# Patient Record
Sex: FEMALE | Race: WHITE | Employment: UNEMPLOYED | URBAN - NONMETROPOLITAN AREA
[De-identification: names, ages, dates, MRNs, and addresses within clinical notes are randomized per-mention and may not be internally consistent; named-entity substitution may affect disease eponyms.]

---
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
## 2024-11-20 ENCOUNTER — HOSPITAL ENCOUNTER (INPATIENT)
Age: 59
LOS: 16 days | Discharge: NURSING, REHAB OR LONG-TERM CARE | End: 2024-12-06
Attending: STUDENT IN AN ORGANIZED HEALTH CARE EDUCATION/TRAINING PROGRAM | Admitting: INTERNAL MEDICINE
Payer: COMMERCIAL

## 2024-11-20 ENCOUNTER — APPOINTMENT (OUTPATIENT)
Dept: GENERAL RADIOLOGY | Age: 59
End: 2024-11-20
Attending: STUDENT IN AN ORGANIZED HEALTH CARE EDUCATION/TRAINING PROGRAM
Payer: COMMERCIAL

## 2024-11-20 ENCOUNTER — APPOINTMENT (OUTPATIENT)
Dept: CT IMAGING | Age: 59
End: 2024-11-20
Attending: STUDENT IN AN ORGANIZED HEALTH CARE EDUCATION/TRAINING PROGRAM
Payer: COMMERCIAL

## 2024-11-20 DIAGNOSIS — J96.22 ACUTE ON CHRONIC RESPIRATORY FAILURE WITH HYPOXIA AND HYPERCAPNIA: ICD-10-CM

## 2024-11-20 DIAGNOSIS — R79.89 ELEVATED TROPONIN LEVEL: Primary | ICD-10-CM

## 2024-11-20 DIAGNOSIS — J96.21 ACUTE ON CHRONIC RESPIRATORY FAILURE WITH HYPOXIA AND HYPERCAPNIA: ICD-10-CM

## 2024-11-20 DIAGNOSIS — I48.91 NEW ONSET ATRIAL FIBRILLATION (HCC): ICD-10-CM

## 2024-11-20 DIAGNOSIS — I50.9 CONGESTIVE HEART FAILURE, UNSPECIFIED HF CHRONICITY, UNSPECIFIED HEART FAILURE TYPE (HCC): ICD-10-CM

## 2024-11-20 PROBLEM — N12 PYELONEPHRITIS: Status: ACTIVE | Noted: 2024-11-20

## 2024-11-20 PROBLEM — A41.9 SEPSIS (HCC): Status: ACTIVE | Noted: 2024-11-20

## 2024-11-20 LAB
ALBUMIN SERPL BCG-MCNC: 2.7 G/DL (ref 3.5–5.1)
ALBUMIN SERPL BCG-MCNC: 2.8 G/DL (ref 3.5–5.1)
ALP SERPL-CCNC: 150 U/L (ref 38–126)
ALP SERPL-CCNC: 99 U/L (ref 38–126)
ALT SERPL W/O P-5'-P-CCNC: 21 U/L (ref 11–66)
ALT SERPL W/O P-5'-P-CCNC: 23 U/L (ref 11–66)
ANION GAP SERPL CALC-SCNC: 13 MEQ/L (ref 8–16)
ANION GAP SERPL CALC-SCNC: 15 MEQ/L (ref 8–16)
ARTERIAL PATENCY WRIST A: POSITIVE
AST SERPL-CCNC: 28 U/L (ref 5–40)
AST SERPL-CCNC: 29 U/L (ref 5–40)
BASE EXCESS BLDA CALC-SCNC: -8 MMOL/L (ref -2.5–2.5)
BASOPHILS ABSOLUTE: 0 THOU/MM3 (ref 0–0.1)
BASOPHILS NFR BLD AUTO: 0.2 %
BDY SITE: ABNORMAL
BILIRUB CONJ SERPL-MCNC: 0.3 MG/DL (ref 0.1–13.8)
BILIRUB SERPL-MCNC: 0.4 MG/DL (ref 0.3–1.2)
BILIRUB SERPL-MCNC: 0.5 MG/DL (ref 0.3–1.2)
BUN SERPL-MCNC: 38 MG/DL (ref 7–22)
BUN SERPL-MCNC: 38 MG/DL (ref 7–22)
CA-I BLD ISE-SCNC: 1.07 MMOL/L (ref 1.12–1.32)
CALCIUM SERPL-MCNC: 7.2 MG/DL (ref 8.5–10.5)
CALCIUM SERPL-MCNC: 7.6 MG/DL (ref 8.5–10.5)
CHLORIDE 24H UR-SRATE: 32 MEQ/L
CHLORIDE SERPL-SCNC: 102 MEQ/L (ref 98–111)
CHLORIDE SERPL-SCNC: 99 MEQ/L (ref 98–111)
CO2 SERPL-SCNC: 19 MEQ/L (ref 23–33)
CO2 SERPL-SCNC: 21 MEQ/L (ref 23–33)
COLLECTED BY:: ABNORMAL
CREAT SERPL-MCNC: 2.6 MG/DL (ref 0.4–1.2)
CREAT SERPL-MCNC: 2.9 MG/DL (ref 0.4–1.2)
CREAT UR-MCNC: 135.2 MG/DL
DEPRECATED RDW RBC AUTO: 43.7 FL (ref 35–45)
DEPRECATED RDW RBC AUTO: 44.9 FL (ref 35–45)
DEVICE: ABNORMAL
EOSINOPHIL NFR BLD AUTO: 0.1 %
EOSINOPHILS ABSOLUTE: 0 THOU/MM3 (ref 0–0.4)
ERYTHROCYTE [DISTWIDTH] IN BLOOD BY AUTOMATED COUNT: 15 % (ref 11.5–14.5)
ERYTHROCYTE [DISTWIDTH] IN BLOOD BY AUTOMATED COUNT: 15.1 % (ref 11.5–14.5)
FIO2 ON VENT O2 ANALYZER: 100 %
GFR SERPL CREATININE-BSD FRML MDRD: 18 ML/MIN/1.73M2
GFR SERPL CREATININE-BSD FRML MDRD: 20 ML/MIN/1.73M2
GLUCOSE BLD STRIP.AUTO-MCNC: 188 MG/DL (ref 70–108)
GLUCOSE BLD STRIP.AUTO-MCNC: 228 MG/DL (ref 70–108)
GLUCOSE SERPL-MCNC: 124 MG/DL (ref 70–108)
GLUCOSE SERPL-MCNC: 199 MG/DL (ref 70–108)
HCO3 BLDA-SCNC: 22 MMOL/L (ref 23–28)
HCT VFR BLD AUTO: 32.8 % (ref 37–47)
HCT VFR BLD AUTO: 32.9 % (ref 37–47)
HGB BLD-MCNC: 10 GM/DL (ref 12–16)
HGB BLD-MCNC: 10.3 GM/DL (ref 12–16)
IMM GRANULOCYTES # BLD AUTO: 1.37 THOU/MM3 (ref 0–0.07)
IMM GRANULOCYTES NFR BLD AUTO: 8.3 %
LACTIC ACID, SEPSIS: 2.3 MMOL/L (ref 0.5–1.9)
LACTIC ACID, SEPSIS: 6.1 MMOL/L (ref 0.5–1.9)
LYMPHOCYTES ABSOLUTE: 0.4 THOU/MM3 (ref 1–4.8)
LYMPHOCYTES NFR BLD AUTO: 2.2 %
MAGNESIUM SERPL-MCNC: 1.1 MG/DL (ref 1.6–2.4)
MCH RBC QN AUTO: 24.9 PG (ref 26–33)
MCH RBC QN AUTO: 25 PG (ref 26–33)
MCHC RBC AUTO-ENTMCNC: 30.4 GM/DL (ref 32.2–35.5)
MCHC RBC AUTO-ENTMCNC: 31.4 GM/DL (ref 32.2–35.5)
MCV RBC AUTO: 79.6 FL (ref 81–99)
MCV RBC AUTO: 82 FL (ref 81–99)
MONOCYTES ABSOLUTE: 0.6 THOU/MM3 (ref 0.4–1.3)
MONOCYTES NFR BLD AUTO: 3.7 %
NEUTROPHILS ABSOLUTE: 14 THOU/MM3 (ref 1.8–7.7)
NEUTROPHILS NFR BLD AUTO: 85.5 %
NRBC BLD AUTO-RTO: 0 /100 WBC
PCO2 BLDA: 67 MMHG (ref 35–45)
PH BLDA: 7.13 [PH] (ref 7.35–7.45)
PLATELET # BLD AUTO: 184 THOU/MM3 (ref 130–400)
PLATELET # BLD AUTO: 194 THOU/MM3 (ref 130–400)
PMV BLD AUTO: 10.3 FL (ref 9.4–12.4)
PMV BLD AUTO: 10.7 FL (ref 9.4–12.4)
PO2 BLDA: 164 MMHG (ref 71–104)
POTASSIUM SERPL-SCNC: 4 MEQ/L (ref 3.5–5.2)
POTASSIUM SERPL-SCNC: 4.1 MEQ/L (ref 3.5–5.2)
POTASSIUM UR-SCNC: 43.9 MEQ/L
PROCALCITONIN SERPL IA-MCNC: 15.61 NG/ML (ref 0.01–0.09)
PROT SERPL-MCNC: 5.8 G/DL (ref 6.1–8)
PROT SERPL-MCNC: 6 G/DL (ref 6.1–8)
RBC # BLD AUTO: 4.01 MILL/MM3 (ref 4.2–5.4)
RBC # BLD AUTO: 4.12 MILL/MM3 (ref 4.2–5.4)
SAO2 % BLDA: 99 %
SCAN OF BLOOD SMEAR: NORMAL
SODIUM SERPL-SCNC: 133 MEQ/L (ref 135–145)
SODIUM SERPL-SCNC: 136 MEQ/L (ref 135–145)
SODIUM UR-SCNC: 42 MEQ/L
TROPONIN, HIGH SENSITIVITY: 50 NG/L (ref 0–12)
TROPONIN, HIGH SENSITIVITY: 52 NG/L (ref 0–12)
TSH SERPL DL<=0.005 MIU/L-ACNC: 1.96 UIU/ML (ref 0.4–4.2)
VENTILATION MODE VENT: ABNORMAL
WBC # BLD AUTO: 10.2 THOU/MM3 (ref 4.8–10.8)
WBC # BLD AUTO: 16.4 THOU/MM3 (ref 4.8–10.8)

## 2024-11-20 PROCEDURE — 89190 NASAL SMEAR FOR EOSINOPHILS: CPT

## 2024-11-20 PROCEDURE — 93005 ELECTROCARDIOGRAM TRACING: CPT | Performed by: UROLOGY

## 2024-11-20 PROCEDURE — 5A09557 ASSISTANCE WITH RESPIRATORY VENTILATION, GREATER THAN 96 CONSECUTIVE HOURS, CONTINUOUS POSITIVE AIRWAY PRESSURE: ICD-10-PCS | Performed by: INTERNAL MEDICINE

## 2024-11-20 PROCEDURE — P9047 ALBUMIN (HUMAN), 25%, 50ML: HCPCS

## 2024-11-20 PROCEDURE — 87581 M.PNEUMON DNA AMP PROBE: CPT

## 2024-11-20 PROCEDURE — 87541 LEGION PNEUMO DNA AMP PROB: CPT

## 2024-11-20 PROCEDURE — 87631 RESP VIRUS 3-5 TARGETS: CPT

## 2024-11-20 PROCEDURE — 82330 ASSAY OF CALCIUM: CPT

## 2024-11-20 PROCEDURE — 6360000002 HC RX W HCPCS

## 2024-11-20 PROCEDURE — 6360000002 HC RX W HCPCS: Performed by: PHYSICIAN ASSISTANT

## 2024-11-20 PROCEDURE — 87205 SMEAR GRAM STAIN: CPT

## 2024-11-20 PROCEDURE — 84133 ASSAY OF URINE POTASSIUM: CPT

## 2024-11-20 PROCEDURE — 2580000003 HC RX 258

## 2024-11-20 PROCEDURE — 93005 ELECTROCARDIOGRAM TRACING: CPT | Performed by: PHYSICIAN ASSISTANT

## 2024-11-20 PROCEDURE — 83735 ASSAY OF MAGNESIUM: CPT

## 2024-11-20 PROCEDURE — 87486 CHLMYD PNEUM DNA AMP PROBE: CPT

## 2024-11-20 PROCEDURE — 82248 BILIRUBIN DIRECT: CPT

## 2024-11-20 PROCEDURE — 82948 REAGENT STRIP/BLOOD GLUCOSE: CPT

## 2024-11-20 PROCEDURE — 99223 1ST HOSP IP/OBS HIGH 75: CPT | Performed by: PHYSICIAN ASSISTANT

## 2024-11-20 PROCEDURE — 2000000000 HC ICU R&B

## 2024-11-20 PROCEDURE — 94002 VENT MGMT INPAT INIT DAY: CPT

## 2024-11-20 PROCEDURE — 87077 CULTURE AEROBIC IDENTIFY: CPT

## 2024-11-20 PROCEDURE — 99291 CRITICAL CARE FIRST HOUR: CPT

## 2024-11-20 PROCEDURE — 84443 ASSAY THYROID STIM HORMONE: CPT

## 2024-11-20 PROCEDURE — 81001 URINALYSIS AUTO W/SCOPE: CPT

## 2024-11-20 PROCEDURE — 82803 BLOOD GASES ANY COMBINATION: CPT

## 2024-11-20 PROCEDURE — 82570 ASSAY OF URINE CREATININE: CPT

## 2024-11-20 PROCEDURE — 87798 DETECT AGENT NOS DNA AMP: CPT

## 2024-11-20 PROCEDURE — 87040 BLOOD CULTURE FOR BACTERIA: CPT

## 2024-11-20 PROCEDURE — 85025 COMPLETE CBC W/AUTO DIFF WBC: CPT

## 2024-11-20 PROCEDURE — 87186 SC STD MICRODIL/AGAR DIL: CPT

## 2024-11-20 PROCEDURE — 71045 X-RAY EXAM CHEST 1 VIEW: CPT

## 2024-11-20 PROCEDURE — 87070 CULTURE OTHR SPECIMN AEROBIC: CPT

## 2024-11-20 PROCEDURE — 31500 INSERT EMERGENCY AIRWAY: CPT

## 2024-11-20 PROCEDURE — 5A1955Z RESPIRATORY VENTILATION, GREATER THAN 96 CONSECUTIVE HOURS: ICD-10-PCS | Performed by: INTERNAL MEDICINE

## 2024-11-20 PROCEDURE — 36415 COLL VENOUS BLD VENIPUNCTURE: CPT

## 2024-11-20 PROCEDURE — 89220 SPUTUM SPECIMEN COLLECTION: CPT

## 2024-11-20 PROCEDURE — 93005 ELECTROCARDIOGRAM TRACING: CPT

## 2024-11-20 PROCEDURE — 83605 ASSAY OF LACTIC ACID: CPT

## 2024-11-20 PROCEDURE — 85027 COMPLETE CBC AUTOMATED: CPT

## 2024-11-20 PROCEDURE — 51702 INSERT TEMP BLADDER CATH: CPT

## 2024-11-20 PROCEDURE — 84300 ASSAY OF URINE SODIUM: CPT

## 2024-11-20 PROCEDURE — 84484 ASSAY OF TROPONIN QUANT: CPT

## 2024-11-20 PROCEDURE — 84145 PROCALCITONIN (PCT): CPT

## 2024-11-20 PROCEDURE — 80053 COMPREHEN METABOLIC PANEL: CPT

## 2024-11-20 PROCEDURE — 6370000000 HC RX 637 (ALT 250 FOR IP): Performed by: PHYSICIAN ASSISTANT

## 2024-11-20 PROCEDURE — 36600 WITHDRAWAL OF ARTERIAL BLOOD: CPT

## 2024-11-20 PROCEDURE — 82436 ASSAY OF URINE CHLORIDE: CPT

## 2024-11-20 PROCEDURE — 2580000003 HC RX 258: Performed by: PHYSICIAN ASSISTANT

## 2024-11-20 PROCEDURE — 05HY33Z INSERTION OF INFUSION DEVICE INTO UPPER VEIN, PERCUTANEOUS APPROACH: ICD-10-PCS | Performed by: INTERNAL MEDICINE

## 2024-11-20 PROCEDURE — 87086 URINE CULTURE/COLONY COUNT: CPT

## 2024-11-20 RX ORDER — PROPOFOL 10 MG/ML
INJECTION, EMULSION INTRAVENOUS
Status: COMPLETED
Start: 2024-11-20 | End: 2024-11-20

## 2024-11-20 RX ORDER — SODIUM CHLORIDE 0.9 % (FLUSH) 0.9 %
5-40 SYRINGE (ML) INJECTION EVERY 12 HOURS SCHEDULED
Status: DISCONTINUED | OUTPATIENT
Start: 2024-11-20 | End: 2024-12-06 | Stop reason: HOSPADM

## 2024-11-20 RX ORDER — ONDANSETRON 2 MG/ML
4 INJECTION INTRAMUSCULAR; INTRAVENOUS EVERY 6 HOURS PRN
Status: DISCONTINUED | OUTPATIENT
Start: 2024-11-20 | End: 2024-12-06 | Stop reason: HOSPADM

## 2024-11-20 RX ORDER — CALCIUM GLUCONATE 20 MG/ML
2000 INJECTION, SOLUTION INTRAVENOUS PRN
Status: DISCONTINUED | OUTPATIENT
Start: 2024-11-20 | End: 2024-11-20

## 2024-11-20 RX ORDER — ALBUTEROL SULFATE 0.83 MG/ML
2.5 SOLUTION RESPIRATORY (INHALATION) EVERY 6 HOURS PRN
Status: DISCONTINUED | OUTPATIENT
Start: 2024-11-20 | End: 2024-11-20

## 2024-11-20 RX ORDER — IPRATROPIUM BROMIDE AND ALBUTEROL SULFATE 2.5; .5 MG/3ML; MG/3ML
1 SOLUTION RESPIRATORY (INHALATION) ONCE
Status: COMPLETED | OUTPATIENT
Start: 2024-11-20 | End: 2024-11-21

## 2024-11-20 RX ORDER — MAGNESIUM SULFATE IN WATER 40 MG/ML
4000 INJECTION, SOLUTION INTRAVENOUS ONCE
Status: COMPLETED | OUTPATIENT
Start: 2024-11-20 | End: 2024-11-21

## 2024-11-20 RX ORDER — NOREPINEPHRINE BITARTRATE 0.06 MG/ML
1-100 INJECTION, SOLUTION INTRAVENOUS CONTINUOUS
Status: DISCONTINUED | OUTPATIENT
Start: 2024-11-20 | End: 2024-11-21

## 2024-11-20 RX ORDER — ACETAMINOPHEN 650 MG/1
650 SUPPOSITORY RECTAL EVERY 6 HOURS PRN
Status: DISCONTINUED | OUTPATIENT
Start: 2024-11-20 | End: 2024-12-06 | Stop reason: SDUPTHER

## 2024-11-20 RX ORDER — CALCIUM GLUCONATE 20 MG/ML
2000 INJECTION, SOLUTION INTRAVENOUS ONCE
Status: COMPLETED | OUTPATIENT
Start: 2024-11-20 | End: 2024-11-21

## 2024-11-20 RX ORDER — POTASSIUM CHLORIDE 7.45 MG/ML
10 INJECTION INTRAVENOUS PRN
Status: DISCONTINUED | OUTPATIENT
Start: 2024-11-20 | End: 2024-11-20

## 2024-11-20 RX ORDER — ALBUMIN (HUMAN) 12.5 G/50ML
25 SOLUTION INTRAVENOUS ONCE
Status: COMPLETED | OUTPATIENT
Start: 2024-11-20 | End: 2024-11-20

## 2024-11-20 RX ORDER — ATORVASTATIN CALCIUM 20 MG/1
20 TABLET, FILM COATED ORAL NIGHTLY
Status: DISCONTINUED | OUTPATIENT
Start: 2024-11-20 | End: 2024-11-27

## 2024-11-20 RX ORDER — POLYETHYLENE GLYCOL 3350 17 G/17G
17 POWDER, FOR SOLUTION ORAL DAILY PRN
Status: DISCONTINUED | OUTPATIENT
Start: 2024-11-20 | End: 2024-12-06 | Stop reason: HOSPADM

## 2024-11-20 RX ORDER — SODIUM CHLORIDE 9 MG/ML
INJECTION, SOLUTION INTRAVENOUS PRN
Status: DISCONTINUED | OUTPATIENT
Start: 2024-11-20 | End: 2024-12-06 | Stop reason: HOSPADM

## 2024-11-20 RX ORDER — POTASSIUM CHLORIDE 1500 MG/1
40 TABLET, EXTENDED RELEASE ORAL PRN
Status: DISCONTINUED | OUTPATIENT
Start: 2024-11-20 | End: 2024-11-20

## 2024-11-20 RX ORDER — ONDANSETRON 4 MG/1
4 TABLET, ORALLY DISINTEGRATING ORAL EVERY 8 HOURS PRN
Status: DISCONTINUED | OUTPATIENT
Start: 2024-11-20 | End: 2024-12-06 | Stop reason: HOSPADM

## 2024-11-20 RX ORDER — PROPOFOL 10 MG/ML
5-50 INJECTION, EMULSION INTRAVENOUS CONTINUOUS
Status: DISCONTINUED | OUTPATIENT
Start: 2024-11-20 | End: 2024-11-21

## 2024-11-20 RX ORDER — GLUCAGON 1 MG/ML
1 KIT INJECTION PRN
Status: DISCONTINUED | OUTPATIENT
Start: 2024-11-20 | End: 2024-12-06 | Stop reason: HOSPADM

## 2024-11-20 RX ORDER — FENTANYL CITRATE 50 UG/ML
50 INJECTION, SOLUTION INTRAMUSCULAR; INTRAVENOUS
Status: DISCONTINUED | OUTPATIENT
Start: 2024-11-20 | End: 2024-11-21

## 2024-11-20 RX ORDER — SODIUM CHLORIDE 9 MG/ML
INJECTION, SOLUTION INTRAVENOUS CONTINUOUS
Status: DISCONTINUED | OUTPATIENT
Start: 2024-11-20 | End: 2024-11-22

## 2024-11-20 RX ORDER — METOPROLOL SUCCINATE 50 MG/1
50 TABLET, EXTENDED RELEASE ORAL DAILY
Status: DISCONTINUED | OUTPATIENT
Start: 2024-11-20 | End: 2024-11-24

## 2024-11-20 RX ORDER — INSULIN LISPRO 100 [IU]/ML
0-4 INJECTION, SOLUTION INTRAVENOUS; SUBCUTANEOUS
Status: DISCONTINUED | OUTPATIENT
Start: 2024-11-20 | End: 2024-11-23

## 2024-11-20 RX ORDER — SODIUM CHLORIDE 0.9 % (FLUSH) 0.9 %
5-40 SYRINGE (ML) INJECTION PRN
Status: DISCONTINUED | OUTPATIENT
Start: 2024-11-20 | End: 2024-12-06 | Stop reason: HOSPADM

## 2024-11-20 RX ORDER — LISINOPRIL 40 MG/1
40 TABLET ORAL DAILY
Status: DISCONTINUED | OUTPATIENT
Start: 2024-11-20 | End: 2024-12-06 | Stop reason: HOSPADM

## 2024-11-20 RX ORDER — MAGNESIUM SULFATE IN WATER 40 MG/ML
2000 INJECTION, SOLUTION INTRAVENOUS PRN
Status: DISCONTINUED | OUTPATIENT
Start: 2024-11-20 | End: 2024-11-20

## 2024-11-20 RX ORDER — ALBUTEROL SULFATE 0.83 MG/ML
2.5 SOLUTION RESPIRATORY (INHALATION) EVERY 6 HOURS PRN
Status: DISCONTINUED | OUTPATIENT
Start: 2024-11-20 | End: 2024-11-28

## 2024-11-20 RX ORDER — DEXTROSE MONOHYDRATE 100 MG/ML
INJECTION, SOLUTION INTRAVENOUS CONTINUOUS PRN
Status: DISCONTINUED | OUTPATIENT
Start: 2024-11-20 | End: 2024-12-06 | Stop reason: HOSPADM

## 2024-11-20 RX ORDER — ACETAMINOPHEN 325 MG/1
650 TABLET ORAL EVERY 6 HOURS PRN
Status: DISCONTINUED | OUTPATIENT
Start: 2024-11-20 | End: 2024-12-06 | Stop reason: SDUPTHER

## 2024-11-20 RX ORDER — POTASSIUM CHLORIDE 29.8 MG/ML
20 INJECTION INTRAVENOUS PRN
Status: DISCONTINUED | OUTPATIENT
Start: 2024-11-20 | End: 2024-11-20

## 2024-11-20 RX ADMIN — SODIUM CHLORIDE, PRESERVATIVE FREE 10 ML: 5 INJECTION INTRAVENOUS at 22:50

## 2024-11-20 RX ADMIN — ATORVASTATIN CALCIUM 20 MG: 20 TABLET, FILM COATED ORAL at 23:11

## 2024-11-20 RX ADMIN — SODIUM CHLORIDE: 9 INJECTION, SOLUTION INTRAVENOUS at 22:17

## 2024-11-20 RX ADMIN — WATER 2000 MG: 1 INJECTION INTRAMUSCULAR; INTRAVENOUS; SUBCUTANEOUS at 23:06

## 2024-11-20 RX ADMIN — INSULIN LISPRO 1 UNITS: 100 INJECTION, SOLUTION INTRAVENOUS; SUBCUTANEOUS at 22:46

## 2024-11-20 RX ADMIN — FENTANYL CITRATE 50 MCG: 50 INJECTION, SOLUTION INTRAMUSCULAR; INTRAVENOUS at 22:31

## 2024-11-20 RX ADMIN — PROPOFOL 20 MCG/KG/MIN: 10 INJECTION, EMULSION INTRAVENOUS at 21:55

## 2024-11-20 RX ADMIN — ALBUMIN (HUMAN) 25 G: 0.25 INJECTION, SOLUTION INTRAVENOUS at 22:27

## 2024-11-20 RX ADMIN — CALCIUM GLUCONATE 2000 MG: 20 INJECTION, SOLUTION INTRAVENOUS at 22:36

## 2024-11-20 RX ADMIN — MAGNESIUM SULFATE HEPTAHYDRATE 4000 MG: 40 INJECTION, SOLUTION INTRAVENOUS at 22:38

## 2024-11-20 ASSESSMENT — PAIN SCALES - GENERAL: PAINLEVEL_OUTOF10: 0

## 2024-11-20 ASSESSMENT — PULMONARY FUNCTION TESTS: PIF_VALUE: 26

## 2024-11-20 NOTE — H&P
Hospitalist History & Physical    Patient:  Priya Trejo    Unit/Bed:4K-24/024-A  YOB: 1965  MRN: [id number]   PCP: Parmjit Tesfaye MD  Code Status: Full Code    Date of Service: The patient was seen and examined on 11/20/24 and admitted to Inpatient with an expected length of stay of greater than two midnights due to medical therapy.     Chief Complaint: Sepsis secondary to pyelonephritis    Assessment/Plan:    Severe sepsis secondary to pyelonephritis, present on arrival  Identified at outside facility  SIRS Criteria Heart Rate >90 and WBC >12k or <4k or >10% Bands  Organ Dysfunction: Creatinine >2.0 and Lactate >2 mmol/L  Cultures Blood x2 and Urine  Antibiotics Ceftriaxone  If patient remains hemodynamically stable postprocedure, plan to remove central line at that time.  Left nephrolithiasis, left pyelonephritis  Noted on CT scan at the outside facility.  Urology consulted.  Planning for surgical intervention tomorrow.  Will maintain Salazar catheter at this time per urology recommendation.  Acute kidney injury  Suspect likely secondary to hypotension.  May be component of ATN.  Creatinine 2.6, was previously 0.74 on 11/5/2023.  IV fluid resuscitation.  Check UA with microscopy, urine electrolytes.  Daily BMP.  Consider nephrology consultation if creatinine fails to improve.  Hold nephrotoxic agents.  Elevated troponin  Initial troponin 50, repeat pending.  No chest pain or anginal equivalent.  EKG shows sinus tachycardia with ST depressions in lead I.  Suspect demand ischemia in the setting of sepsis.  Troponin also likely elevated secondary to acute kidney injury.  Check TTE  Hypertension  Hold lisinopril due to RAINE.  Continue metoprolol.  Microcytic anemia  Hemoglobin 10.3.  Consider iron studies pending clinical course.  Daily CBC.  Lactic acidosis  Continue IV fluid resuscitation.  Trend lactate.  Hyponatremia  Sodium 133.  On IV fluids.  Repeat BMP

## 2024-11-20 NOTE — PROGRESS NOTES
Report from Facility: [facility] ER  Referring Physician: ADITYA Villarreal  Accepting Physician: Dr. Behl  Patient Condition:  60yo ( [birth year]) at [facility] INP unit. Admitted yesterday with c/o Left lower back pain, shortness of breath. WBC initially 30. down to 20.1. GFR 19. CT abd showed Left pyelonephritis and left renal pelvis stone. Dr. rolon notified and would like to take to surgery sooner than later. PMH. COPD on home O2. Last vitals: Afebrile 114-20-96%3L-1110/75

## 2024-11-20 NOTE — PROCEDURES
PROCEDURE NOTE  Date: 11/20/2024   Name: Priya Lema  YOB: 1965    Procedures  EKG completed

## 2024-11-20 NOTE — PLAN OF CARE
to ensure adequate hydration   Administer IV fluids as ordered to ensure adequate hydration   Administer ordered medications as needed   Encourage mobilization and activity   Nutrition consult to assist patient with appropriate food choices  Goal: Maintains adequate nutritional intake  Outcome: Progressing  Flowsheets (Taken 11/20/2024 1700)  Maintains adequate nutritional intake:   Monitor percentage of each meal consumed   Identify factors contributing to decreased intake, treat as appropriate   Assist with meals as needed   Monitor intake and output, weight and lab values   Obtain nutritional consult as needed     Problem: Genitourinary - Adult  Goal: Absence of urinary retention  Outcome: Progressing  Flowsheets (Taken 11/20/2024 1700)  Absence of urinary retention:   Assess patient’s ability to void and empty bladder   Monitor intake/output and perform bladder scan as needed  Goal: Urinary catheter remains patent  Outcome: Progressing  Flowsheets (Taken 11/20/2024 1700)  Urinary catheter remains patent: Assess patency of urinary catheter     Problem: Infection - Adult  Goal: Absence of infection at discharge  Outcome: Progressing  Flowsheets (Taken 11/20/2024 1700)  Absence of infection at discharge:   Assess and monitor for signs and symptoms of infection   Monitor lab/diagnostic results   Monitor all insertion sites i.e., indwelling lines, tubes and drains   [place] appropriate cooling/warming therapies per order   Administer medications as ordered   Instruct and encourage patient and family to use good hand hygiene technique  Goal: Absence of infection during hospitalization  Outcome: Progressing  Flowsheets (Taken 11/20/2024 1700)  Absence of infection during hospitalization:   Assess and monitor for signs and symptoms of infection   Monitor lab/diagnostic results   Monitor all insertion sites i.e., indwelling lines, tubes and drains   [place] appropriate cooling/warming therapies per order   Administer  Assess for signs and symptoms of bleeding or hemorrhage   Monitor labs for bleeding or clotting disorders     Care plan reviewed with patient and family.  Patient and family verbalize understanding of the plan of care and contribute to goal setting.

## 2024-11-20 NOTE — PROGRESS NOTES
Pt admitted to  4K24 from [facility] ER via EMS.   Complaints: Pyelonephritis/Kidney Stone.    IV normal saline infusing into the internal jugular right, condition patent and no redness at a rate of 75 mls/ hour with about 500 mls in the bag still. IV site free of s/s of infection or infiltration. Vital signs obtained. Assessment and data collection initiated. Two nurse skin assessment performed by Otilia Pereyra RN and Kenisha Manning RN. Oriented to room. Policies and procedures for 4K explained. All questions answered with no further questions at this time. Fall prevention and safety brochure discussed with patient.  Bed alarm on. Call light in reach.

## 2024-11-21 ENCOUNTER — APPOINTMENT (OUTPATIENT)
Dept: ULTRASOUND IMAGING | Age: 59
End: 2024-11-21
Attending: STUDENT IN AN ORGANIZED HEALTH CARE EDUCATION/TRAINING PROGRAM
Payer: COMMERCIAL

## 2024-11-21 ENCOUNTER — APPOINTMENT (OUTPATIENT)
Age: 59
End: 2024-11-21
Attending: PHYSICIAN ASSISTANT
Payer: COMMERCIAL

## 2024-11-21 ENCOUNTER — ANESTHESIA (OUTPATIENT)
Dept: OPERATING ROOM | Age: 59
End: 2024-11-21
Payer: MEDICAID

## 2024-11-21 ENCOUNTER — ANESTHESIA EVENT (OUTPATIENT)
Dept: OPERATING ROOM | Age: 59
End: 2024-11-21
Payer: MEDICAID

## 2024-11-21 PROBLEM — N17.9 AKI (ACUTE KIDNEY INJURY) (HCC): Status: ACTIVE | Noted: 2024-11-21

## 2024-11-21 PROBLEM — E87.20 METABOLIC ACIDOSIS: Status: ACTIVE | Noted: 2024-11-21

## 2024-11-21 PROBLEM — J96.21 ACUTE ON CHRONIC RESPIRATORY FAILURE WITH HYPOXIA AND HYPERCAPNIA: Status: ACTIVE | Noted: 2024-11-21

## 2024-11-21 PROBLEM — E11.65 TYPE 2 DIABETES MELLITUS WITH HYPERGLYCEMIA (HCC): Status: ACTIVE | Noted: 2024-11-21

## 2024-11-21 PROBLEM — J96.22 ACUTE ON CHRONIC RESPIRATORY FAILURE WITH HYPOXIA AND HYPERCAPNIA: Status: ACTIVE | Noted: 2024-11-21

## 2024-11-21 PROBLEM — R79.89 ELEVATED TROPONIN LEVEL: Status: ACTIVE | Noted: 2024-11-21

## 2024-11-21 PROBLEM — J44.1 COPD EXACERBATION (HCC): Status: ACTIVE | Noted: 2024-11-21

## 2024-11-21 PROBLEM — A41.9 SEPTIC SHOCK (HCC): Status: ACTIVE | Noted: 2024-11-21

## 2024-11-21 PROBLEM — R65.21 SEPTIC SHOCK (HCC): Status: ACTIVE | Noted: 2024-11-21

## 2024-11-21 LAB
ACINETOBACTER CALCOACETICUS-BAUMANNII BY PCR: NOT DETECTED
ALBUMIN SERPL BCG-MCNC: 2.8 G/DL (ref 3.5–5.1)
ALP SERPL-CCNC: 84 U/L (ref 38–126)
ALT SERPL W/O P-5'-P-CCNC: 21 U/L (ref 11–66)
ANION GAP SERPL CALC-SCNC: 12 MEQ/L (ref 8–16)
AST SERPL-CCNC: 28 U/L (ref 5–40)
BACTERIA: ABNORMAL
BASE EXCESS BLDA CALC-SCNC: -2.5 MMOL/L (ref -2.5–2.5)
BASOPHILS ABSOLUTE: 0 THOU/MM3 (ref 0–0.1)
BASOPHILS NFR BLD AUTO: 0.1 %
BDY SITE: ABNORMAL
BILIRUB SERPL-MCNC: 0.3 MG/DL (ref 0.3–1.2)
BILIRUB UR QL STRIP: NEGATIVE
BLACTX-M ISLT/SPM QL: NORMAL
BLAIMP ISLT/SPM QL: NORMAL
BLAKPC ISLT/SPM QL: NORMAL
BLAOXA-48-LIKE ISLT/SPM QL: NORMAL
BLAVIM ISLT/SPM QL: NORMAL
BUN SERPL-MCNC: 38 MG/DL (ref 7–22)
C PNEUM DNA LOWER RESP QL NAA+NON-PROBE: NOT DETECTED
CA-I BLD ISE-SCNC: 1.12 MMOL/L (ref 1.12–1.32)
CALCIUM SERPL-MCNC: 7.9 MG/DL (ref 8.5–10.5)
CASTS #/AREA URNS LPF: ABNORMAL /LPF
CASTS #/AREA URNS LPF: ABNORMAL /LPF
CHARACTER UR: ABNORMAL
CHARCOAL URNS QL MICRO: ABNORMAL
CHLORIDE SERPL-SCNC: 103 MEQ/L (ref 98–111)
CO2 SERPL-SCNC: 22 MEQ/L (ref 23–33)
COLLECTED BY:: ABNORMAL
COLOR UR: YELLOW
CREAT SERPL-MCNC: 2.6 MG/DL (ref 0.4–1.2)
CRYSTALS URNS QL MICRO: ABNORMAL
DEPRECATED RDW RBC AUTO: 45 FL (ref 35–45)
DEVICE: ABNORMAL
DOHLE BOD BLD QL SMEAR: PRESENT
ECHO AO ASC DIAM: 3 CM
ECHO AO ASCENDING AORTA INDEX: 1.6 CM/M2
ECHO AO SINUS VALSALVA DIAM: 2.4 CM
ECHO AO SINUS VALSALVA INDEX: 1.28 CM/M2
ECHO AV CUSP MM: 1.7 CM
ECHO AV MEAN GRADIENT: 4 MMHG
ECHO AV MEAN VELOCITY: 0.9 M/S
ECHO AV PEAK GRADIENT: 7 MMHG
ECHO AV PEAK VELOCITY: 1.4 M/S
ECHO AV VELOCITY RATIO: 0.64
ECHO AV VTI: 32.4 CM
ECHO BSA: 1.99 M2
ECHO EST RA PRESSURE: 8 MMHG
ECHO IVC PROX: 2 CM
ECHO LA AREA 2C: 11.2 CM2
ECHO LA AREA 4C: 12.3 CM2
ECHO LA DIAMETER INDEX: 1.49 CM/M2
ECHO LA DIAMETER: 2.8 CM
ECHO LA MAJOR AXIS: 5.2 CM
ECHO LA MINOR AXIS: 4.4 CM
ECHO LA VOL BP: 26 ML (ref 22–52)
ECHO LA VOL MOD A2C: 24 ML (ref 22–52)
ECHO LA VOL MOD A4C: 24 ML (ref 22–52)
ECHO LA VOL/BSA BIPLANE: 14 ML/M2 (ref 16–34)
ECHO LA VOLUME INDEX MOD A2C: 13 ML/M2 (ref 16–34)
ECHO LA VOLUME INDEX MOD A4C: 13 ML/M2 (ref 16–34)
ECHO LV EDV A2C: 125 ML
ECHO LV EDV A4C: 92 ML
ECHO LV EDV INDEX A4C: 49 ML/M2
ECHO LV EDV NDEX A2C: 66 ML/M2
ECHO LV EJECTION FRACTION A2C: 34 %
ECHO LV EJECTION FRACTION A4C: 25 %
ECHO LV EJECTION FRACTION BIPLANE: 29 % (ref 55–100)
ECHO LV ESV A2C: 82 ML
ECHO LV ESV A4C: 69 ML
ECHO LV ESV INDEX A2C: 44 ML/M2
ECHO LV ESV INDEX A4C: 37 ML/M2
ECHO LV FRACTIONAL SHORTENING: 17 % (ref 28–44)
ECHO LV INTERNAL DIMENSION DIASTOLE INDEX: 2.55 CM/M2
ECHO LV INTERNAL DIMENSION DIASTOLIC: 4.8 CM (ref 3.9–5.3)
ECHO LV INTERNAL DIMENSION SYSTOLIC INDEX: 2.13 CM/M2
ECHO LV INTERNAL DIMENSION SYSTOLIC: 4 CM
ECHO LV ISOVOLUMETRIC RELAXATION TIME (IVRT): 81 MS
ECHO LV IVSD: 1.2 CM (ref 0.6–0.9)
ECHO LV MASS 2D: 219.1 G (ref 67–162)
ECHO LV MASS INDEX 2D: 116.6 G/M2 (ref 43–95)
ECHO LV POSTERIOR WALL DIASTOLIC: 1.2 CM (ref 0.6–0.9)
ECHO LV RELATIVE WALL THICKNESS RATIO: 0.5
ECHO LVOT AV VTI INDEX: 0.56
ECHO LVOT MEAN GRADIENT: 1 MMHG
ECHO LVOT PEAK GRADIENT: 3 MMHG
ECHO LVOT PEAK VELOCITY: 0.9 M/S
ECHO LVOT VTI: 18.2 CM
ECHO MV A VELOCITY: 0.68 M/S
ECHO MV E DECELERATION TIME (DT): 192 MS
ECHO MV E VELOCITY: 1 M/S
ECHO MV E/A RATIO: 1.47
ECHO MV REGURGITANT PEAK GRADIENT: 52 MMHG
ECHO MV REGURGITANT PEAK VELOCITY: 3.6 M/S
ECHO PV MAX VELOCITY: 0.7 M/S
ECHO PV PEAK GRADIENT: 2 MMHG
ECHO RIGHT VENTRICULAR SYSTOLIC PRESSURE (RVSP): 35 MMHG
ECHO RV INTERNAL DIMENSION: 2.7 CM
ECHO RV TAPSE: 1.6 CM (ref 1.7–?)
ECHO TV E WAVE: 0.5 M/S
ECHO TV REGURGITANT MAX VELOCITY: 2.61 M/S
ECHO TV REGURGITANT PEAK GRADIENT: 27 MMHG
EKG ATRIAL RATE: 116 BPM
EKG ATRIAL RATE: 132 BPM
EKG ATRIAL RATE: 167 BPM
EKG P AXIS: 58 DEGREES
EKG P AXIS: 64 DEGREES
EKG P AXIS: 65 DEGREES
EKG P-R INTERVAL: 122 MS
EKG P-R INTERVAL: 124 MS
EKG P-R INTERVAL: 126 MS
EKG Q-T INTERVAL: 280 MS
EKG Q-T INTERVAL: 294 MS
EKG Q-T INTERVAL: 304 MS
EKG QRS DURATION: 70 MS
EKG QRS DURATION: 84 MS
EKG QRS DURATION: 84 MS
EKG QTC CALCULATION (BAZETT): 414 MS
EKG QTC CALCULATION (BAZETT): 422 MS
EKG QTC CALCULATION (BAZETT): 490 MS
EKG R AXIS: 12 DEGREES
EKG R AXIS: 30 DEGREES
EKG R AXIS: 30 DEGREES
EKG T AXIS: 114 DEGREES
EKG T AXIS: 144 DEGREES
EKG T AXIS: 57 DEGREES
EKG VENTRICULAR RATE: 116 BPM
EKG VENTRICULAR RATE: 132 BPM
EKG VENTRICULAR RATE: 167 BPM
ENTEROBACTER CLOACAE COMPLEX BY PCR: NOT DETECTED
EOSINOPHIL NFR BLD AUTO: 0.1 %
EOSINOPHIL SMEAR, URINE: NORMAL
EOSINOPHILS ABSOLUTE: 0 THOU/MM3 (ref 0–0.4)
EPITHELIAL CELLS, UA: ABNORMAL /HPF
ERYTHROCYTE [DISTWIDTH] IN BLOOD BY AUTOMATED COUNT: 15.2 % (ref 11.5–14.5)
ESCHERICHIA COLI BY PCR: NOT DETECTED
FIO2 ON VENT O2 ANALYZER: 70 %
FLUAV RNA LOWER RESP QL NAA+NON-PROBE: NOT DETECTED
FLUBV RNA LOWER RESP QL NAA+NON-PROBE: NOT DETECTED
GFR SERPL CREATININE-BSD FRML MDRD: 20 ML/MIN/1.73M2
GLUCOSE BLD STRIP.AUTO-MCNC: 162 MG/DL (ref 70–108)
GLUCOSE BLD STRIP.AUTO-MCNC: 183 MG/DL (ref 70–108)
GLUCOSE BLD STRIP.AUTO-MCNC: 196 MG/DL (ref 70–108)
GLUCOSE BLD STRIP.AUTO-MCNC: 222 MG/DL (ref 70–108)
GLUCOSE SERPL-MCNC: 160 MG/DL (ref 70–108)
GLUCOSE UR QL STRIP.AUTO: 100 MG/DL
HADV DNA LOWER RESP QL NAA+NON-PROBE: NOT DETECTED
HAEMOPHILUS INFLUENZAE BY PCR: NOT DETECTED
HCO3 BLDA-SCNC: 24 MMOL/L (ref 23–28)
HCOV RNA LOWER RESP QL NAA+NON-PROBE: NOT DETECTED
HCT VFR BLD AUTO: 27.4 % (ref 37–47)
HCT VFR BLD AUTO: 28.1 % (ref 37–47)
HGB BLD-MCNC: 8.4 GM/DL (ref 12–16)
HGB BLD-MCNC: 8.8 GM/DL (ref 12–16)
HGB UR QL STRIP.AUTO: ABNORMAL
HMPV RNA LOWER RESP QL NAA+NON-PROBE: NOT DETECTED
HPIV RNA LOWER RESP QL NAA+NON-PROBE: NOT DETECTED
IMM GRANULOCYTES # BLD AUTO: 1.07 THOU/MM3 (ref 0–0.07)
IMM GRANULOCYTES NFR BLD AUTO: 8.9 %
KETONES UR QL STRIP.AUTO: NEGATIVE
KLEBSIELLA AEROGENES BY PCR: NOT DETECTED
KLEBSIELLA OXYTOCA BY PCR: NOT DETECTED
KLEBSIELLA PNEUMONIAE GROUP BY PCR: NOT DETECTED
L PNEUMO DNA LOWER RESP QL NAA+NON-PROBE: NOT DETECTED
LACTATE SERPL-SCNC: 1.3 MMOL/L (ref 0.5–2)
LEUKOCYTE ESTERASE UR QL STRIP.AUTO: ABNORMAL
LYMPHOCYTES ABSOLUTE: 0.6 THOU/MM3 (ref 1–4.8)
LYMPHOCYTES NFR BLD AUTO: 4.8 %
M PNEUMO DNA LOWER RESP QL NAA+NON-PROBE: NOT DETECTED
MAGNESIUM SERPL-MCNC: 2.9 MG/DL (ref 1.6–2.4)
MCH RBC QN AUTO: 25.4 PG (ref 26–33)
MCHC RBC AUTO-ENTMCNC: 31.3 GM/DL (ref 32.2–35.5)
MCV RBC AUTO: 81.2 FL (ref 81–99)
MONOCYTES ABSOLUTE: 0.4 THOU/MM3 (ref 0.4–1.3)
MONOCYTES NFR BLD AUTO: 3.3 %
MORAXELLA CATARRHALIS BY PCR: NOT DETECTED
NEUTROPHILS ABSOLUTE: 9.9 THOU/MM3 (ref 1.8–7.7)
NEUTROPHILS NFR BLD AUTO: 82.8 %
NITRITE UR QL STRIP.AUTO: NEGATIVE
NRBC BLD AUTO-RTO: 0 /100 WBC
PCO2 BLDA: 49 MMHG (ref 35–45)
PEEP SETTING VENT: 6 MMHG
PH BLDA: 7.3 [PH] (ref 7.35–7.45)
PH UR STRIP.AUTO: 5.5 [PH] (ref 5–9)
PHOSPHATE SERPL-MCNC: 4.2 MG/DL (ref 2.4–4.7)
PIP: 26 CMH2O
PLATELET # BLD AUTO: 150 THOU/MM3 (ref 130–400)
PLATELET BLD QL SMEAR: ADEQUATE
PMV BLD AUTO: 11.2 FL (ref 9.4–12.4)
PO2 BLDA: 120 MMHG (ref 71–104)
POIKILOCYTES: ABNORMAL
POTASSIUM SERPL-SCNC: 4 MEQ/L (ref 3.5–5.2)
PROT SERPL-MCNC: 5.4 G/DL (ref 6.1–8)
PROT UR STRIP.AUTO-MCNC: 300 MG/DL
PROTEUS SPECIES BY PCR: NOT DETECTED
PSEUDOMONAS AERUGINOSA BY PCR: NOT DETECTED
RBC # BLD AUTO: 3.46 MILL/MM3 (ref 4.2–5.4)
RBC #/AREA URNS HPF: ABNORMAL /HPF
RENAL EPI CELLS #/AREA URNS HPF: ABNORMAL /[HPF]
RESISTANT GENE MECA/C & MREJ BY PCR: NORMAL
RESISTANT GENE NDM BY PCR: NORMAL
RSV RNA LOWER RESP QL NAA+NON-PROBE: NOT DETECTED
RV+EV RNA LOWER RESP QL NAA+NON-PROBE: NOT DETECTED
SAO2 % BLDA: 98 %
SCAN OF BLOOD SMEAR: NORMAL
SERRATIA MARCESCENS BY PCR: NOT DETECTED
SODIUM SERPL-SCNC: 137 MEQ/L (ref 135–145)
SOURCE: NORMAL
SPECIFIC GRAVITY UA: 1.02 (ref 1–1.03)
SPECIMEN ACCEPTABILITY: NORMAL
STAPH AUREUS BY PCR: NOT DETECTED
STREP AGALACTIAE BY PCR: NOT DETECTED
STREP PNEUMONIAE BY PCR: NOT DETECTED
STREP PYOGENES BY PCR: NOT DETECTED
TOXIC GRANULES BLD QL SMEAR: PRESENT
UROBILINOGEN, URINE: 0.2 EU/DL (ref 0–1)
VENTILATION MODE VENT: ABNORMAL
WBC # BLD AUTO: 12 THOU/MM3 (ref 4.8–10.8)
WBC #/AREA URNS HPF: ABNORMAL /HPF
YEAST LIKE FUNGI URNS QL MICRO: ABNORMAL

## 2024-11-21 PROCEDURE — 6360000002 HC RX W HCPCS

## 2024-11-21 PROCEDURE — 99291 CRITICAL CARE FIRST HOUR: CPT | Performed by: INTERNAL MEDICINE

## 2024-11-21 PROCEDURE — 6370000000 HC RX 637 (ALT 250 FOR IP)

## 2024-11-21 PROCEDURE — 2500000003 HC RX 250 WO HCPCS

## 2024-11-21 PROCEDURE — 6370000000 HC RX 637 (ALT 250 FOR IP): Performed by: PHYSICIAN ASSISTANT

## 2024-11-21 PROCEDURE — 2580000003 HC RX 258: Performed by: PHYSICIAN ASSISTANT

## 2024-11-21 PROCEDURE — 36415 COLL VENOUS BLD VENIPUNCTURE: CPT

## 2024-11-21 PROCEDURE — 93010 ELECTROCARDIOGRAM REPORT: CPT | Performed by: INTERNAL MEDICINE

## 2024-11-21 PROCEDURE — 83735 ASSAY OF MAGNESIUM: CPT

## 2024-11-21 PROCEDURE — 6360000002 HC RX W HCPCS: Performed by: NURSE ANESTHETIST, CERTIFIED REGISTERED

## 2024-11-21 PROCEDURE — 36600 WITHDRAWAL OF ARTERIAL BLOOD: CPT

## 2024-11-21 PROCEDURE — 51798 US URINE CAPACITY MEASURE: CPT

## 2024-11-21 PROCEDURE — 0T778DZ DILATION OF LEFT URETER WITH INTRALUMINAL DEVICE, VIA NATURAL OR ARTIFICIAL OPENING ENDOSCOPIC: ICD-10-PCS | Performed by: UROLOGY

## 2024-11-21 PROCEDURE — 83605 ASSAY OF LACTIC ACID: CPT

## 2024-11-21 PROCEDURE — 3600000012 HC SURGERY LEVEL 2 ADDTL 15MIN: Performed by: UROLOGY

## 2024-11-21 PROCEDURE — 3700000001 HC ADD 15 MINUTES (ANESTHESIA): Performed by: UROLOGY

## 2024-11-21 PROCEDURE — 82948 REAGENT STRIP/BLOOD GLUCOSE: CPT

## 2024-11-21 PROCEDURE — 84100 ASSAY OF PHOSPHORUS: CPT

## 2024-11-21 PROCEDURE — 2709999900 HC NON-CHARGEABLE SUPPLY: Performed by: UROLOGY

## 2024-11-21 PROCEDURE — 82803 BLOOD GASES ANY COMBINATION: CPT

## 2024-11-21 PROCEDURE — 2580000003 HC RX 258

## 2024-11-21 PROCEDURE — 94003 VENT MGMT INPAT SUBQ DAY: CPT

## 2024-11-21 PROCEDURE — 2000000000 HC ICU R&B

## 2024-11-21 PROCEDURE — 82330 ASSAY OF CALCIUM: CPT

## 2024-11-21 PROCEDURE — 2700000000 HC OXYGEN THERAPY PER DAY

## 2024-11-21 PROCEDURE — 92523 SPEECH SOUND LANG COMPREHEN: CPT

## 2024-11-21 PROCEDURE — 76770 US EXAM ABDO BACK WALL COMP: CPT

## 2024-11-21 PROCEDURE — C1769 GUIDE WIRE: HCPCS | Performed by: UROLOGY

## 2024-11-21 PROCEDURE — 80053 COMPREHEN METABOLIC PANEL: CPT

## 2024-11-21 PROCEDURE — 85018 HEMOGLOBIN: CPT

## 2024-11-21 PROCEDURE — 2580000003 HC RX 258: Performed by: NURSE ANESTHETIST, CERTIFIED REGISTERED

## 2024-11-21 PROCEDURE — 85014 HEMATOCRIT: CPT

## 2024-11-21 PROCEDURE — C8929 TTE W OR WO FOL WCON,DOPPLER: HCPCS

## 2024-11-21 PROCEDURE — 3600000002 HC SURGERY LEVEL 2 BASE: Performed by: UROLOGY

## 2024-11-21 PROCEDURE — 3700000000 HC ANESTHESIA ATTENDED CARE: Performed by: UROLOGY

## 2024-11-21 PROCEDURE — 94761 N-INVAS EAR/PLS OXIMETRY MLT: CPT

## 2024-11-21 PROCEDURE — 85025 COMPLETE CBC W/AUTO DIFF WBC: CPT

## 2024-11-21 PROCEDURE — 6360000004 HC RX CONTRAST MEDICATION: Performed by: PHYSICIAN ASSISTANT

## 2024-11-21 PROCEDURE — 0BH17EZ INSERTION OF ENDOTRACHEAL AIRWAY INTO TRACHEA, VIA NATURAL OR ARTIFICIAL OPENING: ICD-10-PCS

## 2024-11-21 PROCEDURE — 94640 AIRWAY INHALATION TREATMENT: CPT

## 2024-11-21 PROCEDURE — C2617 STENT, NON-COR, TEM W/O DEL: HCPCS | Performed by: UROLOGY

## 2024-11-21 PROCEDURE — 93306 TTE W/DOPPLER COMPLETE: CPT | Performed by: NUCLEAR MEDICINE

## 2024-11-21 DEVICE — URETERAL STENT WITH SIDE HOLES 6FX24CM
Type: IMPLANTABLE DEVICE | Status: FUNCTIONAL
Brand: TRIA™ SOFT

## 2024-11-21 RX ORDER — PROPOFOL 10 MG/ML
INJECTION, EMULSION INTRAVENOUS
Status: DISCONTINUED | OUTPATIENT
Start: 2024-11-21 | End: 2024-11-21 | Stop reason: SDUPTHER

## 2024-11-21 RX ORDER — INSULIN GLARGINE 100 [IU]/ML
10 INJECTION, SOLUTION SUBCUTANEOUS DAILY
Status: DISCONTINUED | OUTPATIENT
Start: 2024-11-21 | End: 2024-11-23

## 2024-11-21 RX ORDER — PREDNISONE 20 MG/1
40 TABLET ORAL DAILY
Status: DISCONTINUED | OUTPATIENT
Start: 2024-11-22 | End: 2024-11-21

## 2024-11-21 RX ORDER — SODIUM CHLORIDE 9 MG/ML
INJECTION, SOLUTION INTRAVENOUS
Status: DISCONTINUED | OUTPATIENT
Start: 2024-11-21 | End: 2024-11-21 | Stop reason: SDUPTHER

## 2024-11-21 RX ORDER — FENTANYL CITRATE 50 UG/ML
50 INJECTION, SOLUTION INTRAMUSCULAR; INTRAVENOUS
Status: DISCONTINUED | OUTPATIENT
Start: 2024-11-21 | End: 2024-11-23

## 2024-11-21 RX ORDER — ATROPINE SULFATE 0.1 MG/ML
INJECTION INTRAVENOUS
Status: DISPENSED
Start: 2024-11-21 | End: 2024-11-22

## 2024-11-21 RX ORDER — FENTANYL CITRATE 50 UG/ML
25 INJECTION, SOLUTION INTRAMUSCULAR; INTRAVENOUS
Status: DISCONTINUED | OUTPATIENT
Start: 2024-11-21 | End: 2024-11-21

## 2024-11-21 RX ORDER — FENTANYL CITRATE 50 UG/ML
25 INJECTION, SOLUTION INTRAMUSCULAR; INTRAVENOUS
Status: DISCONTINUED | OUTPATIENT
Start: 2024-11-21 | End: 2024-11-23

## 2024-11-21 RX ORDER — ALBUTEROL SULFATE 90 UG/1
2 INHALANT RESPIRATORY (INHALATION) PRN
Status: DISCONTINUED | OUTPATIENT
Start: 2024-11-21 | End: 2024-12-06 | Stop reason: HOSPADM

## 2024-11-21 RX ORDER — NOREPINEPHRINE BITARTRATE 0.06 MG/ML
INJECTION, SOLUTION INTRAVENOUS
Status: COMPLETED
Start: 2024-11-21 | End: 2024-11-21

## 2024-11-21 RX ORDER — PROPOFOL 10 MG/ML
INJECTION, EMULSION INTRAVENOUS
Status: DISCONTINUED
Start: 2024-11-21 | End: 2024-11-21

## 2024-11-21 RX ORDER — CEFAZOLIN SODIUM 1 G/3ML
INJECTION, POWDER, FOR SOLUTION INTRAMUSCULAR; INTRAVENOUS
Status: DISCONTINUED | OUTPATIENT
Start: 2024-11-21 | End: 2024-11-21 | Stop reason: SDUPTHER

## 2024-11-21 RX ORDER — DEXMEDETOMIDINE HYDROCHLORIDE 4 UG/ML
.1-1.5 INJECTION, SOLUTION INTRAVENOUS CONTINUOUS
Status: DISCONTINUED | OUTPATIENT
Start: 2024-11-21 | End: 2024-11-23

## 2024-11-21 RX ORDER — FENTANYL CITRATE-0.9 % NACL/PF 10 MCG/ML
25-200 PLASTIC BAG, INJECTION (ML) INTRAVENOUS CONTINUOUS
Status: DISCONTINUED | OUTPATIENT
Start: 2024-11-21 | End: 2024-11-21

## 2024-11-21 RX ORDER — HYDROCORTISONE SODIUM SUCCINATE 100 MG/2ML
100 INJECTION INTRAMUSCULAR; INTRAVENOUS EVERY 8 HOURS
Status: DISCONTINUED | OUTPATIENT
Start: 2024-11-21 | End: 2024-11-26

## 2024-11-21 RX ORDER — BUDESONIDE 0.25 MG/2ML
250 INHALANT ORAL
Status: DISCONTINUED | OUTPATIENT
Start: 2024-11-21 | End: 2024-12-06 | Stop reason: HOSPADM

## 2024-11-21 RX ADMIN — SODIUM CHLORIDE: 9 INJECTION, SOLUTION INTRAVENOUS at 08:17

## 2024-11-21 RX ADMIN — PREDNISONE 60 MG: 10 TABLET ORAL at 02:08

## 2024-11-21 RX ADMIN — ATORVASTATIN CALCIUM 20 MG: 20 TABLET, FILM COATED ORAL at 20:28

## 2024-11-21 RX ADMIN — INSULIN LISPRO 1 UNITS: 100 INJECTION, SOLUTION INTRAVENOUS; SUBCUTANEOUS at 16:11

## 2024-11-21 RX ADMIN — BUDESONIDE 250 MCG: 0.25 INHALANT RESPIRATORY (INHALATION) at 20:46

## 2024-11-21 RX ADMIN — DOXYCYCLINE 100 MG: 100 INJECTION, POWDER, LYOPHILIZED, FOR SOLUTION INTRAVENOUS at 15:55

## 2024-11-21 RX ADMIN — SODIUM CHLORIDE: 9 INJECTION, SOLUTION INTRAVENOUS at 05:00

## 2024-11-21 RX ADMIN — Medication 0.4 MCG/KG/HR: at 20:27

## 2024-11-21 RX ADMIN — Medication 0.2 MCG/KG/HR: at 09:47

## 2024-11-21 RX ADMIN — IPRATROPIUM BROMIDE AND ALBUTEROL SULFATE 1 DOSE: .5; 3 SOLUTION RESPIRATORY (INHALATION) at 01:07

## 2024-11-21 RX ADMIN — SODIUM CHLORIDE, PRESERVATIVE FREE 10 ML: 5 INJECTION INTRAVENOUS at 20:20

## 2024-11-21 RX ADMIN — INSULIN GLARGINE 10 UNITS: 100 INJECTION, SOLUTION SUBCUTANEOUS at 07:54

## 2024-11-21 RX ADMIN — INSULIN LISPRO 1 UNITS: 100 INJECTION, SOLUTION INTRAVENOUS; SUBCUTANEOUS at 12:21

## 2024-11-21 RX ADMIN — NOREPINEPHRINE BITARTRATE 5 MCG/MIN: 0.06 INJECTION, SOLUTION INTRAVENOUS at 12:14

## 2024-11-21 RX ADMIN — DOXYCYCLINE 100 MG: 100 INJECTION, POWDER, LYOPHILIZED, FOR SOLUTION INTRAVENOUS at 02:06

## 2024-11-21 RX ADMIN — BUDESONIDE 250 MCG: 0.25 INHALANT RESPIRATORY (INHALATION) at 08:57

## 2024-11-21 RX ADMIN — SODIUM CHLORIDE, PRESERVATIVE FREE 10 ML: 5 INJECTION INTRAVENOUS at 08:30

## 2024-11-21 RX ADMIN — PERFLUTREN 10 ML: 6.52 INJECTION, SUSPENSION INTRAVENOUS at 15:46

## 2024-11-21 RX ADMIN — CEFAZOLIN 2 G: 1 INJECTION, POWDER, FOR SOLUTION INTRAMUSCULAR; INTRAVENOUS at 08:26

## 2024-11-21 RX ADMIN — WATER 2000 MG: 1 INJECTION INTRAMUSCULAR; INTRAVENOUS; SUBCUTANEOUS at 20:20

## 2024-11-21 RX ADMIN — PROPOFOL 40 MCG/KG/MIN: 10 INJECTION, EMULSION INTRAVENOUS at 06:38

## 2024-11-21 RX ADMIN — SODIUM CHLORIDE: 9 INJECTION, SOLUTION INTRAVENOUS at 18:20

## 2024-11-21 RX ADMIN — PROPOFOL 30 MCG/KG/MIN: 10 INJECTION, EMULSION INTRAVENOUS at 02:17

## 2024-11-21 RX ADMIN — TIOTROPIUM BROMIDE AND OLODATEROL 2 PUFF: 3.124; 2.736 SPRAY, METERED RESPIRATORY (INHALATION) at 09:02

## 2024-11-21 RX ADMIN — PROPOFOL 40 MG: 10 INJECTION, EMULSION INTRAVENOUS at 08:23

## 2024-11-21 RX ADMIN — Medication 5 MCG/MIN: at 12:14

## 2024-11-21 ASSESSMENT — PULMONARY FUNCTION TESTS
PIF_VALUE: 18
PIF_VALUE: 26
PIF_VALUE: 24
PIF_VALUE: 20
PIF_VALUE: 25
PIF_VALUE: 21
PIF_VALUE: 16

## 2024-11-21 ASSESSMENT — COPD QUESTIONNAIRES: CAT_SEVERITY: SEVERE

## 2024-11-21 NOTE — CARE COORDINATION
Case Management Assessment Initial Evaluation    Date/Time of Evaluation: 2024 12:36 PM  Assessment Completed by: Amber Culp RN    If patient is discharged prior to next notation, then this note serves as note for discharge by case management.    Patient Name: Priya Huynh                   YOB: 1965  Diagnosis: Pyelonephritis [N12]  Sepsis (HCC) [A41.9]                   Date / Time: 2024  4:47 PM  Location: [address] [place] [address]     Patient Admission Status: Inpatient   Readmission Risk Low 0-14, Mod 15-19), High > 20: Readmission Risk Score: 14.9    Current PCP: Parmjit Tesfaye MD  Health Care Decision Makers:   Primary Decision Maker: viky huynh - Child - [phone]    Primary Decision Maker: kristopherevelynn - Child - [phone]    Additional Case Management Notes: Presented to [facility] ED with c/o left lower back pain and SOB and was admitted on 24. Found to have left pyelonephritis and left renal pelvis stone. RAINE. Transferred to [facility] 4K stepdown unit. Urology consulted. Rapid response last evening for decreased mentation, afib 's. Given 1L fld bolus. Transferred to ICU and intubated for airway protection. Taken to surgery today for cystoscopy with left ureteral stent. Unable to extubate post-op d/t tongue swelling and no cuff leak. Plan to change diprivan to precedex.     Remains on vent w/ETT on AC/PC, peep 8, FIO2 40%, sats 100%.Tmax 104.5. NSR. Unable to follow commands; VU x4 to painful stim. Intensivist and Urology following. Telemetry, CVC, montague, SCDs. Precedex @ 0.4 mcg/kg/hr, nebs, IV rocephin daily, IV doxycycline Q12H, prn IV fentanyl, lantus, SSI, inhaler, prednisone. Received IV albumin x1 yesterday. Received 2g Ca+ gluconate and 4g Mag sulfate x1 today. Blood, respiratory, and urine cultures sent.     Procedures:    CVC RIJ - placed at [facility]   Intubated   Cystoscopy with left ureteral stent insertion    Imagin/21 Renal  \"Get off the vent, infection under control, and back home.\"

## 2024-11-21 NOTE — CONSULTS
22, BUN 38, creatinine 2.6, anion gap 13, calcium ionized 1.07, magnesium 1.1, lactate 6.1  WBC 16.4, hemoglobin 10.3, platelets 194  Albumin 2.7, alk phos 99, ALT 21, AST 28, total bilirubin 0.5  TSH 1.96  Chest x-ray pending  Telemetry shows sinus tachycardia      Case and plan discussed with JANET Pan         Electronically signed by Payam Salazar MD  CRITICAL CARE SPECIALIST

## 2024-11-21 NOTE — CONSULTS
[facility]  STRZ ICU 4D  [address] [address]  Dept: [phone]  Loc: [phone]  Visit Date: 11/20/2024    Urology Consult Note    Reason for Consult:  pyelonephritis with left sided stone   Requesting Physician:  medicine    History Obtained From:  patient, electronic medical record    Chief Complaint: Sepsis secondary to pyelonephritis     HISTORY OF PRESENT ILLNESS:                The patient is a 59 y.o. female with significant past medical history of hypertension, hyperlipidemia, COPD, and chronic respiratory failure with hypoxia who was transferred from an outside facility for sepsis secondary to left-sided pyelonephritis with an associated left-sided nephrolithiasis.  who presents with to OSH with left side flank/back pain  Found to have stone  Condition progressively worsened overnight and was eventually transferred to ICU for intubation  Ct at OSH showed 1.1cmm size stone in L renal pelvis with associated caliectasis of the L kidney  L fat stranding and inflammation is also present    Past Medical History:    No past medical history on file.  Past Surgical History:    No past surgical history on file.  Allergies:  Patient has no allergy information on record.  Social History:  Social History     Socioeconomic History    Marital status:      Spouse name: Not on file    Number of children: Not on file    Years of education: Not on file    Highest education level: Not on file   Occupational History    Not on file   Tobacco Use    Smoking status: Not on file    Smokeless tobacco: Not on file   Substance and Sexual Activity    Alcohol use: Not on file    Drug use: Not on file    Sexual activity: Not on file   Other Topics Concern    Not on file   Social History Narrative    Not on file     Social Determinants of Health     Financial Resource Strain: Not on file   Food Insecurity: Not on file   Transportation Needs: Not on file   Physical Activity:

## 2024-11-21 NOTE — OP NOTE
Operative Note      Patient: Priya Lema  YOB: 1965  MRN: [id number]    Date of Procedure: 11/21/2024    Pre-Op Diagnosis Codes:      * Kidney stone [N20.0]    Post-Op Diagnosis: Same       Procedure(s):  CYSTOSCOPY LEFT URETERAL STENT INSERTION    Surgeon(s):  Zurdo Richardson MD    Assistant:   * No surgical staff found *    Anesthesia: Monitor Anesthesia Care    Estimated Blood Loss (mL): Minimal    Complications: None    Specimens:   * No specimens in log *    Implants:  Implant Name Type Inv. Item Serial No.  Lot No. LRB No. Used Action   STENT URETERAL 6 FRX24 CM SOFT MONOFILAMENT TRIA - [id number]  STENT URETERAL 6 FRX24 CM SOFT MONOFILAMENT TRIA  [employer]- [id number] Left 1 Implanted         Drains:   NG/OG/NJ/NE Tube Orogastric Center mouth (Active)   Surrounding Skin Clean, dry & intact 11/21/24 1000   Securement device Tape 11/21/24 1000   Status Clamped 11/21/24 1000   Placement Verified External Catheter Length 11/21/24 1000   NG/OG/NJ/NE External Measurement (cm) 56 cm 11/21/24 1000       Urinary Catheter 11/21/24 Salazar (Active)       [REMOVED] Urinary Catheter 11/20/24 Salazar (Removed)   $ Urethral catheter insertion $ Not inserted for procedure 11/20/24 1700   Catheter Indications Need for fluid volume management of the critically ill patient in a critical care setting 11/21/24 1100   Site Assessment [place] 11/21/24 1100   Urine Color Yellow 11/21/24 1100   Urine Appearance Clear 11/21/24 1100   Collection Container Standard 11/21/24 1100   Securement Method Securing device (Describe) 11/21/24 1100   Catheter Care  Soap and water 11/20/24 2145   Catheter Best Practices  Drainage tube clipped to bed;Catheter secured to thigh;Bag below bladder;Bag not on floor;Drainage bag less than half full 11/21/24 1100   Status Draining 11/21/24 1100   Output (mL) 125 mL 11/21/24 1200       Findings:  Infection Present At Time Of Surgery (PATOS) (choose all levels that have

## 2024-11-21 NOTE — PLAN OF CARE
Problem: Discharge Planning  Goal: Discharge to home or other facility with appropriate resources  11/21/2024 1408 by Olimpia Enriquez LSW  Outcome: Progressing  Flowsheets (Taken 11/21/2024 0800 by Kayce Flores RN)  Discharge to home or other facility with appropriate resources: Identify barriers to discharge with patient and caregiver       Consult received. Please see SW note dated 11/21.

## 2024-11-21 NOTE — PROCEDURES
PROCEDURE NOTE  Date: 11/21/2024   Name: Priya Lema  YOB: 1965    Intubation    Date/Time: 11/21/2024 5:04 AM    Performed by: Harley Barbosa DO  Authorized by: Harley Barbosa DO  Consent: The procedure was performed in an emergent situation.  Patient identity confirmed: arm band and hospital-assigned identification number  Time out: Immediately prior to procedure a \"time out\" was called to verify the correct patient, procedure, equipment, support staff and site/side marked as required.  Indications: respiratory distress, respiratory failure, hypoxemia and hypercapnia  Intubation method: video-assisted  Patient status: sedated  Preoxygenation: nonrebreather mask  Pretreatment medications: midazolam  Sedatives: etomidate  Laryngoscope size: Mac 4  Tube size: 7.5 mm  Tube type: cuffed  Number of attempts: 1  Ventilation between attempts: BVM  Cords visualized: yes  Post-procedure assessment: chest rise and CO2 detector  Breath sounds: equal  Cuff inflated: yes  ETT to lip: 23 cm  Tube secured with: ETT rosales  Chest x-ray interpreted by me.  Chest x-ray findings: endotracheal tube in appropriate position  Patient tolerance: patient tolerated the procedure well with no immediate complications

## 2024-11-21 NOTE — PROGRESS NOTES
[facility]  SPEECH THERAPY  STRZ ICU 4D  Speech Evaluation - Early Mobility    Discharge Recommendations: Continue to Assess Pending Progress    SLP Individual Minutes  Time In: 1458  Time Out: 1507  Minutes: 9  Timed Code Treatment Minutes: 0 Minutes       Date: 2024  Patient Name: Priya Lema      CSN: [id number]   : 1965  (59 y.o.)  Gender: female   Referring Physician:  Shireen Murillo MD   Diagnosis: Pyelonephritis  Restrictions: Bilateral wrist restraints, oral intubation     History of Present Illness/Injury: Patient admitted to [facility] for above dx. Per chart review, 59-year-old female with past medical history of hypertension, hyperlipidemia, COPD (on 2 L O2 at baseline) presented to [facility] from outside facility secondary to sepsis with left-sided pyelonephritis due to left-sided nephrolithiasis.  Patient presented to outside facility with left back pain/flank pain.  Denied any urinary symptoms.  CT A/P reported nephrolithiasis.  Patient was transferred to [facility] for urological evaluation.  Patient was admitted to the floor.  Overnight rapid response was called on 2024 secondary to decreased mentation, sinus tachycardia in the 160s, and concerns for airway protection.  At bedside patient given 1 L fluid boluses.  Patient was started on nasal cannula and switched to nonrebreather mask, saturation did not improve and patient was intubated and brought to ICU.     : Patient at bedside, patient just had the cystoscopy and left ureteral stent placement. On .  Patient is on minimal vent setting.  SBT failed due to increased secretion and abdominal swelling, sedation switched from propofol to Precedex.  Patient already gotten 60 mL of Decadron this morning.\"      No past medical history on file.    SUBJECTIVE:  Spoke with YONNY Devries for approval of interventions. Upon arrival, patient laying in bed. Alert with call of name and sternal rub. Lethargy persistent

## 2024-11-21 NOTE — PROGRESS NOTES
Spiritual Health History and Assessment/Progress Note  [facility]    (P) Initial Encounter, Spiritual/Emotional Needs,  ,  ,      Name: Priya Lema MRN: [id number]    Age: 59 y.o.     Sex: female   Language: English   Uatsdin: None   Pyelonephritis     Date: 11/21/2024            Total Time Calculated: (P) 9 min              Spiritual Assessment began in STRZ ICU 4D        Referral/Consult From: (P) Rounding   Encounter Overview/Reason: (P) Initial Encounter, Spiritual/Emotional Needs  Service Provided For: (P) Patient and family together    [place], Belief, Meaning:   Patient unable to assess at this time  Family/Friends Other: Unable to assess meaning of family present      Importance and Influence:  Patient unable to assess at this time  Family/Friends have spiritual/personal beliefs that influence decisions regarding the patient's health    Community:  Patient Other: Unable to assess at this time, pt intubated and sedated  Family/Friends feel well-supported. Support system includes: Parent/s and Extended family    Assessment and Plan of Care:     Patient Interventions include: Other:  prayed for patient bedside, with family  Family/Friends Interventions include: Facilitated expression of thoughts and feelings, Explored spiritual coping/struggle/distress, Affirmed coping skills/support systems, and Other:  prayed for patient bedside, with family    Patient Plan of Care: Spiritual Care available upon further referral  Family/Friends Plan of Care: Spiritual Care available upon further referral    Electronically signed by Chaplain Trevon on 11/21/2024 at 1:37 PM

## 2024-11-21 NOTE — PROCEDURES
PROCEDURE NOTE  Date: 11/20/2024   Name: Priya Lema  YOB: 1965    Procedures        EKG was completed and handed to RN

## 2024-11-21 NOTE — PROGRESS NOTES
CRITICAL CARE PROGRESS NOTE      [facility]    Unit[facility]4D-09/009-A  YOB: 1965  MRN: [id number]   PCP: Parmjit Tesfaye MD  Date of Admission: 11/20/2024  Chief Complaint:-Pyelonephritis    Assessment and Plan:    Sepsis secondary to left nephrolithiasis complicated with left pyelonephritis: On admission sofa score 8, lactic acid 6.1.  CT scan at outside facility showed nephrolithiasis on the left side, and cystic characteristic on the right kidney.  US renal 11/21: Left nephrolithiasis with mild hydronephrosis.  UA 11/20: WBC > 100, no casts, leukocyte Estrace moderate. worsening leukocytosis, lactic acid resolved..   Continue Rocephin 2 g.  Urology following, recommendation appreciated.   Left ureteral stent placed on 11/21.   Urine culture ordered, Bclx ordered.  Acute on chronic hypoxic/hypercapnic respiratory failure secondary to COPD exacerbation: On 2 L oxygen at home.  Intubated on 11/20 for airway protection.  Rapid response was called on 11/20, as patient was being hypoxic not improvement with supplemental oxygen and on nonrebreather mask.  Patient was intubated on the floors and transferred to ICU.  ABG 7.30/49/120/24.   Probable switch to Precedex due to increased tongue swelling.  Plan for surgery today will continue with ventilation, wean off ventilator support as needed postsurgery.   Continue on Stiolto 2puffs daily. Albuterol ordered PRN.   RAINE, pre-renal, improving: FeNa <0.6%. US reported (11/21): Left renal stone, mild left hydronephrosis, right kidney to cyst.  No eosinophils on urine smear.    Continue NS 0.9% mL/h  Monitor Creatinine and BUN with daily labs.  Acute normal anemia secondary to dilution: Hemoglobin 8.8 from 10.0 (baseline unknown), patient received about 1 L bolus of fluid during rapid response.   Continue monitoring H&H with daily CBC.   Repeat H&H ordered for today.   Monitor for any acute signs of bleed, or patient being hypotensive,  tachycardic.   HTN: Holding lisinopril in setting of RAINE.  Continuing with Toprol-XL 50mh/daily.   NIDDM 2: Unsure of home medications.  No medications in EMR to review.   Started on low-dose sliding scale insulin for  HLD: Continue atorvastatin.   CARROLL: At home on CPAP; setting of  12/5.   COPD/restrictive lung disease: At home albuterol and Trelegy Ellipta.  PFT 11/21/2023: FEV1/FVC 83%, FEV1 45%, FVC 43%, TLC 64% DLCO 52%, DLCO/VA 72%.   Chronic tobacco abuse: 0.5 packs/day for the last 40 years.     INITIAL H AND P AND ICU COURSE:  59-year-old female with past medical history of hypertension, hyperlipidemia, COPD (on 2 L O2 at baseline) presented to [facility] from outside facility secondary to sepsis with left-sided pyelonephritis due to left-sided nephrolithiasis.  Patient presented to outside facility with left back pain/flank pain.  Denied any urinary symptoms.  CT A/P reported nephrolithiasis.  Patient was transferred to [facility] for urological evaluation.  Patient was admitted to the floor.  Overnight rapid response was called on 11/20/2024 secondary to decreased mentation, sinus tachycardia in the 160s, and concerns for airway protection.  At bedside patient given 1 L fluid boluses.  Patient was started on nasal cannula and switched to nonrebreather mask, saturation did not improve and patient was intubated and brought to ICU.    11/21: Patient at bedside, patient just had the cystoscopy and left ureteral stent placement. On 11/21.  Patient is on minimal vent setting.  SBT failed due to increased secretion and abdominal swelling, sedation switched from propofol to Precedex.  Patient already gotten 60 mL of Decadron this morning.       Past Medical History:  hypertension, hyperlipidemia, COPD (on 2 L O2 at baseline), NIDDM 2, CARROLL.  Family History: No family history on chart review..  Social History: Smokes about 0.5 packs/day for the past 40 years.  No reported alcohol/drug usage.    ROS   Unable to obtain patient is

## 2024-11-21 NOTE — ANESTHESIA PRE PROCEDURE
Department of Anesthesiology  Preprocedure Note       Name:  Priya Lema   Age:  59 y.o.  :  1965                                          MRN:  [id number]         Date:  2024      Surgeon: Surgeon(s):  Zurdo Richardson MD    Procedure: Procedure(s):  CYSTOSCOPY LEFT URETERAL STENT INSERTION    Medications prior to admission:   Prior to Admission medications    Not on File       Current medications:    Current Facility-Administered Medications   Medication Dose Route Frequency Provider Last Rate Last Admin   • doxycycline (VIBRAMYCIN) 100 mg in sodium chloride 0.9 % 100 mL IVPB  100 mg IntraVENous Q12H Harley Barbosa DO   Stopped at 24 0305   • budesonide (PULMICORT) nebulizer suspension 250 mcg  250 mcg Nebulization BID RT Harley Barbosa DO       • insulin glargine (LANTUS) injection vial 10 Units  10 Units SubCUTAneous Daily Harley Barbosa DO   10 Units at 24 0754   • [START ON 2024] predniSONE (DELTASONE) tablet 40 mg  40 mg Oral Daily Harley Barbosa DO       • albuterol sulfate HFA (PROVENTIL;VENTOLIN;PROAIR) 108 (90 Base) MCG/ACT inhaler 2 puff  2 puff Inhalation PRN Cory Carver, DO       • sodium chloride flush 0.9 % injection 5-40 mL  5-40 mL IntraVENous 2 times per day Kristian Lambert PA-C   10 mL at 24 0830   • sodium chloride flush 0.9 % injection 5-40 mL  5-40 mL IntraVENous PRN Kristian Lambert PA-C       • 0.9 % sodium chloride infusion   IntraVENous PRN Kristian Lambert PA-C       • ondansetron (ZOFRAN-ODT) disintegrating tablet 4 mg  4 mg Oral Q8H PRN Kristian Lambert PA-C        Or   • ondansetron (ZOFRAN) injection 4 mg  4 mg IntraVENous Q6H PRN Kristian Lambert PA-C       • polyethylene glycol (GLYCOLAX) packet 17 g  17 g Oral Daily PRN Kristian Lambert PA-C       • acetaminophen (TYLENOL) tablet 650 mg  650 mg Oral Q6H PRN Kristian Lambert PA-C        Or   • acetaminophen (TYLENOL) 
Labs/EKG/Imaging Studies

## 2024-11-21 NOTE — PLAN OF CARE
Problem: Discharge Planning  Goal: Discharge to home or other facility with appropriate resources  11/21/2024 0246 by Tuyet Marin RN  Outcome: Progressing  Flowsheets (Taken 11/20/2024 2300)  Discharge to home or other facility with appropriate resources: Identify barriers to discharge with patient and caregiver  11/20/2024 1811 by Otilia Pereyra RN  Outcome: Progressing  Flowsheets (Taken 11/20/2024 1700)  Discharge to home or other facility with appropriate resources:   Identify barriers to discharge with patient and caregiver   Arrange for needed discharge resources and transportation as appropriate   Identify discharge learning needs (meds, wound care, etc)     Problem: Pain  Goal: Verbalizes/displays adequate comfort level or baseline comfort level  11/21/2024 0246 by Tuyet Marin RN  Outcome: Progressing  11/20/2024 1811 by Otilia Pereyra RN  Outcome: Progressing  Flowsheets (Taken 11/20/2024 1645)  Verbalizes/displays adequate comfort level or baseline comfort level:   Encourage patient to monitor pain and request assistance   Assess pain using appropriate pain scale   Administer analgesics based on type and severity of pain and evaluate response   Implement non-pharmacological measures as appropriate and evaluate response     Problem: Neurosensory - Adult  Goal: Achieves stable or improved neurological status  11/21/2024 0246 by Tuyet Marin RN  Outcome: Progressing  Flowsheets (Taken 11/20/2024 2300)  Achieves stable or improved neurological status:   Assess for and report changes in neurological status   Initiate measures to prevent increased intracranial pressure   Maintain blood pressure and fluid volume within ordered parameters to optimize cerebral perfusion and minimize risk of hemorrhage   Monitor temperature, glucose, and sodium. Initiate appropriate interventions as ordered  11/20/2024 1811 by Otilia Pereyra, RN  Outcome: Progressing  Flowsheets (Taken 11/20/2024  patient for signs and symptoms of electrolyte imbalances  11/20/2024 1811 by Otilia Pereyra RN  Outcome: Progressing  Flowsheets (Taken 11/20/2024 1700)  Electrolytes maintained within normal limits:   Monitor labs and assess patient for signs and symptoms of electrolyte imbalances   Administer electrolyte replacement as ordered   Monitor response to electrolyte replacements, including repeat lab results as appropriate   Fluid restriction as ordered   Instruct patient on fluid and nutrition restrictions as appropriate  Goal: Hemodynamic stability and optimal renal function maintained  11/21/2024 0246 by Tuyet Marin RN  Outcome: Progressing  Flowsheets (Taken 11/20/2024 2300)  Hemodynamic stability and optimal renal function maintained: Monitor labs and assess for signs and symptoms of volume excess or deficit  11/20/2024 1811 by Otilia Pereyra RN  Outcome: Progressing  Flowsheets (Taken 11/20/2024 1700)  Hemodynamic stability and optimal renal function maintained:   Monitor labs and assess for signs and symptoms of volume excess or deficit   Monitor intake, output and patient weight   Monitor urine specific gravity, serum osmolarity and serum sodium as indicated or ordered   Monitor response to interventions for patient's volume status, including labs, urine output, blood pressure (other measures as available)   Encourage oral intake as appropriate   Instruct patient on fluid and nutrition restrictions as appropriate  Goal: Glucose maintained within prescribed range  11/21/2024 0246 by Tuyet Marin RN  Outcome: Progressing  Flowsheets (Taken 11/20/2024 2300)  Glucose maintained within prescribed range: Monitor blood glucose as ordered  11/20/2024 1811 by Otilia Pereyra, RN  Outcome: Progressing  Flowsheets (Taken 11/20/2024 1700)  Glucose maintained within prescribed range:   Monitor blood glucose as ordered   Assess for signs and symptoms of hyperglycemia and hypoglycemia   Administer ordered

## 2024-11-21 NOTE — PROGRESS NOTES
A size 7.5 endotracheal tube was successfully placed using a size 3 blade. The Lot number for he endotracheal tube was [id number]. Asssited Dr. Barbosa with intubation.

## 2024-11-21 NOTE — PROGRESS NOTES
Patient Weaning Progress    The patient's vent settings was able to be weaned this shift.      Ventilator settings that were weaned              [] Mode   [x] Pressure support weaned   [x] Fio2 weaned   [] Peep weaned      Spontaneous weaning trial  was not attempted.     due to defined parameters for SBT (spontaneous breathing trial) not being met.     *Results of SBT documented under SBTOUTCOME note.    Reason that defined ventilator parameters for SBT was not met              [] Patient condition requires increased ventilator settings  [x] Requires increased sedation   [] Settings not within weaning range   [] SAT not completed   [] Physician orders      Unable to get agreement for goals because no family is present and patient cannot respond.

## 2024-11-21 NOTE — CARE COORDINATION
DISCHARGE PLANNING EVALUATION  11/21/24, 2:18 PM EST    Reason for Referral: Family wants HH  Decision Maker: Normally independent, currently intubated. Sons making decisions  Current Services:  None  New Services Requested: Potential HH v. SNF  Family/ Social/ Home environment: Lives at home alone, has two sons and DILs that live nearby.  Payment Source: Medicaid  Transportation at Discharge: Undecided  Post-acute (PAC) provider list was provided to patient. Patient was informed of their freedom to choose PAC provider. Discussed and offered to show the patient the relevant PAC Providers quality and resource use measures on Medicare Compare web site via computer based on patient's goals of care and treatment preferences. Questions regarding selection process were answered.      Teach Back Method used with Priya's children regarding care plan and discharge planning.   Patient's children verbalized understanding of the plan of care and contribute to goal setting.       Patient preferences and discharge plan: Discharge plan is pending progress at this time as patient is currently intubated. SW did meet with children and introduced self and provided information on discharge plans. Will continue to follow.     Electronically signed by LYNDSYE Marino on 11/21/2024 at 2:18 PM

## 2024-11-21 NOTE — PROGRESS NOTES
Echo completed bedside, 2ml definity used, ROW Dr Werner to read verbally informed of preliminary findings [id number].

## 2024-11-21 NOTE — SIGNIFICANT EVENT
2050- YONNY Sales informed this RN of the patient shaking and breathing changes, this RN went into the room to evaluate patient, pulse ox unable to get a good pleth, BP unable to be obtained due to shaking, Heart rate sustaining in the 150s to 160s, this RN called the charge nurse YONNY Bingham to bedside to evaluate patient   2053- Rapid Response called   2054- Crash cart to room  2055- Resource, house supervisor and respiratory arrived   2056- Accu Check 228, Dr. Thompson arrived  2057- EKG arrived, /130 O2 96% on nonrebreather   2058- Hospiatlist Dmitri Guthrie NP and Tawanna York NP arrived, Chest x ray ordered per Dmitri Guthrie NP  2059- 0.9% Sodium Chloride started @ 100 mL/hour  2100- /100 O2 97% NRB    2101- 0.9 NaCl set to 999mL/hr, labs obtained via IJ catheter  2102- ABG obtained  2104- ICU resident order to go to ICU, decision to intubate bedside   2106- X ray arrived   2109-  O2 98% NRB  2110- Glydoscope arrived   2112-  O2 100%  2114- ET tube attempt by Tawanna York NP  2115- O2 95%, Hr 156  2118- B/P 199/80  O2 96%  2119- O2 90%  2118- 2nd attempt for ET tube by Tawanna York NP  2118- O2 80%  2120- B/P 158/73  O2 100%  2121- Dr. Barbosa placed ET tube 7.5 , 23 at lip, O2 100%  2122- , B/P 171/63, O2 100%, House supervisor calling family, patient son updated on move to ICU  2123- YONNY Van placed 16 Fr OG tube @ 60cm,  B/P 158/82 O2 100%  2125- Chest X ray obtained   2126- ET and OG tube placements verified   2127- Donnie RN, Tessy RN and this RN transporting patient to ICU room 9 2135- Bedside report given by this RN to ICU RN Zhanebeatriz Banuelos Given:  Atamadate- 10mL @ 2113, 10mL @ 2115 and 20 mL at 2117  Versed- 4 mL @ 2111  Propofol- 10mL @ 2118, 10mL @ 2121 and 10mL @ 2123

## 2024-11-22 ENCOUNTER — APPOINTMENT (OUTPATIENT)
Dept: ULTRASOUND IMAGING | Age: 59
End: 2024-11-22
Attending: STUDENT IN AN ORGANIZED HEALTH CARE EDUCATION/TRAINING PROGRAM
Payer: COMMERCIAL

## 2024-11-22 ENCOUNTER — APPOINTMENT (OUTPATIENT)
Dept: GENERAL RADIOLOGY | Age: 59
End: 2024-11-22
Attending: STUDENT IN AN ORGANIZED HEALTH CARE EDUCATION/TRAINING PROGRAM
Payer: COMMERCIAL

## 2024-11-22 LAB
ALBUMIN SERPL BCG-MCNC: 2.8 G/DL (ref 3.5–5.1)
ALP SERPL-CCNC: 96 U/L (ref 38–126)
ALT SERPL W/O P-5'-P-CCNC: 312 U/L (ref 11–66)
ANION GAP SERPL CALC-SCNC: 13 MEQ/L (ref 8–16)
APTT PPP: 22.5 SECONDS (ref 22–38)
AST SERPL-CCNC: 486 U/L (ref 5–40)
BACTERIA SPEC RESP CULT: NORMAL
BACTERIA UR CULT: ABNORMAL
BACTERIA UR CULT: ABNORMAL
BASOPHILS ABSOLUTE: 0 THOU/MM3 (ref 0–0.1)
BASOPHILS NFR BLD AUTO: 0.1 %
BILIRUB SERPL-MCNC: 0.4 MG/DL (ref 0.3–1.2)
BUN SERPL-MCNC: 51 MG/DL (ref 7–22)
CA-I BLD ISE-SCNC: 1.12 MMOL/L (ref 1.12–1.32)
CALCIUM SERPL-MCNC: 8.2 MG/DL (ref 8.5–10.5)
CHLORIDE SERPL-SCNC: 107 MEQ/L (ref 98–111)
CO2 SERPL-SCNC: 20 MEQ/L (ref 23–33)
CREAT SERPL-MCNC: 2.8 MG/DL (ref 0.4–1.2)
DEPRECATED RDW RBC AUTO: 45.1 FL (ref 35–45)
EKG ATRIAL RATE: 62 BPM
EKG P AXIS: 69 DEGREES
EKG P-R INTERVAL: 108 MS
EKG Q-T INTERVAL: 312 MS
EKG Q-T INTERVAL: 440 MS
EKG QRS DURATION: 86 MS
EKG QRS DURATION: 96 MS
EKG QTC CALCULATION (BAZETT): 418 MS
EKG QTC CALCULATION (BAZETT): 446 MS
EKG R AXIS: 43 DEGREES
EKG R AXIS: 46 DEGREES
EKG T AXIS: 48 DEGREES
EKG T AXIS: 86 DEGREES
EKG VENTRICULAR RATE: 108 BPM
EKG VENTRICULAR RATE: 62 BPM
EOSINOPHIL NFR BLD AUTO: 0.1 %
EOSINOPHILS ABSOLUTE: 0 THOU/MM3 (ref 0–0.4)
ERYTHROCYTE [DISTWIDTH] IN BLOOD BY AUTOMATED COUNT: 15.8 % (ref 11.5–14.5)
GFR SERPL CREATININE-BSD FRML MDRD: 19 ML/MIN/1.73M2
GLUCOSE BLD STRIP.AUTO-MCNC: 170 MG/DL (ref 70–108)
GLUCOSE BLD STRIP.AUTO-MCNC: 172 MG/DL (ref 70–108)
GLUCOSE BLD STRIP.AUTO-MCNC: 189 MG/DL (ref 70–108)
GLUCOSE BLD STRIP.AUTO-MCNC: 206 MG/DL (ref 70–108)
GLUCOSE SERPL-MCNC: 155 MG/DL (ref 70–108)
GRAM STN SPEC: NORMAL
HCT VFR BLD AUTO: 30.9 % (ref 37–47)
HEPARIN UNFRACTIONATED: 0.19 U/ML (ref 0.3–0.7)
HEPARIN UNFRACTIONATED: 0.21 U/ML (ref 0.3–0.7)
HEPARIN UNFRACTIONATED: < 0.04 U/ML (ref 0.3–0.7)
HGB BLD-MCNC: 9.7 GM/DL (ref 12–16)
IMM GRANULOCYTES # BLD AUTO: 0.11 THOU/MM3 (ref 0–0.07)
IMM GRANULOCYTES NFR BLD AUTO: 0.6 %
INR PPP: 1.22 (ref 0.85–1.13)
LIPASE SERPL-CCNC: 146.4 U/L (ref 5.6–51.3)
LYMPHOCYTES ABSOLUTE: 1.6 THOU/MM3 (ref 1–4.8)
LYMPHOCYTES NFR BLD AUTO: 8.7 %
MAGNESIUM SERPL-MCNC: 3.1 MG/DL (ref 1.6–2.4)
MCH RBC QN AUTO: 25 PG (ref 26–33)
MCHC RBC AUTO-ENTMCNC: 31.4 GM/DL (ref 32.2–35.5)
MCV RBC AUTO: 79.6 FL (ref 81–99)
MONOCYTES ABSOLUTE: 0.9 THOU/MM3 (ref 0.4–1.3)
MONOCYTES NFR BLD AUTO: 4.8 %
NEUTROPHILS ABSOLUTE: 15.3 THOU/MM3 (ref 1.8–7.7)
NEUTROPHILS NFR BLD AUTO: 85.7 %
NRBC BLD AUTO-RTO: 0 /100 WBC
ORGANISM: ABNORMAL
PLATELET # BLD AUTO: 202 THOU/MM3 (ref 130–400)
PMV BLD AUTO: 10.9 FL (ref 9.4–12.4)
POTASSIUM SERPL-SCNC: 4.2 MEQ/L (ref 3.5–5.2)
PROT SERPL-MCNC: 6.1 G/DL (ref 6.1–8)
RBC # BLD AUTO: 3.88 MILL/MM3 (ref 4.2–5.4)
SODIUM SERPL-SCNC: 140 MEQ/L (ref 135–145)
TROPONIN, HIGH SENSITIVITY: 121 NG/L (ref 0–12)
TROPONIN, HIGH SENSITIVITY: 125 NG/L (ref 0–12)
WBC # BLD AUTO: 17.9 THOU/MM3 (ref 4.8–10.8)

## 2024-11-22 PROCEDURE — 83735 ASSAY OF MAGNESIUM: CPT

## 2024-11-22 PROCEDURE — 6360000002 HC RX W HCPCS

## 2024-11-22 PROCEDURE — 2500000003 HC RX 250 WO HCPCS

## 2024-11-22 PROCEDURE — 74018 RADEX ABDOMEN 1 VIEW: CPT

## 2024-11-22 PROCEDURE — 82948 REAGENT STRIP/BLOOD GLUCOSE: CPT

## 2024-11-22 PROCEDURE — 2580000003 HC RX 258

## 2024-11-22 PROCEDURE — 2700000000 HC OXYGEN THERAPY PER DAY

## 2024-11-22 PROCEDURE — 6370000000 HC RX 637 (ALT 250 FOR IP)

## 2024-11-22 PROCEDURE — 85610 PROTHROMBIN TIME: CPT

## 2024-11-22 PROCEDURE — 99231 SBSQ HOSP IP/OBS SF/LOW 25: CPT | Performed by: UROLOGY

## 2024-11-22 PROCEDURE — 84484 ASSAY OF TROPONIN QUANT: CPT

## 2024-11-22 PROCEDURE — 85025 COMPLETE CBC W/AUTO DIFF WBC: CPT

## 2024-11-22 PROCEDURE — 80053 COMPREHEN METABOLIC PANEL: CPT

## 2024-11-22 PROCEDURE — 2000000000 HC ICU R&B

## 2024-11-22 PROCEDURE — 2580000003 HC RX 258: Performed by: PHYSICIAN ASSISTANT

## 2024-11-22 PROCEDURE — 36415 COLL VENOUS BLD VENIPUNCTURE: CPT

## 2024-11-22 PROCEDURE — 93010 ELECTROCARDIOGRAM REPORT: CPT | Performed by: INTERNAL MEDICINE

## 2024-11-22 PROCEDURE — 94640 AIRWAY INHALATION TREATMENT: CPT

## 2024-11-22 PROCEDURE — 6370000000 HC RX 637 (ALT 250 FOR IP): Performed by: PHYSICIAN ASSISTANT

## 2024-11-22 PROCEDURE — 71045 X-RAY EXAM CHEST 1 VIEW: CPT

## 2024-11-22 PROCEDURE — 76705 ECHO EXAM OF ABDOMEN: CPT

## 2024-11-22 PROCEDURE — 93005 ELECTROCARDIOGRAM TRACING: CPT

## 2024-11-22 PROCEDURE — 97166 OT EVAL MOD COMPLEX 45 MIN: CPT

## 2024-11-22 PROCEDURE — 82330 ASSAY OF CALCIUM: CPT

## 2024-11-22 PROCEDURE — 85730 THROMBOPLASTIN TIME PARTIAL: CPT

## 2024-11-22 PROCEDURE — 94761 N-INVAS EAR/PLS OXIMETRY MLT: CPT

## 2024-11-22 PROCEDURE — 83690 ASSAY OF LIPASE: CPT

## 2024-11-22 PROCEDURE — P9047 ALBUMIN (HUMAN), 25%, 50ML: HCPCS

## 2024-11-22 PROCEDURE — 85520 HEPARIN ASSAY: CPT

## 2024-11-22 RX ORDER — HEPARIN SODIUM 10000 [USP'U]/100ML
5-30 INJECTION, SOLUTION INTRAVENOUS CONTINUOUS
Status: DISCONTINUED | OUTPATIENT
Start: 2024-11-22 | End: 2024-12-05

## 2024-11-22 RX ORDER — BUMETANIDE 0.25 MG/ML
0.5 INJECTION, SOLUTION INTRAMUSCULAR; INTRAVENOUS ONCE
Status: COMPLETED | OUTPATIENT
Start: 2024-11-22 | End: 2024-11-22

## 2024-11-22 RX ORDER — MIDAZOLAM HYDROCHLORIDE 1 MG/ML
INJECTION, SOLUTION INTRAMUSCULAR; INTRAVENOUS
Status: COMPLETED
Start: 2024-11-22 | End: 2024-11-22

## 2024-11-22 RX ORDER — DEXAMETHASONE SODIUM PHOSPHATE 4 MG/ML
4 INJECTION, SOLUTION INTRA-ARTICULAR; INTRALESIONAL; INTRAMUSCULAR; INTRAVENOUS; SOFT TISSUE EVERY 6 HOURS
Status: COMPLETED | OUTPATIENT
Start: 2024-11-22 | End: 2024-11-23

## 2024-11-22 RX ORDER — ALBUMIN (HUMAN) 12.5 G/50ML
25 SOLUTION INTRAVENOUS ONCE
Status: COMPLETED | OUTPATIENT
Start: 2024-11-22 | End: 2024-11-22

## 2024-11-22 RX ORDER — HEPARIN SODIUM 1000 [USP'U]/ML
4000 INJECTION, SOLUTION INTRAVENOUS; SUBCUTANEOUS ONCE
Status: COMPLETED | OUTPATIENT
Start: 2024-11-22 | End: 2024-11-22

## 2024-11-22 RX ORDER — HEPARIN SODIUM 1000 [USP'U]/ML
4000 INJECTION, SOLUTION INTRAVENOUS; SUBCUTANEOUS PRN
Status: DISCONTINUED | OUTPATIENT
Start: 2024-11-22 | End: 2024-12-05

## 2024-11-22 RX ORDER — METOPROLOL TARTRATE 1 MG/ML
5 INJECTION, SOLUTION INTRAVENOUS ONCE
Status: COMPLETED | OUTPATIENT
Start: 2024-11-22 | End: 2024-11-22

## 2024-11-22 RX ORDER — MIDAZOLAM HYDROCHLORIDE 1 MG/ML
4 INJECTION, SOLUTION INTRAMUSCULAR; INTRAVENOUS ONCE
Status: COMPLETED | OUTPATIENT
Start: 2024-11-22 | End: 2024-11-22

## 2024-11-22 RX ORDER — HEPARIN SODIUM 1000 [USP'U]/ML
2000 INJECTION, SOLUTION INTRAVENOUS; SUBCUTANEOUS PRN
Status: DISCONTINUED | OUTPATIENT
Start: 2024-11-22 | End: 2024-12-05

## 2024-11-22 RX ADMIN — ALBUMIN (HUMAN) 25 G: 0.25 INJECTION, SOLUTION INTRAVENOUS at 11:33

## 2024-11-22 RX ADMIN — INSULIN LISPRO 1 UNITS: 100 INJECTION, SOLUTION INTRAVENOUS; SUBCUTANEOUS at 21:51

## 2024-11-22 RX ADMIN — FENTANYL CITRATE 50 MCG: 50 INJECTION, SOLUTION INTRAMUSCULAR; INTRAVENOUS at 20:18

## 2024-11-22 RX ADMIN — DEXAMETHASONE SODIUM PHOSPHATE 4 MG: 4 INJECTION, SOLUTION INTRA-ARTICULAR; INTRALESIONAL; INTRAMUSCULAR; INTRAVENOUS; SOFT TISSUE at 16:30

## 2024-11-22 RX ADMIN — FENTANYL CITRATE 25 MCG: 50 INJECTION, SOLUTION INTRAMUSCULAR; INTRAVENOUS at 02:53

## 2024-11-22 RX ADMIN — CEFEPIME 2000 MG: 2 INJECTION, POWDER, FOR SOLUTION INTRAVENOUS at 12:57

## 2024-11-22 RX ADMIN — DOXYCYCLINE 100 MG: 100 INJECTION, POWDER, LYOPHILIZED, FOR SOLUTION INTRAVENOUS at 14:23

## 2024-11-22 RX ADMIN — MIDAZOLAM HYDROCHLORIDE 4 MG: 1 INJECTION, SOLUTION INTRAMUSCULAR; INTRAVENOUS at 21:54

## 2024-11-22 RX ADMIN — INSULIN GLARGINE 10 UNITS: 100 INJECTION, SOLUTION SUBCUTANEOUS at 08:58

## 2024-11-22 RX ADMIN — DOXYCYCLINE 100 MG: 100 INJECTION, POWDER, LYOPHILIZED, FOR SOLUTION INTRAVENOUS at 01:38

## 2024-11-22 RX ADMIN — HEPARIN SODIUM 2000 UNITS: 1000 INJECTION INTRAVENOUS; SUBCUTANEOUS at 22:03

## 2024-11-22 RX ADMIN — ATORVASTATIN CALCIUM 20 MG: 20 TABLET, FILM COATED ORAL at 22:33

## 2024-11-22 RX ADMIN — AMIODARONE HYDROCHLORIDE 0.5 MG/MIN: 1.8 INJECTION, SOLUTION INTRAVENOUS at 22:30

## 2024-11-22 RX ADMIN — FENTANYL CITRATE 50 MCG: 50 INJECTION, SOLUTION INTRAMUSCULAR; INTRAVENOUS at 12:00

## 2024-11-22 RX ADMIN — HEPARIN SODIUM 2000 UNITS: 1000 INJECTION INTRAVENOUS; SUBCUTANEOUS at 15:17

## 2024-11-22 RX ADMIN — BUMETANIDE 0.5 MG: 0.25 INJECTION INTRAMUSCULAR; INTRAVENOUS at 11:53

## 2024-11-22 RX ADMIN — AMIODARONE HYDROCHLORIDE 150 MG: 1.5 INJECTION, SOLUTION INTRAVENOUS at 16:28

## 2024-11-22 RX ADMIN — AMIODARONE HYDROCHLORIDE 1 MG/MIN: 1.8 INJECTION, SOLUTION INTRAVENOUS at 16:36

## 2024-11-22 RX ADMIN — Medication 1.4 MCG/KG/HR: at 23:09

## 2024-11-22 RX ADMIN — CEFEPIME 1000 MG: 1 INJECTION, POWDER, FOR SOLUTION INTRAMUSCULAR; INTRAVENOUS at 23:11

## 2024-11-22 RX ADMIN — SODIUM CHLORIDE, PRESERVATIVE FREE 10 ML: 5 INJECTION INTRAVENOUS at 08:32

## 2024-11-22 RX ADMIN — FENTANYL CITRATE 25 MCG: 50 INJECTION, SOLUTION INTRAMUSCULAR; INTRAVENOUS at 07:32

## 2024-11-22 RX ADMIN — MIDAZOLAM 4 MG: 1 INJECTION INTRAMUSCULAR; INTRAVENOUS at 21:54

## 2024-11-22 RX ADMIN — Medication 0.6 MCG/KG/HR: at 10:40

## 2024-11-22 RX ADMIN — METOPROLOL TARTRATE 5 MG: 5 INJECTION INTRAVENOUS at 08:26

## 2024-11-22 RX ADMIN — INSULIN LISPRO 1 UNITS: 100 INJECTION, SOLUTION INTRAVENOUS; SUBCUTANEOUS at 17:58

## 2024-11-22 RX ADMIN — HYDROCORTISONE SODIUM SUCCINATE 100 MG: 100 INJECTION, POWDER, FOR SOLUTION INTRAMUSCULAR; INTRAVENOUS at 00:40

## 2024-11-22 RX ADMIN — Medication 0.6 MCG/KG/HR: at 19:21

## 2024-11-22 RX ADMIN — BUDESONIDE 250 MCG: 0.25 INHALANT RESPIRATORY (INHALATION) at 20:33

## 2024-11-22 RX ADMIN — HYDROCORTISONE SODIUM SUCCINATE 100 MG: 100 INJECTION, POWDER, FOR SOLUTION INTRAMUSCULAR; INTRAVENOUS at 08:26

## 2024-11-22 RX ADMIN — HEPARIN SODIUM 10 UNITS/KG/HR: 10000 INJECTION, SOLUTION INTRAVENOUS at 10:31

## 2024-11-22 RX ADMIN — HEPARIN SODIUM 4000 UNITS: 1000 INJECTION INTRAVENOUS; SUBCUTANEOUS at 10:23

## 2024-11-22 RX ADMIN — BUDESONIDE 250 MCG: 0.25 INHALANT RESPIRATORY (INHALATION) at 07:48

## 2024-11-22 RX ADMIN — TIOTROPIUM BROMIDE AND OLODATEROL 2 PUFF: 3.124; 2.736 SPRAY, METERED RESPIRATORY (INHALATION) at 07:48

## 2024-11-22 RX ADMIN — DEXAMETHASONE SODIUM PHOSPHATE 4 MG: 4 INJECTION, SOLUTION INTRA-ARTICULAR; INTRALESIONAL; INTRAMUSCULAR; INTRAVENOUS; SOFT TISSUE at 12:00

## 2024-11-22 ASSESSMENT — PAIN SCALES - GENERAL
PAINLEVEL_OUTOF10: 5
PAINLEVEL_OUTOF10: 3
PAINLEVEL_OUTOF10: 0
PAINLEVEL_OUTOF10: 3
PAINLEVEL_OUTOF10: 0

## 2024-11-22 ASSESSMENT — PULMONARY FUNCTION TESTS
PIF_VALUE: 18
PIF_VALUE: 16
PIF_VALUE: 18

## 2024-11-22 ASSESSMENT — PAIN DESCRIPTION - LOCATION: LOCATION: BACK

## 2024-11-22 ASSESSMENT — PAIN DESCRIPTION - DESCRIPTORS: DESCRIPTORS: ACHING

## 2024-11-22 NOTE — PROGRESS NOTES
Jw Leonard, YOHANNES - CNP  Urology Progress Note    Subjective: Priya Lema is a 59 y.o. female.    s/p CYSTOSCOPY LEFT URETERAL STENT INSERTION on 11/20/2024 - 11/21/2024    His/Her current Diet is: Diet NPO Exceptions are: Sips of Water with Meds.    Since the previous note, the patient reports the following:  Intubated, sedated in ICU  No fevers or chills.  No nausea or vomiting.  No chest pain or shortness of breath.  No calf pain.  Pain Controlled.    Concerns for low UOP  PHYLLIS ordered - no hydro, no stent noted  KUB pending  No visualization of stent per nurse, family  Salazar in place + tea colored urine  Salazar flushes easily per nurse      1045 - KUB shows stent in good position      Vitals and Labs:  Patient Vitals for the past 24 hrs:   BP Temp Temp src Pulse Resp SpO2 Weight   11/22/24 0900 (!) 162/127 -- -- (!) 111 24 -- --   11/22/24 0857 (!) 162/127 -- -- 99 19 -- --   11/22/24 0800 (!) 153/124 98.9 °F (37.2 °C) Oral (!) 115 21 -- --   11/22/24 0700 (!) 161/90 -- -- 54 20 94 % --   11/22/24 0600 (!) 147/82 -- -- (!) 49 15 99 % 97.8 kg (215 lb 9.6 oz)   11/22/24 0500 (!) 152/91 -- -- 52 17 98 % --   11/22/24 0400 132/78 -- -- (!) 49 18 96 % --   11/22/24 0300 (!) 153/85 99.1 °F (37.3 °C) Oral 71 20 (!) 89 % --   11/22/24 0200 129/70 -- -- 55 20 98 % --   11/22/24 0100 112/72 -- -- 55 18 99 % --   11/22/24 0000 109/70 -- -- (!) 49 18 100 % --   11/21/24 2300 122/76 97.8 °F (36.6 °C) Oral 52 17 99 % --   11/21/24 2245 127/74 -- -- 56 18 100 % --   11/21/24 2230 119/75 -- -- 54 18 100 % --   11/21/24 2215 117/69 -- -- 53 19 100 % --   11/21/24 2200 116/74 -- -- 54 18 100 % --   11/21/24 2145 117/73 -- -- 53 18 100 % --   11/21/24 2130 119/69 -- -- 58 20 100 % --   11/21/24 2115 117/66 -- -- 56 18 100 % --   11/21/24 2100 133/76 -- -- 55 19 100 % --   11/21/24 2046 -- -- -- 56 18 100 % --   11/21/24 2045 128/79 -- -- 50 19 100 % --   11/21/24 2030 126/74 -- -- 58 16 98 % --   11/21/24 2015 126/75 --  28.1* 27.4* 30.9*   MCV 82.0 81.2  --  79.6*    150  --  202     Recent Labs     11/20/24  2155 11/21/24  0400 11/22/24  0300    137 140   K 4.1 4.0 4.2    103 107   CO2 19* 22* 20*   PHOS  --  4.2  --    BUN 38* 38* 51*   CREATININE 2.9* 2.6* 2.8*       Recent Labs     11/20/24  2208   COLORU YELLOW   PHUR 5.5   LABCAST 4-8 C.GRAN  NONE SEEN   WBCUA >100W/CLUMPS   RBCUA 15-20   YEAST FEW BUDDING   BACTERIA FEW   LEUKOCYTESUR MODERATE*   UROBILINOGEN 0.2   BILIRUBINUR NEGATIVE   BLOODU LARGE*       Physical Exam:  No acute distress.   Neck is supple  Regular rate and rhythm. Normal peripheral pulses  No accessory muscles of inspiration. Symmetric chest rise  Abdomen soft, non-tender, non-distended. No CVA tenderness. Salazar + tea colored urine  No calf pain. Minimal/no edema in bilateral lower extremities.  Skin is warm, dry  Psych: mood, affect normal    Additional Lab/Culture results:     Imaging Reviewed:     YOHANNES Olivarez CNP independently reviewed the images and verified the radiology reports from:    US RENAL COMPLETE    Result Date: 11/22/2024  PROCEDURE: US RENAL COMPLETE CLINICAL INFORMATION: Indication provided by the ordering physician as \"s/p stent, drop in UOP.\" TECHNIQUE: Ultrasound of the kidneys and urinary bladder was performed. Grayscale and color images were obtained. COMPARISON: Renal ultrasound 11/21/2024 FINDINGS: The right kidney measures 12.5 x 8.0 x 5.0 cm and the left kidney measures 11.6 x 6.2 x 6.2 cm. Renal cortical thickness is normal. An anechoic structure at the right mid kidney measures 5.2 cm. An anechoic structure at the right lower kidney measures 3.2 cm. There is no hydronephrosis or renal calculi. The urinary bladder is not visualized by the sonographer.     Probable right renal cysts. **This report has been created using voice recognition software. It may contain minor errors which are inherent in voice recognition technology.** Electronically signed

## 2024-11-22 NOTE — PLAN OF CARE
Problem: Discharge Planning  Goal: Discharge to home or other facility with appropriate resources  11/21/2024 2049 by Bhavana Mendez RN  Outcome: Progressing  Flowsheets (Taken 11/21/2024 2010)  Discharge to home or other facility with appropriate resources: Identify barriers to discharge with patient and caregiver     Problem: Pain  Goal: Verbalizes/displays adequate comfort level or baseline comfort level  Outcome: Progressing  Flowsheets    Problem: Neurosensory - Adult  Goal: Achieves stable or improved neurological status  Outcome: Progressing  Flowsheets  Taken 11/21/2024 2010 by Bhavana Mendez RN  Achieves stable or improved neurological status:   Assess for and report changes in neurological status   Initiate measures to prevent increased intracranial pressure    Problem: Neurosensory - Adult  Goal: Achieves maximal functionality and self care  Outcome: Progressing  Flowsheets  Taken 11/21/2024 2010 by Bhavana Mendez RN  Achieves maximal functionality and self care:   Monitor swallowing and airway patency with patient fatigue and changes in neurological status   Encourage and assist patient to increase activity and self care with guidance from physical therapy/occupational therapy    Problem: Respiratory - Adult  Goal: Achieves optimal ventilation and oxygenation  Outcome: Progressing  Flowsheets  Taken 11/21/2024 2010 by Bhavana Mendez RN  Achieves optimal ventilation and oxygenation:   Assess for changes in respiratory status   Assess for changes in mentation and behavior    Problem: Cardiovascular - Adult  Goal: Maintains optimal cardiac output and hemodynamic stability  Outcome: Progressing  Flowsheets  Taken 11/21/2024 2010 by Bhavana Mendez RN  Maintains optimal cardiac output and hemodynamic stability:   Monitor blood pressure and heart rate   Assess for signs of decreased cardiac output   Monitor urine output and notify Licensed Independent Practitioner for values outside of normal

## 2024-11-22 NOTE — PROGRESS NOTES
Patient Weaning Progress    The patient's vent settings was able to be weaned this shift.      Ventilator settings that were weaned              [x] Mode   [x] Pressure support weaned   [] Fio2 weaned   [] Peep weaned      Spontaneous weaning trial  was attempted.     due to defined parameters for SBT (spontaneous breathing trial) not being met.     *Results of SBT documented under SBTOUTCOME note.    Reason that defined ventilator parameters for SBT was not met              [] Patient condition requires increased ventilator settings  [] Requires increased sedation   [] Settings not within weaning range   [] SAT not completed   [] Physician orders    The patient was able to tolerate SBT.  SpO2 of 97 on 40% FiO2.  Spontanteous VT was 562 and RR 16 breaths/min.  The patient is currently on SBT    Evac tube was not  hooked up with continuous low suction(20-30mmHg)      Cuff was  deflated to determine cuff leak. A leak  was detected.   Unable to get agreement for goals because no family is present and patient cannot respond.

## 2024-11-22 NOTE — PLAN OF CARE
Problem: Respiratory - Adult  Goal: Achieves optimal ventilation and oxygenation  11/22/2024 0758 by Elfego Colmenares, [facility]  Outcome: Progressing                                                  Patient Weaning Progress    The patient's vent settings was able to be weaned this shift.      Ventilator settings that were weaned              [] Mode   [] Pressure support weaned   [] Fio2 weaned   [] Peep weaned      Spontaneous weaning trial  was attempted.     due to defined parameters for SBT (spontaneous breathing trial) not being met.     *Results of SBT documented under SBTOUTCOME note.    Reason that defined ventilator parameters for SBT was not met              [] Patient condition requires increased ventilator settings  [] Requires increased sedation   [] Settings not within weaning range   [] SAT not completed   [x] Physician orders No cuff leak    The patient was able to tolerate SBT.   RSBI  was 53  and SpO2 of 91 on 40% FiO2.  Spontanteous VT was 517 and RR 22 breaths/min.      Evac tube was  hooked up with continuous low suction(20-30mmHg)      Cuff was  deflated to determine cuff leak. A leak  was not detected.     Patient mutually agreed on goals.

## 2024-11-22 NOTE — CARE COORDINATION
11/22/24, 3:45 PM EST    DISCHARGE ON GOING EVALUATION    Priya MILIAN [facility] day: 2  Location: -09/009-A Reason for admit: Pyelonephritis [N12]  Sepsis (HCC) [A41.9]     Procedures:   11/20 CVC RIJ - placed at [facility]  11/20 Intubated  11/21 Cystoscopy with left ureteral stent insertion  11/21 ECHO: EF 30-35%; mod global hypokinesis    Imaging since last note:   11/22 Renal US: Probable right renal cysts.   11/22 KUB: Kidney stone left side. Double-J stent in good position.   11/22 CXR: 1. Mild cardiomegaly. ET tube and NG tube in good position. Right jugular line,  catheter tip at cavoatrial junction.  2. Moderate pneumonia/pulmonary edema left lung base and involving right lung  diffusely. Small effusion left side. Tiny effusion right side.  3. Normal appearance of chest has worsened since prior.  11/22 US Liver: 1. Normal liver ultrasound.  2. Probable right renal cyst.    Barriers to Discharge: Still no cuff leak noted this morning. Started on scheduled IV decadron. In afib and hypertensive this afternoon. Started on heparin drip.     Remains on vent w/ETT on AC/PC, peep 8, FIO2 40%, sats 100%.Tmax 99.1. Afib 110-120's. Follows commands x4. Intensivist and Urology following. Urine +gram neg bacilli - low colony count. Telemetry, CVC, OG to edid THOMPSON, Caroline. Precedex @ 0.6 mcg/kg/hr, heparin drip, nebs, IV cefepime Q12H, IV decadron 4 mg Q6H, IV doxycycline Q12H, prn IV fentanyl, lantus, SSI, inhaler, prednisone. Received IV albumin x1 and 0.5 mg IV bumex x1 today. BUN 51, creat 2.8, Mg 3.1, trop 125 - then 121, alb 2.8, alt 312, ast 486, lipase 146.4, wbc 17.9, hgb 9.7.     PCP: Parmjit Tesfaye MD  Readmission Risk Score: 16.1    Patient Goals/Plan/Treatment Preferences: Home alone with new HH. Wears 2L O2 ATC and 3 or 4L O2 at  thru [facility]. Has nebs. SW consulted. Monitor for possible LifeVest.

## 2024-11-22 NOTE — PROCEDURES
PROCEDURE NOTE  Date: 11/22/2024   Name: Priya Lema  YOB: 1965    Procedures    12 lead EKG completed. Results handed to RN

## 2024-11-22 NOTE — PROGRESS NOTES
Comprehensive Nutrition Assessment    Type and Reason for Visit:  Initial (New Vent)    Nutrition Recommendations/Plan:   If unable to extubate today; OK to start TF per Dr. Garvey.  Nepro at 10 mL/hr, advancing 10 mL/hr every 8 hours as tolerated to reach goal rate of 30 mL/hr for 24 hour infusion  Additional free water flushes per provider  Will monitor need for additional nutrition interventions as appropriate and able.      Malnutrition Assessment:  Malnutrition Status:  At risk for malnutrition (11/22/24 1519)    Context:  Acute Illness     Findings of the 6 clinical characteristics of malnutrition:  Energy Intake:  Mild decrease in energy intake (NPO status d/t intubation 11/20)  Weight Loss:   (weight appears stable per office visit weights in EMR)     Body Fat Loss:  No body fat loss     Muscle Mass Loss:  No muscle mass loss    Fluid Accumulation:  Mild Generalized   Strength:  Not Performed    Nutrition Assessment:     Pt. nutritionally compromised AEB intubated 11/20.  At risk for further nutrition compromise r/t severe sepsis 2/2 pyelonephritis, RAINE - ?no CKD hx, RR 11/20 d/t decreased mentation and transferred to ICU - intubated 11/20, s/p cystoscopy - L ureteral stent insertion (11/21),  and underlying medical condition (PMHx: HTN, COPD< T2DM, CARROLL, COPD, chronic tobacco abuse).       Nutrition Related Findings:   Pt. Report/Treatments/Miscellaneous: Intubated 11/20 - unable to extubate d/t tongue swelling. OK to start TF per Dr. Garvey if unable to extubate. UO: 375 mL over last 24 hours.   GI Status: BM - 11/20; hypoactive BS  Pertinent Labs (11/22): BUN 51, Creatinine 2.8, K 4.2, Mg 3.1, Phos 4.2 (11/21), glucose 155, POC glucose 162-196  Pertinent Meds: lipitor, bumex, cefepime, dexamethasone, IV doxycycline, heparin, solu-cortef, lantus, humalog, precedex, IVF, fentanyl      Wound Type: None       Current Nutrition Intake & Therapies:    Average Meal Intake: NPO     Diet NPO Exceptions are:      Goals:  Goals: Initiate nutrition support, by next RD assessment          Nutrition Monitoring and Evaluation:      Food/Nutrient Intake Outcomes: Progression of Nutrition, IVF Intake (Enteral nutrition vs extubation and PO diet initiation)  Physical Signs/Symptoms Outcomes: Biochemical Data, GI Status, Fluid Status or Edema, Hemodynamic Status, Nutrition Focused Physical Findings, Skin, Weight    Discharge Planning:    Too soon to determine     Alejandro Norton RD, LD  Contact: [phone]

## 2024-11-22 NOTE — PROGRESS NOTES
CRITICAL CARE PROGRESS NOTE      [facility]    Unit/Bed:4D-09/009-A  YOB: 1965  MRN: [id number]   PCP: Parmjit Tesfaye MD  Date of Admission: 11/20/2024  Chief Complaint:-Pyelonephritis    Assessment and Plan:    Sepsis secondary to left nephrolithiasis complicated with left pyelonephritis: CT scan at outside facility showed nephrolithiasis on the left side, and cystic characteristic on the right kidney.  US renal 11/21: Left nephrolithiasis with mild hydronephrosis.  Worsening leukocytosis, 17.9 from 12.0. BCLX no growth for 24hr. Reparatory panel negative. CXR 11/22 worsening pulmonary edema bilaterally, left-sided pleural effusion.  Blood gas pH 7.38, pCO2 49, pO2 120, bicarb 24.   Urine culture positive for gram-negative bacilli. (11/20)  Discontinued Rocephin and switched to cefepime and doxycycline, for additional coverage due to history of COPD.   Urology following, recommendation appreciated.   Left ureteral stent placed on 11/21.   Acute on chronic hypoxic/hypercapnic respiratory failure secondary to COPD exacerbation: On 2 L oxygen at home.  Intubated on 11/20 for airway protection.  Rapid response was called on 11/20, as patient was being hypoxic not improvement with supplemental oxygen and on nonrebreather mask.  Patient was intubated on the floors and transferred to ICU.    Continue on Precedex.   Daily CXR.   Unable to wean off ventilator (11/22), failed SBT due to no cuff leak.  Will try SBT tomorrow.   Started on Decadron 4 mg/q6hr for 2 days.   Continue on Stiolto 2puffs daily. Albuterol ordered PRN.   New onset A-fib with RVR: Telemonitoring noted to be at 130s. (No known previous history) EKG A-fib with RVR at 108.  Given 1 dose of 5 Mg Lopressor, with mild improvement.  Started on Amio and heparin GGT. resumed home Toprol with hold parameters.   Systolic heart failure complicated with A-fib with RVR: Echo 11/21/2024 reported EF 30 to 35%, reduced from EF 50 to  Referral was faxed at 12:37 p.m. twice-both confirmations received.  I called spec office/cassy and they stated they did not have the fax.  I re-faxed the referral twice.  Cassy/spec office confirmed receipt of fax.  Pt's  informed-lb    REF#[id number]. GALASSO-3 VISITS, EXPIRES 4/13/2021     [facility] referral issued, faxed to spec office, pt's  informed-lb   COURSE:  59-year-old female with past medical history of hypertension, hyperlipidemia, COPD (on 2 L O2 at baseline) presented to [facility] from outside facility secondary to sepsis with left-sided pyelonephritis due to left-sided nephrolithiasis.  Patient presented to outside facility with left back pain/flank pain.  Denied any urinary symptoms.  CT A/P reported nephrolithiasis.  Patient was transferred to [facility] for urological evaluation.  Patient was admitted to the floor.  Overnight rapid response was called on 11/20/2024 secondary to decreased mentation, sinus tachycardia in the 160s, and concerns for airway protection.  At bedside patient given 1 L fluid boluses.  Patient was started on nasal cannula and switched to nonrebreather mask, saturation did not improve and patient was intubated and brought to ICU.    11/21: Patient at bedside, patient just had the cystoscopy and left ureteral stent placement. On 11/21.  Patient is on minimal vent setting.  SBT failed due to increased secretion and abdominal swelling, sedation switched from propofol to Precedex.  Patient already gotten 60 mL of Decadron this morning.     11/22: Patient seen at bedside.  Patient intubated, SBT failed due to no cuff leak.  Started on Decadron 4 Mg/q6hr, for 2 days.  Will reassess SBT tomorrow.  Leukocytosis worsening, urine culture reported gram-negative bacteria, antibiotic switched from Rocephin to cefepime and doxycycline, due to history of COPD.  Patient's stent is patent, noted to have minimal urine production; overnight only produced 50 mL on the bladder scan and repeat bladder scan in a.m. showed 0 mL.  Was given 1 dose of albumin 25g followed with dose of Bumex, patient responded with 200 mL of urine output.  Continue with bladder scan.       Past Medical History:  hypertension, hyperlipidemia, COPD (on 2 L O2 at baseline), NIDDM 2, CARROLL.  Family History: No family history on chart review..  Social History: Smokes about 0.5 packs/day for

## 2024-11-22 NOTE — PROGRESS NOTES
Pharmacy Note - Extended Infusion Beta-Lactam Dose Adjustment    Cefepime 2000 mg q8h intermittent infusion for treatment of Community acquired pneumonia. Per [facility] Extended Infusion Beta-Lactam Policy, cefepime will be changed to 2000 mg loading dose followed by 1000 mg q12h extended infusion    Estimated Creatinine Clearance: Estimated Creatinine Clearance: 23 mL/min (A) (based on SCr of 2.8 mg/dL (H)).    Dialysis Status, RAINE, CKD: RAINE    BMI: Body mass index is 43.55 kg/m².    Rationale for Adjustment: Dose adjusted per [facility] Extended Infusion Policy based on renal function and indication. The above medication is renally eliminated and demonstrates time-dependent effects on bacterial eradication. Extended-infusion dosing strategy aims to enhance microbiologic and clinical efficacy.     Pharmacy will monitor renal function daily and adjust dose as necessary.      Please call with any questions.    Thank you,  Juanita Bustillo, PharmD, BCPS, BCCCP  11/22/2024 10:44 AM

## 2024-11-22 NOTE — PROGRESS NOTES
[facility]  INPATIENT OCCUPATIONAL THERAPY  [facility] ICU 4D  EVALUATION      Discharge Recommendations: Continue to assess pending progress  Equipment Recommendations:   monitor pending progress       Time In: 1416  Time Out: 1426  Timed Code Treatment Minutes: 0 Minutes  Minutes: 10          Date: 2024  Patient Name: Priya Lema,   Gender: female      MRN: [id number]  : 1965  (59 y.o.)  Referring Practitioner: Shireen Murillo MD  Diagnosis: Pyelonephritis  Additional Pertinent Hx: Per H & P:59 y.o. female with a history of hypertension, hyperlipidemia, COPD, and chronic respiratory failure with hypoxia who was transferred from an outside facility for sepsis secondary to left-sided pyelonephritis with an associated left-sided nephrolithiasis.  The patient reports she presented to the outside facility initially for some left-sided back/flank pain.  She was not having any associated urinary symptoms at that time.  She does report some subjective fevers and chills.  She was found to have a left-sided nephrolithiasis and was transferred to [facility] for urology evaluation.  At this time, she does report some mild pain.  She is on oxygen at baseline.Rapid response  for decreased mentation, afib 's. Given 1L fld bolus. Transferred to ICU and intubated for airway protection. Taken to surgery today for cystoscopy with left ureteral stent. s/p CYSTOSCOPY LEFT URETERAL STENT INSERTION on . Unable to extubate post-op d/t tongue swelling and no cuff leak. Pt still intubated on eval     Restrictions/Precautions:  Position Activity Restriction  Other position/activity restrictions: monitor BP & HR    Subjective  Chart Reviewed: Yes, Orders, Progress Notes, History and Physical  Patient assessed for rehabilitation services?: Yes  Family / Caregiver Present: No    Subjective: Pt laying in bed with eyes closed upon arrival of therapist. Pt awoken to name & able to give  therapist thumbs up with cueing    Pain: no grimacing noted during session/10:      Vitals: Blood Pressure: prior to session 172/109, after therapist started repositioning Pt 170/113  Oxygen: 94% PEEP 6; Fi O2 40%  Heart Rate: 111-123-therapist asked RN Pt in A-fib    Social/Functional History:                   Active : Yes     Additional Comments: Per CM note, Pt lives alone & was on 2-4L O2.    VISION: appears intact    HEARING:  WFL    COGNITION: Decreased Insight, Decreased Problem Solving, and Decreased Safety Awareness    RANGE OF MOTION:  Observed B shoulder flexion to 45 degrees, distal WFL    STRENGTH:  B  WFL    Hand Dominance: Right    SENSATION:   Appears intact    ADL:   Grooming: Dependent.  For replacing wet wash cloth on Pt's forehead-per request .    Exercise:  B shoulder flexion AAROM x 2 reps. Noted Pt BP elevated & HR sustained elevated-RN in & therapist discussed with RN-decided to discontinue session at this time.     AM-[facility] Inpatient Daily Activity Raw Score: 7  AM-PAC Inpatient ADL T-Scale Score : 20.13  ADL Inpatient CMS 0-100% Score: 92.44    Activity Tolerance:  Patient tolerance of  treatment: Fair treatment tolerance       Modified Maries:  Premorbid Functional Status: Not Applicable  Current Functional Status:  Not Applicable    Assessment:  Assessment: Pt demo decreased ADL & functional mobility indep over PLOF of living alone. Continued OT recommended to faciliate improvements in the above to increase indep & safety within discharge envrironment.  Performance deficits / Impairments: Decreased functional mobility , Decreased ADL status, Decreased cognition, Decreased strength, Decreased safe awareness, Decreased ROM  Prognosis: Fair  REQUIRES OT FOLLOW-UP: Yes  Decision Making: Medium Complexity    Treatment Initiated: No treatment initiated     Patient Education:          Patient Education  Education Given To: Patient  Education Provided: Role of Therapy  Education Method:

## 2024-11-23 ENCOUNTER — APPOINTMENT (OUTPATIENT)
Dept: GENERAL RADIOLOGY | Age: 59
End: 2024-11-23
Attending: STUDENT IN AN ORGANIZED HEALTH CARE EDUCATION/TRAINING PROGRAM
Payer: COMMERCIAL

## 2024-11-23 LAB
ALBUMIN SERPL BCG-MCNC: 3 G/DL (ref 3.5–5.1)
ALP SERPL-CCNC: 83 U/L (ref 38–126)
ALT SERPL W/O P-5'-P-CCNC: 148 U/L (ref 11–66)
ANION GAP SERPL CALC-SCNC: 15 MEQ/L (ref 8–16)
AST SERPL-CCNC: 79 U/L (ref 5–40)
BASOPHILS ABSOLUTE: 0 THOU/MM3 (ref 0–0.1)
BASOPHILS NFR BLD AUTO: 0.2 %
BILIRUB SERPL-MCNC: 0.4 MG/DL (ref 0.3–1.2)
BUN SERPL-MCNC: 68 MG/DL (ref 7–22)
CA-I BLD ISE-SCNC: 1.08 MMOL/L (ref 1.12–1.32)
CALCIUM SERPL-MCNC: 8 MG/DL (ref 8.5–10.5)
CHLORIDE SERPL-SCNC: 106 MEQ/L (ref 98–111)
CO2 SERPL-SCNC: 20 MEQ/L (ref 23–33)
CREAT SERPL-MCNC: 2.4 MG/DL (ref 0.4–1.2)
DEPRECATED MEAN GLUCOSE BLD GHB EST-ACNC: 177 MG/DL (ref 70–126)
DEPRECATED RDW RBC AUTO: 43.9 FL (ref 35–45)
EOSINOPHIL NFR BLD AUTO: 0 %
EOSINOPHILS ABSOLUTE: 0 THOU/MM3 (ref 0–0.4)
ERYTHROCYTE [DISTWIDTH] IN BLOOD BY AUTOMATED COUNT: 15.8 % (ref 11.5–14.5)
FERRITIN SERPL IA-MCNC: 2449 NG/ML (ref 10–291)
FOLATE SERPL-MCNC: 8.6 NG/ML (ref 4.8–24.2)
GFR SERPL CREATININE-BSD FRML MDRD: 22 ML/MIN/1.73M2
GLUCOSE BLD STRIP.AUTO-MCNC: 241 MG/DL (ref 70–108)
GLUCOSE BLD STRIP.AUTO-MCNC: 255 MG/DL (ref 70–108)
GLUCOSE BLD STRIP.AUTO-MCNC: 263 MG/DL (ref 70–108)
GLUCOSE SERPL-MCNC: 236 MG/DL (ref 70–108)
HBA1C MFR BLD HPLC: 7.9 % (ref 4.4–6.4)
HCT VFR BLD AUTO: 32.8 % (ref 37–47)
HEPARIN UNFRACTIONATED: 0.28 U/ML (ref 0.3–0.7)
HEPARIN UNFRACTIONATED: 0.45 U/ML (ref 0.3–0.7)
HEPARIN UNFRACTIONATED: 0.46 U/ML (ref 0.3–0.7)
HGB BLD-MCNC: 10.4 GM/DL (ref 12–16)
HGB RETIC QN AUTO: 23.2 PG (ref 28.2–35.7)
IMM GRANULOCYTES # BLD AUTO: 0.19 THOU/MM3 (ref 0–0.07)
IMM GRANULOCYTES NFR BLD AUTO: 1.6 %
IMM RETICS NFR: 12.6 % (ref 3–15.9)
IRON SATN MFR SERPL: 40 % (ref 20–50)
IRON SERPL-MCNC: 58 UG/DL (ref 50–170)
IRON SERPL-MCNC: 60 UG/DL (ref 50–170)
LYMPHOCYTES ABSOLUTE: 1.1 THOU/MM3 (ref 1–4.8)
LYMPHOCYTES NFR BLD AUTO: 9.8 %
MAGNESIUM SERPL-MCNC: 2.5 MG/DL (ref 1.6–2.4)
MCH RBC QN AUTO: 24.6 PG (ref 26–33)
MCHC RBC AUTO-ENTMCNC: 31.7 GM/DL (ref 32.2–35.5)
MCV RBC AUTO: 77.7 FL (ref 81–99)
MONOCYTES ABSOLUTE: 0.3 THOU/MM3 (ref 0.4–1.3)
MONOCYTES NFR BLD AUTO: 2.9 %
NEUTROPHILS ABSOLUTE: 10 THOU/MM3 (ref 1.8–7.7)
NEUTROPHILS NFR BLD AUTO: 85.5 %
NRBC BLD AUTO-RTO: 0 /100 WBC
PHOSPHATE SERPL-MCNC: 3.7 MG/DL (ref 2.4–4.7)
PLATELET # BLD AUTO: 181 THOU/MM3 (ref 130–400)
PMV BLD AUTO: 11.1 FL (ref 9.4–12.4)
POTASSIUM SERPL-SCNC: 3.9 MEQ/L (ref 3.5–5.2)
PROT SERPL-MCNC: 6 G/DL (ref 6.1–8)
RBC # BLD AUTO: 4.22 MILL/MM3 (ref 4.2–5.4)
RETICS # AUTO: 43 THOU/MM3 (ref 20–115)
RETICS/RBC NFR AUTO: 1 % (ref 0.5–2)
SODIUM SERPL-SCNC: 141 MEQ/L (ref 135–145)
TIBC SERPL-MCNC: 150 UG/DL (ref 171–450)
VIT B12 SERPL-MCNC: 1125 PG/ML (ref 211–911)
WBC # BLD AUTO: 11.7 THOU/MM3 (ref 4.8–10.8)

## 2024-11-23 PROCEDURE — 6360000002 HC RX W HCPCS

## 2024-11-23 PROCEDURE — 82728 ASSAY OF FERRITIN: CPT

## 2024-11-23 PROCEDURE — 2580000003 HC RX 258: Performed by: PHYSICIAN ASSISTANT

## 2024-11-23 PROCEDURE — 2500000003 HC RX 250 WO HCPCS

## 2024-11-23 PROCEDURE — 82330 ASSAY OF CALCIUM: CPT

## 2024-11-23 PROCEDURE — 6370000000 HC RX 637 (ALT 250 FOR IP): Performed by: PHYSICIAN ASSISTANT

## 2024-11-23 PROCEDURE — 6360000002 HC RX W HCPCS: Performed by: INTERNAL MEDICINE

## 2024-11-23 PROCEDURE — 2000000000 HC ICU R&B

## 2024-11-23 PROCEDURE — 85046 RETICYTE/HGB CONCENTRATE: CPT

## 2024-11-23 PROCEDURE — 85025 COMPLETE CBC W/AUTO DIFF WBC: CPT

## 2024-11-23 PROCEDURE — 36415 COLL VENOUS BLD VENIPUNCTURE: CPT

## 2024-11-23 PROCEDURE — 83540 ASSAY OF IRON: CPT

## 2024-11-23 PROCEDURE — 99255 IP/OBS CONSLTJ NEW/EST HI 80: CPT | Performed by: INTERNAL MEDICINE

## 2024-11-23 PROCEDURE — 2580000003 HC RX 258

## 2024-11-23 PROCEDURE — 83550 IRON BINDING TEST: CPT

## 2024-11-23 PROCEDURE — 83735 ASSAY OF MAGNESIUM: CPT

## 2024-11-23 PROCEDURE — 71045 X-RAY EXAM CHEST 1 VIEW: CPT

## 2024-11-23 PROCEDURE — 94003 VENT MGMT INPAT SUBQ DAY: CPT

## 2024-11-23 PROCEDURE — 84100 ASSAY OF PHOSPHORUS: CPT

## 2024-11-23 PROCEDURE — 85520 HEPARIN ASSAY: CPT

## 2024-11-23 PROCEDURE — 6370000000 HC RX 637 (ALT 250 FOR IP)

## 2024-11-23 PROCEDURE — 82746 ASSAY OF FOLIC ACID SERUM: CPT

## 2024-11-23 PROCEDURE — 94640 AIRWAY INHALATION TREATMENT: CPT

## 2024-11-23 PROCEDURE — 2700000000 HC OXYGEN THERAPY PER DAY

## 2024-11-23 PROCEDURE — 94761 N-INVAS EAR/PLS OXIMETRY MLT: CPT

## 2024-11-23 PROCEDURE — 82948 REAGENT STRIP/BLOOD GLUCOSE: CPT

## 2024-11-23 PROCEDURE — 82607 VITAMIN B-12: CPT

## 2024-11-23 PROCEDURE — 36592 COLLECT BLOOD FROM PICC: CPT

## 2024-11-23 PROCEDURE — 80053 COMPREHEN METABOLIC PANEL: CPT

## 2024-11-23 PROCEDURE — 99291 CRITICAL CARE FIRST HOUR: CPT | Performed by: INTERNAL MEDICINE

## 2024-11-23 PROCEDURE — 83036 HEMOGLOBIN GLYCOSYLATED A1C: CPT

## 2024-11-23 RX ORDER — FENTANYL CITRATE 50 UG/ML
25 INJECTION, SOLUTION INTRAMUSCULAR; INTRAVENOUS
Status: DISCONTINUED | OUTPATIENT
Start: 2024-11-23 | End: 2024-11-23

## 2024-11-23 RX ORDER — PANTOPRAZOLE SODIUM 40 MG/10ML
40 INJECTION, POWDER, LYOPHILIZED, FOR SOLUTION INTRAVENOUS DAILY
Status: DISCONTINUED | OUTPATIENT
Start: 2024-11-23 | End: 2024-11-27

## 2024-11-23 RX ORDER — MIDAZOLAM HYDROCHLORIDE 1 MG/ML
1-10 INJECTION, SOLUTION INTRAVENOUS CONTINUOUS
Status: DISCONTINUED | OUTPATIENT
Start: 2024-11-23 | End: 2024-11-26

## 2024-11-23 RX ORDER — HYDRALAZINE HYDROCHLORIDE 20 MG/ML
10 INJECTION INTRAMUSCULAR; INTRAVENOUS ONCE
Status: COMPLETED | OUTPATIENT
Start: 2024-11-23 | End: 2024-11-23

## 2024-11-23 RX ORDER — INSULIN LISPRO 100 [IU]/ML
0-8 INJECTION, SOLUTION INTRAVENOUS; SUBCUTANEOUS
Status: DISCONTINUED | OUTPATIENT
Start: 2024-11-23 | End: 2024-11-23

## 2024-11-23 RX ORDER — INSULIN LISPRO 100 [IU]/ML
0-16 INJECTION, SOLUTION INTRAVENOUS; SUBCUTANEOUS
Status: DISCONTINUED | OUTPATIENT
Start: 2024-11-23 | End: 2024-11-30

## 2024-11-23 RX ORDER — ALBUTEROL SULFATE 0.83 MG/ML
2.5 SOLUTION RESPIRATORY (INHALATION) EVERY 6 HOURS PRN
Status: DISCONTINUED | OUTPATIENT
Start: 2024-11-23 | End: 2024-11-23 | Stop reason: SDUPTHER

## 2024-11-23 RX ORDER — HYDROXYZINE HYDROCHLORIDE 10 MG/1
10 TABLET, FILM COATED ORAL 3 TIMES DAILY
Status: DISCONTINUED | OUTPATIENT
Start: 2024-11-23 | End: 2024-11-24

## 2024-11-23 RX ORDER — MIDAZOLAM HYDROCHLORIDE 1 MG/ML
2 INJECTION, SOLUTION INTRAMUSCULAR; INTRAVENOUS ONCE
Status: COMPLETED | OUTPATIENT
Start: 2024-11-23 | End: 2024-11-23

## 2024-11-23 RX ORDER — INSULIN GLARGINE 100 [IU]/ML
12 INJECTION, SOLUTION SUBCUTANEOUS DAILY
Status: DISCONTINUED | OUTPATIENT
Start: 2024-11-23 | End: 2024-11-24

## 2024-11-23 RX ORDER — FENTANYL CITRATE-0.9 % NACL/PF 10 MCG/ML
25-200 PLASTIC BAG, INJECTION (ML) INTRAVENOUS CONTINUOUS
Status: DISCONTINUED | OUTPATIENT
Start: 2024-11-23 | End: 2024-11-26

## 2024-11-23 RX ORDER — MIDAZOLAM HYDROCHLORIDE 1 MG/ML
INJECTION, SOLUTION INTRAMUSCULAR; INTRAVENOUS
Status: COMPLETED
Start: 2024-11-23 | End: 2024-11-23

## 2024-11-23 RX ADMIN — HYDRALAZINE HYDROCHLORIDE 10 MG: 20 INJECTION INTRAMUSCULAR; INTRAVENOUS at 02:17

## 2024-11-23 RX ADMIN — Medication 2 MG/HR: at 09:57

## 2024-11-23 RX ADMIN — MIDAZOLAM HYDROCHLORIDE 2 MG: 1 INJECTION, SOLUTION INTRAMUSCULAR; INTRAVENOUS at 02:54

## 2024-11-23 RX ADMIN — HEPARIN SODIUM 16 UNITS/KG/HR: 10000 INJECTION, SOLUTION INTRAVENOUS at 07:34

## 2024-11-23 RX ADMIN — DEXAMETHASONE SODIUM PHOSPHATE 4 MG: 4 INJECTION, SOLUTION INTRA-ARTICULAR; INTRALESIONAL; INTRAMUSCULAR; INTRAVENOUS; SOFT TISSUE at 00:20

## 2024-11-23 RX ADMIN — CEFEPIME 1000 MG: 1 INJECTION, POWDER, FOR SOLUTION INTRAMUSCULAR; INTRAVENOUS at 23:32

## 2024-11-23 RX ADMIN — SODIUM CHLORIDE, PRESERVATIVE FREE 10 ML: 5 INJECTION INTRAVENOUS at 10:24

## 2024-11-23 RX ADMIN — PANTOPRAZOLE SODIUM 40 MG: 40 INJECTION, POWDER, FOR SOLUTION INTRAVENOUS at 18:04

## 2024-11-23 RX ADMIN — INSULIN LISPRO 8 UNITS: 100 INJECTION, SOLUTION INTRAVENOUS; SUBCUTANEOUS at 14:27

## 2024-11-23 RX ADMIN — CEFEPIME 1000 MG: 1 INJECTION, POWDER, FOR SOLUTION INTRAMUSCULAR; INTRAVENOUS at 12:02

## 2024-11-23 RX ADMIN — INSULIN LISPRO 8 UNITS: 100 INJECTION, SOLUTION INTRAVENOUS; SUBCUTANEOUS at 19:59

## 2024-11-23 RX ADMIN — Medication 75 MCG/HR: at 22:18

## 2024-11-23 RX ADMIN — FENTANYL CITRATE 50 MCG: 50 INJECTION, SOLUTION INTRAMUSCULAR; INTRAVENOUS at 04:40

## 2024-11-23 RX ADMIN — HEPARIN SODIUM 2000 UNITS: 1000 INJECTION INTRAVENOUS; SUBCUTANEOUS at 06:27

## 2024-11-23 RX ADMIN — DEXAMETHASONE SODIUM PHOSPHATE 4 MG: 4 INJECTION, SOLUTION INTRA-ARTICULAR; INTRALESIONAL; INTRAMUSCULAR; INTRAVENOUS; SOFT TISSUE at 23:32

## 2024-11-23 RX ADMIN — DEXAMETHASONE SODIUM PHOSPHATE 4 MG: 4 INJECTION, SOLUTION INTRA-ARTICULAR; INTRALESIONAL; INTRAMUSCULAR; INTRAVENOUS; SOFT TISSUE at 16:03

## 2024-11-23 RX ADMIN — BUDESONIDE 250 MCG: 0.25 INHALANT RESPIRATORY (INHALATION) at 20:33

## 2024-11-23 RX ADMIN — HYDROXYZINE HYDROCHLORIDE 10 MG: 10 TABLET ORAL at 17:00

## 2024-11-23 RX ADMIN — INSULIN LISPRO 2 UNITS: 100 INJECTION, SOLUTION INTRAVENOUS; SUBCUTANEOUS at 10:23

## 2024-11-23 RX ADMIN — BUDESONIDE 250 MCG: 0.25 INHALANT RESPIRATORY (INHALATION) at 10:14

## 2024-11-23 RX ADMIN — INSULIN GLARGINE 12 UNITS: 100 INJECTION, SOLUTION SUBCUTANEOUS at 10:23

## 2024-11-23 RX ADMIN — DEXAMETHASONE SODIUM PHOSPHATE 4 MG: 4 INJECTION, SOLUTION INTRA-ARTICULAR; INTRALESIONAL; INTRAMUSCULAR; INTRAVENOUS; SOFT TISSUE at 06:29

## 2024-11-23 RX ADMIN — POLYETHYLENE GLYCOL 3350 17 G: 17 POWDER, FOR SOLUTION ORAL at 16:03

## 2024-11-23 RX ADMIN — FENTANYL CITRATE 50 MCG: 50 INJECTION, SOLUTION INTRAMUSCULAR; INTRAVENOUS at 06:44

## 2024-11-23 RX ADMIN — TIOTROPIUM BROMIDE AND OLODATEROL 2 PUFF: 3.124; 2.736 SPRAY, METERED RESPIRATORY (INHALATION) at 10:14

## 2024-11-23 RX ADMIN — AMIODARONE HYDROCHLORIDE 0.5 MG/MIN: 1.8 INJECTION, SOLUTION INTRAVENOUS at 11:23

## 2024-11-23 RX ADMIN — DOXYCYCLINE 100 MG: 100 INJECTION, POWDER, LYOPHILIZED, FOR SOLUTION INTRAVENOUS at 03:25

## 2024-11-23 RX ADMIN — AMIODARONE HYDROCHLORIDE 0.5 MG/MIN: 1.8 INJECTION, SOLUTION INTRAVENOUS at 23:29

## 2024-11-23 RX ADMIN — FENTANYL CITRATE 50 MCG: 50 INJECTION, SOLUTION INTRAMUSCULAR; INTRAVENOUS at 01:35

## 2024-11-23 RX ADMIN — Medication 50 MCG/HR: at 09:54

## 2024-11-23 RX ADMIN — SODIUM CHLORIDE, PRESERVATIVE FREE 10 ML: 5 INJECTION INTRAVENOUS at 19:52

## 2024-11-23 RX ADMIN — DEXAMETHASONE SODIUM PHOSPHATE 4 MG: 4 INJECTION, SOLUTION INTRA-ARTICULAR; INTRALESIONAL; INTRAMUSCULAR; INTRAVENOUS; SOFT TISSUE at 11:58

## 2024-11-23 RX ADMIN — ALBUTEROL SULFATE 2.5 MG: 2.5 SOLUTION RESPIRATORY (INHALATION) at 10:15

## 2024-11-23 RX ADMIN — DOXYCYCLINE 100 MG: 100 INJECTION, POWDER, LYOPHILIZED, FOR SOLUTION INTRAVENOUS at 14:18

## 2024-11-23 RX ADMIN — HYDROXYZINE HYDROCHLORIDE 10 MG: 10 TABLET ORAL at 19:52

## 2024-11-23 RX ADMIN — ALBUTEROL SULFATE 2.5 MG: 2.5 SOLUTION RESPIRATORY (INHALATION) at 00:28

## 2024-11-23 RX ADMIN — SODIUM CHLORIDE: 9 INJECTION, SOLUTION INTRAVENOUS at 23:31

## 2024-11-23 RX ADMIN — ATORVASTATIN CALCIUM 20 MG: 20 TABLET, FILM COATED ORAL at 19:52

## 2024-11-23 RX ADMIN — MIDAZOLAM 2 MG: 1 INJECTION INTRAMUSCULAR; INTRAVENOUS at 02:54

## 2024-11-23 ASSESSMENT — PAIN SCALES - GENERAL: PAINLEVEL_OUTOF10: 0

## 2024-11-23 ASSESSMENT — PULMONARY FUNCTION TESTS
PIF_VALUE: 27
PIF_VALUE: 20
PIF_VALUE: 28
PIF_VALUE: 19
PIF_VALUE: 20
PIF_VALUE: 23
PIF_VALUE: 28

## 2024-11-23 NOTE — PROGRESS NOTES
--  148*   BILITOT 0.3 0.4  --  0.4   ALKPHOS 84 96  --  83   LIPASE  --   --  146.4*  --      Cardiac Enzymes: No results for input(s): \"CKTOTAL\", \"CKMB\", \"TROPONINI\" in the last 72 hours.  BNP: No results for input(s): \"BNP\" in the last 72 hours.  INR:   Recent Labs     11/22/24  0815   INR 1.22*     POC   Recent Labs     11/22/24  2133 11/23/24  1019 11/23/24  1411   POCGLU 189* 241* 255*     Recent Labs     11/21/24  0009   LACTA 1.3        midazolam 3 mg/hr (11/23/24 1300)    fentaNYL 75 mcg/hr (11/23/24 1300)    heparin (PORCINE) Infusion 16 Units/kg/hr (11/23/24 1300)    amiodarone 0.5 mg/min (11/23/24 1300)    sodium chloride 20 mL/hr at 11/22/24 0621    dextrose          insulin glargine  12 Units SubCUTAneous Daily    insulin lispro  0-16 Units SubCUTAneous 4x Daily AC & HS    hydrOXYzine HCl  10 mg Oral TID    dexAMETHasone  4 mg IntraVENous Q6H    cefepime  1,000 mg IntraVENous Q12H    doxycycline (VIBRAMYCIN) IV  100 mg IntraVENous Q12H    budesonide  250 mcg Nebulization BID RT    [Held by provider] hydrocortisone sodium succinate PF  100 mg IntraVENous Q8H    sodium chloride flush  5-40 mL IntraVENous 2 times per day    atorvastatin  20 mg Oral Nightly    [Held by provider] lisinopril  40 mg Oral Daily    metoprolol succinate  50 mg Oral Daily    tiotropium-olodaterol  2 puff Inhalation Daily       24HR INTAKE/OUTPUT:    Intake/Output Summary (Last 24 hours) at 11/23/2024 1648  Last data filed at 11/23/2024 1400  Gross per 24 hour   Intake 1877.06 ml   Output 1675 ml   Net 202.06 ml       Chest x-ray performed on: 11/23/24  IMPRESSION:  1. Improved aeration throughout the bilateral lungs. Mild residual   interstitial thickening and interstitial opacities greatest within the   retrocardiac region and within the right lung base.      PUD and DVT prophylaxis reviewed.  Heparin drip  Will start patient on Protonix 40 mg IV daily for PUD prophylaxis    Meets Continued ICU Level Care Criteria:    [x] Yes      Patient seen by me including key components of medical care.  Case discussed with resident physician.  Patient persistently critically ill.    Italicized font, if present, represents changes to the note made by me.    Critical Care time: 37 minutes.  Time was discontiguous. Time does not include procedure. Time does include my direct assessment of the patient and coordination of care.  Time represents more than 50% of the time involved with patient care by the CC team.      Electronically signed by   Maggie Carlos MD on 11/23/2024 at 4:48 PM

## 2024-11-23 NOTE — PLAN OF CARE
Problem: Respiratory - Adult  Goal: Achieves optimal ventilation and oxygenation  Outcome: Progressing                                                Patient Weaning Progress    The patient's vent settings was able to be weaned this shift.      Ventilator settings that were weaned              [x] Mode   [x] Pressure support weaned   [] Fio2 weaned   [] Peep weaned      Spontaneous weaning trial  was attempted.     due to defined parameters for SBT (spontaneous breathing trial) not being met.     *Results of SBT documented under SBTOUTCOME note.    Reason that defined ventilator parameters for SBT was not met              [] Patient condition requires increased ventilator settings  [] Requires increased sedation   [] Settings not within weaning range   [] SAT not completed   [] Physician orders    The patient was able to tolerate SBT. Spontanteous VT was 352 and RR 17 breaths/min.  The patient was on SBT for 20 minutes         Cuff was  deflated to determine cuff leak. A leak  was not detected.     Unable to get agreement for goals because no family is present and patient cannot respond.

## 2024-11-23 NOTE — PLAN OF CARE
Problem: Discharge Planning  Goal: Discharge to home or other facility with appropriate resources  Outcome: Progressing  Flowsheets (Taken 11/23/2024 0800)  Discharge to home or other facility with appropriate resources:   Identify barriers to discharge with patient and caregiver   Identify discharge learning needs (meds, wound care, etc)   Arrange for needed discharge resources and transportation as appropriate     Problem: Pain  Goal: Verbalizes/displays adequate comfort level or baseline comfort level  Outcome: Progressing  Flowsheets (Taken 11/23/2024 0800)  Verbalizes/displays adequate comfort level or baseline comfort level:   Encourage patient to monitor pain and request assistance   Assess pain using appropriate pain scale   Administer analgesics based on type and severity of pain and evaluate response   Implement non-pharmacological measures as appropriate and evaluate response   Consider cultural and social influences on pain and pain management   Notify Licensed Independent Practitioner if interventions unsuccessful or patient reports new pain     Problem: Neurosensory - Adult  Goal: Achieves stable or improved neurological status  Outcome: Progressing  Flowsheets (Taken 11/23/2024 0800)  Achieves stable or improved neurological status:   Assess for and report changes in neurological status   Initiate measures to prevent increased intracranial pressure   Maintain blood pressure and fluid volume within ordered parameters to optimize cerebral perfusion and minimize risk of hemorrhage   Monitor temperature, glucose, and sodium. Initiate appropriate interventions as ordered  Goal: Achieves maximal functionality and self care  Outcome: Progressing  Flowsheets (Taken 11/23/2024 0800)  Achieves maximal functionality and self care: Encourage and assist patient to increase activity and self care with guidance from physical therapy/occupational therapy     Problem: Respiratory - Adult  Goal: Achieves optimal

## 2024-11-23 NOTE — CONSULTS
[facility]  Cardiology Consult      Patient:  Priya Lema  YOB: 1965    MRN: [id number]   Acct: [id number]     Primary Care Physician: Parmjit Tesfaye MD    REASON FOR CONSULT:   New drop in EF, ? Ischemic work up, elevated troponin, new atrial fibrillation      CHIEF COMPLAINT:    Flank pain    HISTORY OF PRESENT ILLNESS:    Priya Lema is a 59 year old female patient with past medical history that includes hypertension, COPD, tobacco abuse, obesity. She has h/o chronic respiratory failure and I s on home oxygen. Her family history is positive for CHF in her mother. Patient is intubated and is unable to provide history. Her family at bedside states that she was referred to a cardiologist last year for swelling in her legs. Echocardiogram on 12/2023 revealed an EF of 50-55%.The patient was admitted to the hospital on 11/20/2024. She had ;eft flank pain and was found to have RAINE. Patient developed worsening hypoxia and is now s/p intubation, being treated for COPD exacerbation. The patient is also being treated for UTI, Pyelonephritis, kidney stones and sepsis. Gram negative bacillin were noted on urine culture. She is followed by Urology and is now s/p left ureteral stent on 11/21/2024. She was noted to have new drop in EF on Echocardiogram with new onset atrial fibrillation and elevated troponin, cardiology consult was requested for further management and possible ischemic evaluation. Echocardiogram on 11/21/2024 revealed an EF of 30-35%.      All labs, EKG's, diagnostic testing and images as well as cardiac cath, stress testing   were reviewed during this encounter    CARDIAC TESTING    ECHO 2023  SUMMARY:   1. Left ventricle: The cavity size was normal. There was moderate      asymmetric hypertrophy of the septum. Systolic function was at      the lower limits of normal. The estimated ejection fraction was      in the range of 50% to 55%. Wall motion was

## 2024-11-24 LAB
ALBUMIN SERPL BCG-MCNC: 3.1 G/DL (ref 3.5–5.1)
ALP SERPL-CCNC: 79 U/L (ref 38–126)
ALT SERPL W/O P-5'-P-CCNC: 107 U/L (ref 11–66)
ANION GAP SERPL CALC-SCNC: 14 MEQ/L (ref 8–16)
AST SERPL-CCNC: 33 U/L (ref 5–40)
BASOPHILS ABSOLUTE: 0 THOU/MM3 (ref 0–0.1)
BASOPHILS NFR BLD AUTO: 0.2 %
BILIRUB SERPL-MCNC: 0.4 MG/DL (ref 0.3–1.2)
BUN SERPL-MCNC: 69 MG/DL (ref 7–22)
CA-I BLD ISE-SCNC: 1.13 MMOL/L (ref 1.12–1.32)
CALCIUM SERPL-MCNC: 8 MG/DL (ref 8.5–10.5)
CHLORIDE SERPL-SCNC: 109 MEQ/L (ref 98–111)
CO2 SERPL-SCNC: 21 MEQ/L (ref 23–33)
CREAT SERPL-MCNC: 2 MG/DL (ref 0.4–1.2)
DEPRECATED RDW RBC AUTO: 44.4 FL (ref 35–45)
EOSINOPHIL NFR BLD AUTO: 0.2 %
EOSINOPHILS ABSOLUTE: 0 THOU/MM3 (ref 0–0.4)
ERYTHROCYTE [DISTWIDTH] IN BLOOD BY AUTOMATED COUNT: 15.9 % (ref 11.5–14.5)
GFR SERPL CREATININE-BSD FRML MDRD: 28 ML/MIN/1.73M2
GLUCOSE BLD STRIP.AUTO-MCNC: 225 MG/DL (ref 70–108)
GLUCOSE BLD STRIP.AUTO-MCNC: 265 MG/DL (ref 70–108)
GLUCOSE BLD STRIP.AUTO-MCNC: 290 MG/DL (ref 70–108)
GLUCOSE BLD STRIP.AUTO-MCNC: 320 MG/DL (ref 70–108)
GLUCOSE SERPL-MCNC: 268 MG/DL (ref 70–108)
HCT VFR BLD AUTO: 31.1 % (ref 37–47)
HEPARIN UNFRACTIONATED: 0.6 U/ML (ref 0.3–0.7)
HGB BLD-MCNC: 10 GM/DL (ref 12–16)
IMM GRANULOCYTES # BLD AUTO: 0.75 THOU/MM3 (ref 0–0.07)
IMM GRANULOCYTES NFR BLD AUTO: 5.7 %
LYMPHOCYTES ABSOLUTE: 0.9 THOU/MM3 (ref 1–4.8)
LYMPHOCYTES NFR BLD AUTO: 6.7 %
MAGNESIUM SERPL-MCNC: 2.5 MG/DL (ref 1.6–2.4)
MCH RBC QN AUTO: 24.8 PG (ref 26–33)
MCHC RBC AUTO-ENTMCNC: 32.2 GM/DL (ref 32.2–35.5)
MCV RBC AUTO: 77 FL (ref 81–99)
MONOCYTES ABSOLUTE: 0.6 THOU/MM3 (ref 0.4–1.3)
MONOCYTES NFR BLD AUTO: 4.9 %
NEUTROPHILS ABSOLUTE: 10.8 THOU/MM3 (ref 1.8–7.7)
NEUTROPHILS NFR BLD AUTO: 82.3 %
NRBC BLD AUTO-RTO: 0 /100 WBC
PHOSPHATE SERPL-MCNC: 2.8 MG/DL (ref 2.4–4.7)
PLATELET # BLD AUTO: 202 THOU/MM3 (ref 130–400)
PMV BLD AUTO: 11.5 FL (ref 9.4–12.4)
POTASSIUM SERPL-SCNC: 3.8 MEQ/L (ref 3.5–5.2)
PROT SERPL-MCNC: 5.7 G/DL (ref 6.1–8)
RBC # BLD AUTO: 4.04 MILL/MM3 (ref 4.2–5.4)
SCAN OF BLOOD SMEAR: NORMAL
SODIUM SERPL-SCNC: 144 MEQ/L (ref 135–145)
WBC # BLD AUTO: 13.1 THOU/MM3 (ref 4.8–10.8)

## 2024-11-24 PROCEDURE — 6360000002 HC RX W HCPCS

## 2024-11-24 PROCEDURE — 2500000003 HC RX 250 WO HCPCS

## 2024-11-24 PROCEDURE — 94640 AIRWAY INHALATION TREATMENT: CPT

## 2024-11-24 PROCEDURE — 6370000000 HC RX 637 (ALT 250 FOR IP): Performed by: PHYSICIAN ASSISTANT

## 2024-11-24 PROCEDURE — 2700000000 HC OXYGEN THERAPY PER DAY

## 2024-11-24 PROCEDURE — 84100 ASSAY OF PHOSPHORUS: CPT

## 2024-11-24 PROCEDURE — 2580000003 HC RX 258

## 2024-11-24 PROCEDURE — 82948 REAGENT STRIP/BLOOD GLUCOSE: CPT

## 2024-11-24 PROCEDURE — 80053 COMPREHEN METABOLIC PANEL: CPT

## 2024-11-24 PROCEDURE — 6370000000 HC RX 637 (ALT 250 FOR IP)

## 2024-11-24 PROCEDURE — 82330 ASSAY OF CALCIUM: CPT

## 2024-11-24 PROCEDURE — 36592 COLLECT BLOOD FROM PICC: CPT

## 2024-11-24 PROCEDURE — 6370000000 HC RX 637 (ALT 250 FOR IP): Performed by: INTERNAL MEDICINE

## 2024-11-24 PROCEDURE — 85520 HEPARIN ASSAY: CPT

## 2024-11-24 PROCEDURE — 94761 N-INVAS EAR/PLS OXIMETRY MLT: CPT

## 2024-11-24 PROCEDURE — 36415 COLL VENOUS BLD VENIPUNCTURE: CPT

## 2024-11-24 PROCEDURE — 6360000002 HC RX W HCPCS: Performed by: INTERNAL MEDICINE

## 2024-11-24 PROCEDURE — 85025 COMPLETE CBC W/AUTO DIFF WBC: CPT

## 2024-11-24 PROCEDURE — 83735 ASSAY OF MAGNESIUM: CPT

## 2024-11-24 PROCEDURE — 2580000003 HC RX 258: Performed by: PHYSICIAN ASSISTANT

## 2024-11-24 PROCEDURE — 94003 VENT MGMT INPAT SUBQ DAY: CPT

## 2024-11-24 PROCEDURE — 99291 CRITICAL CARE FIRST HOUR: CPT | Performed by: INTERNAL MEDICINE

## 2024-11-24 PROCEDURE — 2000000000 HC ICU R&B

## 2024-11-24 RX ORDER — HYDRALAZINE HYDROCHLORIDE 20 MG/ML
10 INJECTION INTRAMUSCULAR; INTRAVENOUS EVERY 6 HOURS PRN
Status: DISCONTINUED | OUTPATIENT
Start: 2024-11-24 | End: 2024-11-26

## 2024-11-24 RX ORDER — DILTIAZEM HYDROCHLORIDE 5 MG/ML
10 INJECTION INTRAVENOUS ONCE
Status: DISCONTINUED | OUTPATIENT
Start: 2024-11-24 | End: 2024-11-24

## 2024-11-24 RX ORDER — METOPROLOL SUCCINATE 100 MG/1
100 TABLET, EXTENDED RELEASE ORAL DAILY
Status: DISCONTINUED | OUTPATIENT
Start: 2024-11-24 | End: 2024-11-24

## 2024-11-24 RX ORDER — HYDROXYZINE HYDROCHLORIDE 10 MG/1
10 TABLET, FILM COATED ORAL 3 TIMES DAILY PRN
Status: DISCONTINUED | OUTPATIENT
Start: 2024-11-24 | End: 2024-12-06 | Stop reason: HOSPADM

## 2024-11-24 RX ORDER — INSULIN GLARGINE 100 [IU]/ML
15 INJECTION, SOLUTION SUBCUTANEOUS DAILY
Status: DISCONTINUED | OUTPATIENT
Start: 2024-11-25 | End: 2024-11-25

## 2024-11-24 RX ORDER — DEXAMETHASONE SODIUM PHOSPHATE 4 MG/ML
6 INJECTION, SOLUTION INTRA-ARTICULAR; INTRALESIONAL; INTRAMUSCULAR; INTRAVENOUS; SOFT TISSUE ONCE
Status: COMPLETED | OUTPATIENT
Start: 2024-11-24 | End: 2024-11-24

## 2024-11-24 RX ORDER — METOPROLOL SUCCINATE 50 MG/1
50 TABLET, EXTENDED RELEASE ORAL 2 TIMES DAILY
Status: DISCONTINUED | OUTPATIENT
Start: 2024-11-24 | End: 2024-11-24

## 2024-11-24 RX ORDER — METOPROLOL TARTRATE 50 MG
50 TABLET ORAL 2 TIMES DAILY
Status: DISCONTINUED | OUTPATIENT
Start: 2024-11-24 | End: 2024-11-27

## 2024-11-24 RX ADMIN — HYDROXYZINE HYDROCHLORIDE 10 MG: 10 TABLET ORAL at 23:04

## 2024-11-24 RX ADMIN — CEFEPIME 2000 MG: 2 INJECTION, POWDER, FOR SOLUTION INTRAVENOUS at 20:01

## 2024-11-24 RX ADMIN — INSULIN LISPRO 4 UNITS: 100 INJECTION, SOLUTION INTRAVENOUS; SUBCUTANEOUS at 17:02

## 2024-11-24 RX ADMIN — DOXYCYCLINE 100 MG: 100 INJECTION, POWDER, LYOPHILIZED, FOR SOLUTION INTRAVENOUS at 13:53

## 2024-11-24 RX ADMIN — BUDESONIDE 250 MCG: 0.25 INHALANT RESPIRATORY (INHALATION) at 07:50

## 2024-11-24 RX ADMIN — POLYETHYLENE GLYCOL 3350 17 G: 17 POWDER, FOR SOLUTION ORAL at 16:58

## 2024-11-24 RX ADMIN — METOPROLOL TARTRATE 50 MG: 50 TABLET, FILM COATED ORAL at 10:03

## 2024-11-24 RX ADMIN — CEFEPIME 1000 MG: 1 INJECTION, POWDER, FOR SOLUTION INTRAMUSCULAR; INTRAVENOUS at 10:05

## 2024-11-24 RX ADMIN — DEXTROSE MONOHYDRATE 2.5 MG/HR: 50 INJECTION, SOLUTION INTRAVENOUS at 12:13

## 2024-11-24 RX ADMIN — ATORVASTATIN CALCIUM 20 MG: 20 TABLET, FILM COATED ORAL at 20:02

## 2024-11-24 RX ADMIN — Medication 75 MCG/HR: at 13:59

## 2024-11-24 RX ADMIN — INSULIN LISPRO 8 UNITS: 100 INJECTION, SOLUTION INTRAVENOUS; SUBCUTANEOUS at 12:14

## 2024-11-24 RX ADMIN — SODIUM CHLORIDE, PRESERVATIVE FREE 10 ML: 5 INJECTION INTRAVENOUS at 20:02

## 2024-11-24 RX ADMIN — HYDROXYZINE HYDROCHLORIDE 10 MG: 10 TABLET ORAL at 08:46

## 2024-11-24 RX ADMIN — HEPARIN SODIUM 16 UNITS/KG/HR: 10000 INJECTION, SOLUTION INTRAVENOUS at 00:34

## 2024-11-24 RX ADMIN — BUDESONIDE 250 MCG: 0.25 INHALANT RESPIRATORY (INHALATION) at 20:45

## 2024-11-24 RX ADMIN — PANTOPRAZOLE SODIUM 40 MG: 40 INJECTION, POWDER, FOR SOLUTION INTRAVENOUS at 08:46

## 2024-11-24 RX ADMIN — INSULIN LISPRO 12 UNITS: 100 INJECTION, SOLUTION INTRAVENOUS; SUBCUTANEOUS at 20:06

## 2024-11-24 RX ADMIN — HYDRALAZINE HYDROCHLORIDE 10 MG: 20 INJECTION INTRAMUSCULAR; INTRAVENOUS at 04:43

## 2024-11-24 RX ADMIN — TIOTROPIUM BROMIDE AND OLODATEROL 2 PUFF: 3.124; 2.736 SPRAY, METERED RESPIRATORY (INHALATION) at 08:16

## 2024-11-24 RX ADMIN — HYDRALAZINE HYDROCHLORIDE 10 MG: 20 INJECTION INTRAMUSCULAR; INTRAVENOUS at 23:04

## 2024-11-24 RX ADMIN — ALBUTEROL SULFATE 2.5 MG: 2.5 SOLUTION RESPIRATORY (INHALATION) at 07:50

## 2024-11-24 RX ADMIN — INSULIN GLARGINE 12 UNITS: 100 INJECTION, SOLUTION SUBCUTANEOUS at 09:07

## 2024-11-24 RX ADMIN — ALBUTEROL SULFATE 2.5 MG: 2.5 SOLUTION RESPIRATORY (INHALATION) at 15:26

## 2024-11-24 RX ADMIN — DOXYCYCLINE 100 MG: 100 INJECTION, POWDER, LYOPHILIZED, FOR SOLUTION INTRAVENOUS at 03:13

## 2024-11-24 RX ADMIN — HEPARIN SODIUM 16 UNITS/KG/HR: 10000 INJECTION, SOLUTION INTRAVENOUS at 17:08

## 2024-11-24 RX ADMIN — DOCUSATE SODIUM 100 MG: 50 LIQUID ORAL at 17:02

## 2024-11-24 RX ADMIN — DEXAMETHASONE SODIUM PHOSPHATE 6 MG: 4 INJECTION, SOLUTION INTRA-ARTICULAR; INTRALESIONAL; INTRAMUSCULAR; INTRAVENOUS; SOFT TISSUE at 08:46

## 2024-11-24 RX ADMIN — SODIUM CHLORIDE, PRESERVATIVE FREE 10 ML: 5 INJECTION INTRAVENOUS at 08:46

## 2024-11-24 RX ADMIN — INSULIN LISPRO 8 UNITS: 100 INJECTION, SOLUTION INTRAVENOUS; SUBCUTANEOUS at 07:05

## 2024-11-24 ASSESSMENT — PULMONARY FUNCTION TESTS
PIF_VALUE: 18
PIF_VALUE: 18
PIF_VALUE: 16

## 2024-11-24 NOTE — PLAN OF CARE
Problem: Genitourinary - Adult  Goal: Absence of urinary retention  Outcome: Not Progressing  Flowsheets  Taken 11/24/2024 0745 by Evelin Glaser RN  Absence of urinary retention: Assess patient’s ability to void and empty bladder  Taken 11/23/2024 1947 by Yue Boucher RN  Absence of urinary retention: Monitor intake/output and perform bladder scan as needed  Goal: Urinary catheter remains patent  Outcome: Not Progressing  Flowsheets  Taken 11/24/2024 0745 by Evelin Glaser RN  Urinary catheter remains patent: Assess patency of urinary catheter  Taken 11/23/2024 1947 by Yue Boucher RN  Urinary catheter remains patent: Assess patency of urinary catheter     Problem: Infection - Adult  Goal: Absence of infection at discharge  Outcome: Not Progressing  Flowsheets  Taken 11/24/2024 0745 by Evelin Glaser RN  Absence of infection at discharge:   Assess and monitor for signs and symptoms of infection   Monitor lab/diagnostic results  Taken 11/23/2024 1947 by Yue Boucher RN  Absence of infection at discharge:   Assess and monitor for signs and symptoms of infection   Monitor lab/diagnostic results  Goal: Absence of infection during hospitalization  Outcome: Not Progressing  Flowsheets  Taken 11/24/2024 0745 by Evelin Glaser RN  Absence of infection during hospitalization: Assess and monitor for signs and symptoms of infection  Taken 11/23/2024 1947 by Yue Boucher RN  Absence of infection during hospitalization: Assess and monitor for signs and symptoms of infection     Problem: Metabolic/Fluid and Electrolytes - Adult  Goal: Electrolytes maintained within normal limits  Outcome: Not Progressing  Flowsheets (Taken 11/23/2024 1947 by Yue Boucher RN)  Electrolytes maintained within normal limits:   Monitor labs and assess patient for signs and symptoms of electrolyte imbalances   Administer electrolyte replacement as ordered  Goal: Hemodynamic stability and optimal renal function

## 2024-11-24 NOTE — PLAN OF CARE
Problem: Respiratory - Adult  Goal: Achieves optimal ventilation and oxygenation  Outcome: Progressing                                               Patient Weaning Progress    The patient's vent settings was able to be weaned this shift.      Ventilator settings that were weaned              [] Mode   [x] Pressure support weaned   [x] Fio2 weaned   [] Peep weaned      Spontaneous weaning trial  was not attempted.     due to defined parameters for SBT (spontaneous breathing trial) not being met.     *Results of SBT documented under SBTOUTCOME note.    Reason that defined ventilator parameters for SBT was not met              [] Patient condition requires increased ventilator settings  [x] Requires increased sedation   [] Settings not within weaning range   [] SAT not completed   [] Physician orders        Cuff was  deflated to determine cuff leak. A leak  was not detected.     Unable to get agreement for goals because no family is present and patient cannot respond.

## 2024-11-24 NOTE — PROGRESS NOTES
Pharmacy Note - Extended Infusion Beta-Lactam Dose Adjustment    Cefepime 1000 mg q12h extended infusion for treatment of Community acquired pneumonia. Per [facility] Extended Infusion Beta-Lactam Policy, cefepime will be changed to   2000 mg q12h extended infusion    Estimated Creatinine Clearance: Estimated Creatinine Clearance: 32 mL/min (A) (based on SCr of 2 mg/dL (H)).    Dialysis Status, RAINE, CKD:  sCr trending down    BMI: Body mass index is 42.75 kg/m².    Rationale for Adjustment: Dose adjusted per [facility] Extended Infusion Policy based on renal function and indication. The above medication is renally eliminated and demonstrates time-dependent effects on bacterial eradication. Extended-infusion dosing strategy aims to enhance microbiologic and clinical efficacy.     Pharmacy will monitor renal function daily and adjust dose as necessary.      Please call with any questions.    Thank you,  Yennifer Nix PharmD 11/24/2024 10:55 AM

## 2024-11-24 NOTE — PROGRESS NOTES
Patient Weaning Progress    The patient's vent settings was able to be weaned this shift.      Ventilator settings that were weaned              [x] Mode   [] Pressure support weaned   [x] Fio2 weaned   [x] Peep weaned      Spontaneous weaning trial  was attempted.     due to defined parameters for SBT (spontaneous breathing trial) not being met.     *Results of SBT documented under SBTOUTCOME note.    Reason that defined ventilator parameters for SBT was not met              [] Patient condition requires increased ventilator settings  [] Requires increased sedation   [] Settings not within weaning range   [] SAT not completed   [] Physician orders    The patient was able to tolerate SBT.   RSBI  was 33 with EtCO2 of 41 and SpO2 of 93 on 35% FiO2.  Spontanteous VT was 508 and RR 17 breaths/min.  The patient was on SBT for  minutes     Evac tube was  hooked up with continuous low suction(20-30mmHg)      Cuff was  deflated to determine cuff leak. A leak  was not detected.     Unable to get agreement for goals because no family is present and patient cannot respond.

## 2024-11-24 NOTE — PROGRESS NOTES
CRITICAL CARE PROGRESS NOTE      [facility]    [facility]009-A  YOB: 1965  MRN: [id number]   PCP: Parmjit Tesfaye MD  Date of Admission: 11/20/2024  Chief Complaint:-Pyelonephritis    Assessment and Plan:    Sepsis 2/2 left pyelonephritis 2/2 nephrolithiasis: CT scan on 6/25 in the left side with cystic atrophy, right side.  US renal 11/21 showed left nephrolithiasis mild hydronephrosis.  Urology consulted and placed left ureteral stent on 11/21.  CXR 11/22 showed worsening pulmonary edema bilaterally with left-sided pleural effusion patient was diuresed and repeat CXR showed improved bilateral opacities. There is no growth blood or respiratory cultures.  Continue patient on cefepime and doxycycline.  Will continue to monitor and treat as needed and follow urology recommendations.  Acute on chronic hypoxic/hypercapnic respiratory failure 2/2 COPD exacerbation: Patient on home O2 2L.  11/20 rapid was called as she was hypoxic with no improvement on supplemental oxygen or NRB.  So patient was intubated and transferred to ICU.  Patient was started on Versed and fentanyl for sedation, Versed was discontinued through the night but her BP was increasing so she was placed back on Versed and also started on Cardene drip.  Patient failed SBT's with no cuff leak today we will try SBT tomorrow.  Continue Decadron, Pulmicort, cefepime, doxycycline, and albuterol.  Will continue to monitor and treat as needed.  New onset A-fib with RVR: Currently NSR, 11/22 EKG A-fib with RVR at 108 so patient was started on amiodarone and heparin drip with resumed home meds including Toprol with hold parameters.  Cardiology consulted and will follow the recommendations.  Today given patient's hypertension amiodarone was stopped on a Cardene drip started and Toprol dose increased from 50 mg to 100 mg.  Will continue to monitor and treat as needed.  HFrEF: Echo 11/21/2024 reported EF 30-35% likely 2/2

## 2024-11-25 ENCOUNTER — APPOINTMENT (OUTPATIENT)
Dept: GENERAL RADIOLOGY | Age: 59
End: 2024-11-25
Attending: STUDENT IN AN ORGANIZED HEALTH CARE EDUCATION/TRAINING PROGRAM
Payer: COMMERCIAL

## 2024-11-25 LAB
ACINETOBACTER CALCOACETICUS-BAUMANNII BY PCR: NOT DETECTED
ALBUMIN SERPL BCG-MCNC: 3 G/DL (ref 3.5–5.1)
ALP SERPL-CCNC: 78 U/L (ref 38–126)
ALT SERPL W/O P-5'-P-CCNC: 81 U/L (ref 11–66)
ANION GAP SERPL CALC-SCNC: 8 MEQ/L (ref 8–16)
AST SERPL-CCNC: 21 U/L (ref 5–40)
BACTERIA BLD AEROBE CULT: NORMAL
BACTERIA BLD AEROBE CULT: NORMAL
BACTERIA: ABNORMAL
BASOPHILS ABSOLUTE: 0 THOU/MM3 (ref 0–0.1)
BASOPHILS NFR BLD AUTO: 0.2 %
BILIRUB SERPL-MCNC: 0.4 MG/DL (ref 0.3–1.2)
BILIRUB UR QL STRIP: NEGATIVE
BLACTX-M ISLT/SPM QL: NORMAL
BLAIMP ISLT/SPM QL: NORMAL
BLAKPC ISLT/SPM QL: NORMAL
BLAOXA-48-LIKE ISLT/SPM QL: NORMAL
BLAVIM ISLT/SPM QL: NORMAL
BUN SERPL-MCNC: 68 MG/DL (ref 7–22)
C PNEUM DNA LOWER RESP QL NAA+NON-PROBE: NOT DETECTED
CA-I BLD ISE-SCNC: 1.18 MMOL/L (ref 1.12–1.32)
CALCIUM SERPL-MCNC: 8.1 MG/DL (ref 8.5–10.5)
CASTS #/AREA URNS LPF: ABNORMAL /LPF
CHARACTER UR: ABNORMAL
CHLORIDE SERPL-SCNC: 112 MEQ/L (ref 98–111)
CO2 SERPL-SCNC: 25 MEQ/L (ref 23–33)
COLOR UR: ABNORMAL
CREAT SERPL-MCNC: 1.5 MG/DL (ref 0.4–1.2)
CRYSTALS URNS QL MICRO: ABNORMAL
DEPRECATED RDW RBC AUTO: 45.1 FL (ref 35–45)
ENTEROBACTER CLOACAE COMPLEX BY PCR: NOT DETECTED
EOSINOPHIL NFR BLD AUTO: 0.1 %
EOSINOPHILS ABSOLUTE: 0 THOU/MM3 (ref 0–0.4)
EPITHELIAL CELLS, UA: ABNORMAL /HPF
ERYTHROCYTE [DISTWIDTH] IN BLOOD BY AUTOMATED COUNT: 16.2 % (ref 11.5–14.5)
ESCHERICHIA COLI BY PCR: NOT DETECTED
FLUAV RNA LOWER RESP QL NAA+NON-PROBE: NOT DETECTED
FLUBV RNA LOWER RESP QL NAA+NON-PROBE: NOT DETECTED
GFR SERPL CREATININE-BSD FRML MDRD: 39 ML/MIN/1.73M2
GLUCOSE BLD STRIP.AUTO-MCNC: 238 MG/DL (ref 70–108)
GLUCOSE BLD STRIP.AUTO-MCNC: 247 MG/DL (ref 70–108)
GLUCOSE BLD STRIP.AUTO-MCNC: 258 MG/DL (ref 70–108)
GLUCOSE BLD STRIP.AUTO-MCNC: 266 MG/DL (ref 70–108)
GLUCOSE SERPL-MCNC: 273 MG/DL (ref 70–108)
GLUCOSE UR QL STRIP.AUTO: 250 MG/DL
HADV DNA LOWER RESP QL NAA+NON-PROBE: NOT DETECTED
HAEMOPHILUS INFLUENZAE BY PCR: NOT DETECTED
HCOV RNA LOWER RESP QL NAA+NON-PROBE: NOT DETECTED
HCT VFR BLD AUTO: 30.8 % (ref 37–47)
HEPARIN UNFRACTIONATED: 0.78 U/ML (ref 0.3–0.7)
HEPARIN UNFRACTIONATED: 0.88 U/ML (ref 0.3–0.7)
HGB BLD-MCNC: 9.9 GM/DL (ref 12–16)
HGB UR QL STRIP.AUTO: ABNORMAL
HMPV RNA LOWER RESP QL NAA+NON-PROBE: NOT DETECTED
HPIV RNA LOWER RESP QL NAA+NON-PROBE: NOT DETECTED
IMM GRANULOCYTES # BLD AUTO: 1.48 THOU/MM3 (ref 0–0.07)
IMM GRANULOCYTES NFR BLD AUTO: 7.6 %
KETONES UR QL STRIP.AUTO: NEGATIVE
KLEBSIELLA AEROGENES BY PCR: NOT DETECTED
KLEBSIELLA OXYTOCA BY PCR: NOT DETECTED
KLEBSIELLA PNEUMONIAE GROUP BY PCR: NOT DETECTED
L PNEUMO DNA LOWER RESP QL NAA+NON-PROBE: NOT DETECTED
LEUKOCYTE ESTERASE UR QL STRIP.AUTO: ABNORMAL
LYMPHOCYTES ABSOLUTE: 1.1 THOU/MM3 (ref 1–4.8)
LYMPHOCYTES NFR BLD AUTO: 5.5 %
M PNEUMO DNA LOWER RESP QL NAA+NON-PROBE: NOT DETECTED
MAGNESIUM SERPL-MCNC: 2.1 MG/DL (ref 1.6–2.4)
MCH RBC QN AUTO: 24.9 PG (ref 26–33)
MCHC RBC AUTO-ENTMCNC: 32.1 GM/DL (ref 32.2–35.5)
MCV RBC AUTO: 77.6 FL (ref 81–99)
MONOCYTES ABSOLUTE: 1.2 THOU/MM3 (ref 0.4–1.3)
MONOCYTES NFR BLD AUTO: 5.9 %
MORAXELLA CATARRHALIS BY PCR: NOT DETECTED
MUCOUS THREADS URNS QL MICRO: ABNORMAL
NEUTROPHILS ABSOLUTE: 15.7 THOU/MM3 (ref 1.8–7.7)
NEUTROPHILS NFR BLD AUTO: 80.7 %
NITRITE UR QL STRIP.AUTO: NEGATIVE
NRBC BLD AUTO-RTO: 0 /100 WBC
PH UR STRIP.AUTO: 6 [PH] (ref 5–9)
PHOSPHATE SERPL-MCNC: 2.1 MG/DL (ref 2.4–4.7)
PLATELET # BLD AUTO: 231 THOU/MM3 (ref 130–400)
PLATELET BLD QL SMEAR: ADEQUATE
PMV BLD AUTO: 11.3 FL (ref 9.4–12.4)
POTASSIUM SERPL-SCNC: 3.9 MEQ/L (ref 3.5–5.2)
PROT SERPL-MCNC: 5.5 G/DL (ref 6.1–8)
PROT UR STRIP.AUTO-MCNC: 100 MG/DL
PROTEUS SPECIES BY PCR: NOT DETECTED
PSEUDOMONAS AERUGINOSA BY PCR: NOT DETECTED
RBC # BLD AUTO: 3.97 MILL/MM3 (ref 4.2–5.4)
RBC #/AREA URNS HPF: > 200 /HPF
RESISTANT GENE MECA/C & MREJ BY PCR: NORMAL
RESISTANT GENE NDM BY PCR: NORMAL
RSV RNA LOWER RESP QL NAA+NON-PROBE: NOT DETECTED
RV+EV RNA LOWER RESP QL NAA+NON-PROBE: NOT DETECTED
SCAN OF BLOOD SMEAR: NORMAL
SERRATIA MARCESCENS BY PCR: NOT DETECTED
SODIUM SERPL-SCNC: 145 MEQ/L (ref 135–145)
SOURCE: NORMAL
SPECIFIC GRAVITY UA: 1.02 (ref 1–1.03)
SPECIMEN ACCEPTABILITY: NORMAL
STAPH AUREUS BY PCR: NOT DETECTED
STREP AGALACTIAE BY PCR: NOT DETECTED
STREP PNEUMONIAE BY PCR: NOT DETECTED
STREP PYOGENES BY PCR: NOT DETECTED
TOXIC GRANULES BLD QL SMEAR: PRESENT
UROBILINOGEN, URINE: 0.2 EU/DL (ref 0–1)
WBC # BLD AUTO: 19.5 THOU/MM3 (ref 4.8–10.8)
WBC #/AREA URNS HPF: ABNORMAL /HPF

## 2024-11-25 PROCEDURE — 94640 AIRWAY INHALATION TREATMENT: CPT

## 2024-11-25 PROCEDURE — 6360000002 HC RX W HCPCS

## 2024-11-25 PROCEDURE — 6370000000 HC RX 637 (ALT 250 FOR IP): Performed by: PHYSICIAN ASSISTANT

## 2024-11-25 PROCEDURE — 2580000003 HC RX 258

## 2024-11-25 PROCEDURE — 87581 M.PNEUMON DNA AMP PROBE: CPT

## 2024-11-25 PROCEDURE — 6370000000 HC RX 637 (ALT 250 FOR IP)

## 2024-11-25 PROCEDURE — 85025 COMPLETE CBC W/AUTO DIFF WBC: CPT

## 2024-11-25 PROCEDURE — 81001 URINALYSIS AUTO W/SCOPE: CPT

## 2024-11-25 PROCEDURE — 80053 COMPREHEN METABOLIC PANEL: CPT

## 2024-11-25 PROCEDURE — 89220 SPUTUM SPECIMEN COLLECTION: CPT

## 2024-11-25 PROCEDURE — 99231 SBSQ HOSP IP/OBS SF/LOW 25: CPT | Performed by: UROLOGY

## 2024-11-25 PROCEDURE — 2700000000 HC OXYGEN THERAPY PER DAY

## 2024-11-25 PROCEDURE — 97530 THERAPEUTIC ACTIVITIES: CPT

## 2024-11-25 PROCEDURE — 87541 LEGION PNEUMO DNA AMP PROB: CPT

## 2024-11-25 PROCEDURE — 6370000000 HC RX 637 (ALT 250 FOR IP): Performed by: INTERNAL MEDICINE

## 2024-11-25 PROCEDURE — 83735 ASSAY OF MAGNESIUM: CPT

## 2024-11-25 PROCEDURE — 85520 HEPARIN ASSAY: CPT

## 2024-11-25 PROCEDURE — 97162 PT EVAL MOD COMPLEX 30 MIN: CPT

## 2024-11-25 PROCEDURE — 36415 COLL VENOUS BLD VENIPUNCTURE: CPT

## 2024-11-25 PROCEDURE — 82948 REAGENT STRIP/BLOOD GLUCOSE: CPT

## 2024-11-25 PROCEDURE — 2500000003 HC RX 250 WO HCPCS

## 2024-11-25 PROCEDURE — 87631 RESP VIRUS 3-5 TARGETS: CPT

## 2024-11-25 PROCEDURE — 6360000002 HC RX W HCPCS: Performed by: INTERNAL MEDICINE

## 2024-11-25 PROCEDURE — 99291 CRITICAL CARE FIRST HOUR: CPT | Performed by: INTERNAL MEDICINE

## 2024-11-25 PROCEDURE — 94003 VENT MGMT INPAT SUBQ DAY: CPT

## 2024-11-25 PROCEDURE — 87040 BLOOD CULTURE FOR BACTERIA: CPT

## 2024-11-25 PROCEDURE — 71045 X-RAY EXAM CHEST 1 VIEW: CPT

## 2024-11-25 PROCEDURE — 2000000000 HC ICU R&B

## 2024-11-25 PROCEDURE — 94761 N-INVAS EAR/PLS OXIMETRY MLT: CPT

## 2024-11-25 PROCEDURE — 87070 CULTURE OTHR SPECIMN AEROBIC: CPT

## 2024-11-25 PROCEDURE — 82330 ASSAY OF CALCIUM: CPT

## 2024-11-25 PROCEDURE — 87798 DETECT AGENT NOS DNA AMP: CPT

## 2024-11-25 PROCEDURE — 87486 CHLMYD PNEUM DNA AMP PROBE: CPT

## 2024-11-25 PROCEDURE — 2580000003 HC RX 258: Performed by: PHYSICIAN ASSISTANT

## 2024-11-25 PROCEDURE — 87205 SMEAR GRAM STAIN: CPT

## 2024-11-25 PROCEDURE — 84100 ASSAY OF PHOSPHORUS: CPT

## 2024-11-25 PROCEDURE — 87106 FUNGI IDENTIFICATION YEAST: CPT

## 2024-11-25 RX ORDER — INSULIN GLARGINE 100 [IU]/ML
20 INJECTION, SOLUTION SUBCUTANEOUS DAILY
Status: DISCONTINUED | OUTPATIENT
Start: 2024-11-26 | End: 2024-11-26

## 2024-11-25 RX ORDER — POLYETHYLENE GLYCOL 3350 17 G/17G
17 POWDER, FOR SOLUTION ORAL DAILY
Status: DISCONTINUED | OUTPATIENT
Start: 2024-11-25 | End: 2024-12-06 | Stop reason: HOSPADM

## 2024-11-25 RX ORDER — CHLORHEXIDINE GLUCONATE ORAL RINSE 1.2 MG/ML
15 SOLUTION DENTAL 2 TIMES DAILY
Status: DISCONTINUED | OUTPATIENT
Start: 2024-11-25 | End: 2024-11-28

## 2024-11-25 RX ORDER — SENNOSIDES A AND B 8.6 MG/1
1 TABLET, FILM COATED ORAL NIGHTLY
Status: DISCONTINUED | OUTPATIENT
Start: 2024-11-25 | End: 2024-11-27

## 2024-11-25 RX ADMIN — INSULIN LISPRO 4 UNITS: 100 INJECTION, SOLUTION INTRAVENOUS; SUBCUTANEOUS at 17:42

## 2024-11-25 RX ADMIN — ATORVASTATIN CALCIUM 20 MG: 20 TABLET, FILM COATED ORAL at 20:45

## 2024-11-25 RX ADMIN — BUDESONIDE 250 MCG: 0.25 INHALANT RESPIRATORY (INHALATION) at 19:19

## 2024-11-25 RX ADMIN — ACETAMINOPHEN 650 MG: 325 TABLET ORAL at 07:47

## 2024-11-25 RX ADMIN — INSULIN LISPRO 8 UNITS: 100 INJECTION, SOLUTION INTRAVENOUS; SUBCUTANEOUS at 11:53

## 2024-11-25 RX ADMIN — DOCUSATE SODIUM 100 MG: 50 LIQUID ORAL at 07:47

## 2024-11-25 RX ADMIN — SODIUM CHLORIDE, PRESERVATIVE FREE 10 ML: 5 INJECTION INTRAVENOUS at 07:49

## 2024-11-25 RX ADMIN — ALBUTEROL SULFATE 2.5 MG: 2.5 SOLUTION RESPIRATORY (INHALATION) at 00:57

## 2024-11-25 RX ADMIN — BUDESONIDE 250 MCG: 0.25 INHALANT RESPIRATORY (INHALATION) at 08:27

## 2024-11-25 RX ADMIN — PANTOPRAZOLE SODIUM 40 MG: 40 INJECTION, POWDER, FOR SOLUTION INTRAVENOUS at 07:46

## 2024-11-25 RX ADMIN — HYDRALAZINE HYDROCHLORIDE 10 MG: 20 INJECTION INTRAMUSCULAR; INTRAVENOUS at 07:47

## 2024-11-25 RX ADMIN — DOXYCYCLINE 100 MG: 100 INJECTION, POWDER, LYOPHILIZED, FOR SOLUTION INTRAVENOUS at 13:39

## 2024-11-25 RX ADMIN — SENNOSIDES 8.6 MG: 8.6 TABLET, FILM COATED ORAL at 20:45

## 2024-11-25 RX ADMIN — METOPROLOL TARTRATE 50 MG: 50 TABLET, FILM COATED ORAL at 07:48

## 2024-11-25 RX ADMIN — HYDROCORTISONE SODIUM SUCCINATE 100 MG: 100 INJECTION, POWDER, FOR SOLUTION INTRAMUSCULAR; INTRAVENOUS at 15:08

## 2024-11-25 RX ADMIN — HEPARIN SODIUM 15 UNITS/KG/HR: 10000 INJECTION, SOLUTION INTRAVENOUS at 11:23

## 2024-11-25 RX ADMIN — METOPROLOL TARTRATE 50 MG: 50 TABLET, FILM COATED ORAL at 20:45

## 2024-11-25 RX ADMIN — Medication 100 MCG/HR: at 02:38

## 2024-11-25 RX ADMIN — HYDRALAZINE HYDROCHLORIDE 10 MG: 20 INJECTION INTRAMUSCULAR; INTRAVENOUS at 13:20

## 2024-11-25 RX ADMIN — TIOTROPIUM BROMIDE AND OLODATEROL 2 PUFF: 3.124; 2.736 SPRAY, METERED RESPIRATORY (INHALATION) at 08:28

## 2024-11-25 RX ADMIN — CEFEPIME 2000 MG: 2 INJECTION, POWDER, FOR SOLUTION INTRAVENOUS at 08:24

## 2024-11-25 RX ADMIN — INSULIN LISPRO 8 UNITS: 100 INJECTION, SOLUTION INTRAVENOUS; SUBCUTANEOUS at 07:12

## 2024-11-25 RX ADMIN — CHLORHEXIDINE GLUCONATE, 0.12% ORAL RINSE 15 ML: 1.2 SOLUTION DENTAL at 15:07

## 2024-11-25 RX ADMIN — HYDROXYZINE HYDROCHLORIDE 10 MG: 10 TABLET ORAL at 07:48

## 2024-11-25 RX ADMIN — CEFEPIME 2000 MG: 2 INJECTION, POWDER, FOR SOLUTION INTRAVENOUS at 20:16

## 2024-11-25 RX ADMIN — DOXYCYCLINE 100 MG: 100 INJECTION, POWDER, LYOPHILIZED, FOR SOLUTION INTRAVENOUS at 02:40

## 2024-11-25 RX ADMIN — SODIUM PHOSPHATE, MONOBASIC, MONOHYDRATE AND SODIUM PHOSPHATE, DIBASIC, ANHYDROUS 10 MMOL: 142; 276 INJECTION, SOLUTION INTRAVENOUS at 06:03

## 2024-11-25 RX ADMIN — INSULIN GLARGINE 15 UNITS: 100 INJECTION, SOLUTION SUBCUTANEOUS at 08:30

## 2024-11-25 RX ADMIN — POLYETHYLENE GLYCOL 3350 17 G: 17 POWDER, FOR SOLUTION ORAL at 07:47

## 2024-11-25 RX ADMIN — HYDROCORTISONE SODIUM SUCCINATE 100 MG: 100 INJECTION, POWDER, FOR SOLUTION INTRAMUSCULAR; INTRAVENOUS at 07:46

## 2024-11-25 RX ADMIN — CHLORHEXIDINE GLUCONATE, 0.12% ORAL RINSE 15 ML: 1.2 SOLUTION DENTAL at 20:41

## 2024-11-25 RX ADMIN — METOPROLOL TARTRATE 50 MG: 50 TABLET, FILM COATED ORAL at 00:15

## 2024-11-25 RX ADMIN — HYDROXYZINE HYDROCHLORIDE 10 MG: 10 TABLET ORAL at 20:48

## 2024-11-25 RX ADMIN — INSULIN LISPRO 4 UNITS: 100 INJECTION, SOLUTION INTRAVENOUS; SUBCUTANEOUS at 20:45

## 2024-11-25 RX ADMIN — Medication 90 MCG/HR: at 15:07

## 2024-11-25 ASSESSMENT — PULMONARY FUNCTION TESTS
PIF_VALUE: 16
PIF_VALUE: 21
PIF_VALUE: 18
PIF_VALUE: 18
PIF_VALUE: 16
PIF_VALUE: 16

## 2024-11-25 NOTE — PLAN OF CARE
Problem: Respiratory - Adult  Goal: Achieves optimal ventilation and oxygenation  11/25/2024 0604 by Matilda Scanlon RCP  Outcome: Progressing                                               Patient Weaning Progress    The patient's vent settings was able to be weaned this shift.      Ventilator settings that were weaned              [x] Mode   [x] Pressure support weaned   [x] Fio2 weaned   [x] Peep weaned      Spontaneous weaning trial  was attempted.     due to defined parameters for SBT (spontaneous breathing trial) not being met.     *Results of SBT documented under SBTOUTCOME note.    Reason that defined ventilator parameters for SBT was not met              [] Patient condition requires increased ventilator settings  [] Requires increased sedation   [] Settings not within weaning range   [] SAT not completed   [] Physician orders    The patient was able to tolerate SBT. SpO2 of 93 on 40% FiO2.  Spontanteous VT was 432 and RR 17 breaths/min.  The patient was on SBT for 10 minutes     Cuff was  deflated to determine cuff leak. A leak  was not detected.     Unable to get agreement for goals because no family is present and patient cannot respond.

## 2024-11-25 NOTE — PLAN OF CARE
Problem: Discharge Planning  Goal: Discharge to home or other facility with appropriate resources  11/25/2024 0858 by Moises Jesus, RN  Outcome: Progressing  11/24/2024 2028 by Yecenia Tariq RN  Outcome: Progressing  Flowsheets (Taken 11/24/2024 2028)  Discharge to home or other facility with appropriate resources:   Identify barriers to discharge with patient and caregiver   Identify discharge learning needs (meds, wound care, etc)   Arrange for needed discharge resources and transportation as appropriate     Problem: Pain  Goal: Verbalizes/displays adequate comfort level or baseline comfort level  11/25/2024 0858 by Moises Jesus, RN  Outcome: Progressing  11/24/2024 2028 by Yecenia Tariq RN  Outcome: Progressing  Flowsheets (Taken 11/24/2024 2028)  Verbalizes/displays adequate comfort level or baseline comfort level:   Encourage patient to monitor pain and request assistance   Administer analgesics based on type and severity of pain and evaluate response   Implement non-pharmacological measures as appropriate and evaluate response   Assess pain using appropriate pain scale     Problem: Neurosensory - Adult  Goal: Achieves stable or improved neurological status  11/25/2024 0858 by Moises Jesus, RN  Outcome: Progressing  11/24/2024 2028 by Yecenia Tariq, RN  Outcome: Progressing  Flowsheets (Taken 11/24/2024 2028)  Achieves stable or improved neurological status: Assess for and report changes in neurological status  Goal: Achieves maximal functionality and self care  11/25/2024 0858 by Moises Jesus, RN  Outcome: Progressing  11/24/2024 2028 by Yecenia Tariq RN  Outcome: Progressing  Flowsheets (Taken 11/24/2024 2028)  Achieves maximal functionality and self care: Monitor swallowing and airway patency with patient fatigue and changes in neurological status     Problem: Respiratory - Adult  Goal: Achieves optimal ventilation and oxygenation  11/25/2024 0858 by Moises Jesus  hypoglycemia   Assess barriers to adequate nutritional intake and initiate nutrition consult as needed     Problem: Confusion  Goal: Confusion, delirium, dementia, or psychosis is improved or at baseline  Description: INTERVENTIONS:  1. Assess for possible contributors to thought disturbance, including medications, impaired vision or hearing, underlying metabolic abnormalities, dehydration, psychiatric diagnoses, and notify attending LIP  2. [place] high risk fall precautions, as indicated  3. Provide frequent short contacts to provide reality reorientation, refocusing and direction  4. Decrease environmental stimuli, including noise as appropriate  5. Monitor and intervene to maintain adequate nutrition, hydration, elimination, sleep and activity  6. If unable to ensure safety without constant attention obtain sitter and review sitter guidelines with assigned personnel  7. Initiate Psychosocial CNS and Spiritual Care consult, as indicated  11/25/2024 0858 by Moises Jesus, RN  Outcome: Progressing  11/24/2024 2028 by Yecenia Tariq, RN  Outcome: Not Progressing  Flowsheets (Taken 11/24/2024 2028)  Effect of thought disturbance (confusion, delirium, dementia, or psychosis) are managed with adequate functional status:   Assess for contributors to thought disturbance, including medications, impaired vision or hearing, underlying metabolic abnormalities, dehydration, psychiatric diagnoses, notify LIP   [place] high risk fall precautions, as indicated   Provide frequent short contacts to provide reality reorientation, refocusing and direction   Decrease environmental stimuli, including noise as appropriate   Monitor and intervene to maintain adequate nutrition, hydration, elimination, sleep and activity

## 2024-11-25 NOTE — PROGRESS NOTES
Jw Leonard, YOHANNES - CNP  Urology Progress Note    Subjective: Priya Lema is a 59 y.o. female.    s/p CYSTOSCOPY LEFT URETERAL STENT INSERTION on 11/20/2024 - 11/21/2024    His/Her current Diet is: Diet NPO Exceptions are: Sips of Water with Meds  ADULT TUBE FEEDING; Orogastric; Renal Formula; Continuous; 10; Yes; 10; Q 8 hours; 30; 30; Q 4 hours.    Since the previous note, the patient reports the following:  Intubated, sedated in ICU  No fevers or chills.  No nausea or vomiting.  No chest pain or shortness of breath.  No calf pain.  Pain Controlled.    Concerns for low UOP  PHYLLIS ordered - no hydro, no stent noted  KUB shows good stent position  No visualization of stent per nurse, family  Salazar in place + cherry colored urine, no clots  Salazar flushes easily per nurse    Hand irrigate as needed  Plan to manage hematuria with hand irrigation, call with questions  May need irrigation catheter if worsens      Vitals and Labs:  Patient Vitals for the past 24 hrs:   BP Temp Temp src Pulse Resp SpO2 Weight   11/25/24 0930 (!) 141/45 -- -- 63 13 94 % --   11/25/24 0915 (!) 133/43 -- -- 60 10 94 % --   11/25/24 0900 (!) 138/46 -- -- 62 12 93 % --   11/25/24 0845 (!) 143/51 -- -- 64 13 92 % --   11/25/24 0830 (!) 159/62 -- -- 75 16 93 % --   11/25/24 0821 -- -- -- -- -- 91 % --   11/25/24 0820 -- 98.9 °F (37.2 °C) Oral -- -- -- --   11/25/24 0815 (!) 162/63 -- -- 69 15 91 % --   11/25/24 0800 (!) 177/88 -- -- 70 13 91 % --   11/25/24 0745 (!) 177/76 -- -- 75 16 91 % --   11/25/24 0730 (!) 160/93 -- -- 73 13 92 % --   11/25/24 0715 (!) 176/71 -- -- 69 13 91 % --   11/25/24 0700 (!) 166/88 -- -- 68 12 91 % --   11/25/24 0645 (!) 162/68 -- -- 70 12 91 % --   11/25/24 0630 (!) 158/100 -- -- 65 12 92 % --   11/25/24 0615 (!) 150/62 -- -- 75 14 93 % --   11/25/24 0600 (!) 154/54 -- -- 66 11 93 % --   11/25/24 0545 (!) 147/60 -- -- 69 13 94 % --   11/25/24 0530 (!) 132/54 -- -- 64 11 94 % --   11/25/24 0515 (!) 135/58 --    MCV 77.7* 77.0* 77.6*    202 231     Recent Labs     11/23/24  0430 11/24/24  0456 11/25/24  0422    144 145   K 3.9 3.8 3.9    109 112*   CO2 20* 21* 25   PHOS 3.7 2.8 2.1*   BUN 68* 69* 68*   CREATININE 2.4* 2.0* 1.5*       Recent Labs     11/25/24  0840   COLORU RED*   PHUR 6.0   LABCAST 0-4 C.GRAN   WBCUA >100W/CLUMPS   RBCUA > 200   MUCUS NONE SEEN   BACTERIA FEW   LEUKOCYTESUR SMALL*   UROBILINOGEN 0.2   BILIRUBINUR NEGATIVE   BLOODU LARGE*       Physical Exam:  No acute distress.   Neck is supple  Regular rate and rhythm. Normal peripheral pulses  No accessory muscles of inspiration. Symmetric chest rise  Abdomen soft, non-tender, non-distended. No CVA tenderness. Salazar + cherry colored urine  No calf pain. Minimal/no edema in bilateral lower extremities.  Skin is warm, dry  Psych: mood, affect normal    Additional Lab/Culture results:     Imaging Reviewed:     YOHANNES Olivarez CNP independently reviewed the images and verified the radiology reports from:    US RENAL COMPLETE    Result Date: 11/22/2024  PROCEDURE: US RENAL COMPLETE CLINICAL INFORMATION: Indication provided by the ordering physician as \"s/p stent, drop in UOP.\" TECHNIQUE: Ultrasound of the kidneys and urinary bladder was performed. Grayscale and color images were obtained. COMPARISON: Renal ultrasound 11/21/2024 FINDINGS: The right kidney measures 12.5 x 8.0 x 5.0 cm and the left kidney measures 11.6 x 6.2 x 6.2 cm. Renal cortical thickness is normal. An anechoic structure at the right mid kidney measures 5.2 cm. An anechoic structure at the right lower kidney measures 3.2 cm. There is no hydronephrosis or renal calculi. The urinary bladder is not visualized by the sonographer.     Probable right renal cysts. **This report has been created using voice recognition software. It may contain minor errors which are inherent in voice recognition technology.** Electronically signed by Dr. Jamir Hartmann (TTE)

## 2024-11-25 NOTE — PROGRESS NOTES
by Cory Carver, DO  CRITICAL CARE SPECIALIST    Patient seen by me including key components of medical care.  Case discussed with resident physician.  CXR improving.  Creatinine improved..  Italicized bold font, if present,  represents changes to the note made by me.  CC time 35 minutes.  Time was discontiguous. Time does not include procedure. Time does include my direct assessment of the patient and coordination of care.  Time represents more than 50% of the time involved with patient care by the CC team.  Electronically signed by Krishna Sánchez MD.

## 2024-11-25 NOTE — PLAN OF CARE
Problem: Genitourinary - Adult  Goal: Absence of urinary retention  11/24/2024 2028 by Yecenia Tariq RN  Outcome: Not Progressing  Flowsheets (Taken 11/24/2024 2028)  Absence of urinary retention:   Assess patient’s ability to void and empty bladder   Monitor intake/output and perform bladder scan as needed  11/24/2024 0944 by Evelin Glaser RN  Outcome: Not Progressing  Flowsheets  Taken 11/24/2024 0745 by Evelin Glaser RN  Absence of urinary retention: Assess patient’s ability to void and empty bladder  Taken 11/23/2024 1947 by Yue Boucher RN  Absence of urinary retention: Monitor intake/output and perform bladder scan as needed     Problem: Metabolic/Fluid and Electrolytes - Adult  Goal: Glucose maintained within prescribed range  11/24/2024 2028 by Yecenia Tariq RN  Outcome: Not Progressing  Flowsheets (Taken 11/24/2024 2028)  Glucose maintained within prescribed range:   Monitor blood glucose as ordered   Administer ordered medications to maintain glucose within target range   Assess for signs and symptoms of hyperglycemia and hypoglycemia   Assess barriers to adequate nutritional intake and initiate nutrition consult as needed  11/24/2024 0944 by Evelin Glaser RN  Outcome: Not Progressing  Flowsheets (Taken 11/23/2024 1947 by Yue Boucher RN)  Glucose maintained within prescribed range: Monitor blood glucose as ordered     Problem: Confusion  Goal: Confusion, delirium, dementia, or psychosis is improved or at baseline  Description: INTERVENTIONS:  1. Assess for possible contributors to thought disturbance, including medications, impaired vision or hearing, underlying metabolic abnormalities, dehydration, psychiatric diagnoses, and notify attending LIP  2. [place] high risk fall precautions, as indicated  3. Provide frequent short contacts to provide reality reorientation, refocusing and direction  4. Decrease environmental stimuli, including noise as appropriate  5. Monitor

## 2024-11-25 NOTE — CARE COORDINATION
11/25/24, 1:01 PM EST    DISCHARGE ON GOING EVALUATION    Priya MILIAN [facility] day: 5  Location: -09/009-A Reason for admit: Pyelonephritis [N12]  Sepsis (HCC) [A41.9]     Procedures:   11/20 CVC RIJ - placed at [facility]  11/20 Intubated  11/21 Cystoscopy with left ureteral stent insertion  11/21 ECHO: EF 30-35%; mod global hypokinesis    Imaging since last note:   11/25 CXR: Persistent under aeration versus infiltrate in the left lung base  and medial right lung base.    Barriers to Discharge: Cardiology consulted for new drop in EF, possible ischemic workup, elevated trop, new Afib. Plan for cardiac cath when clinical condition improves. Still has no cuff leak, so unable to extubate. Started on IV Solucortef for 5 days.     Remains on vent w/ETT on SBT, FIO2 40%, sats 94%. Afebrile. NSR. Follows commands x4. Intensivist, Cardiology, and Urology following. Urine +EColi - low colony count. Intensivist, Urology, and Cardiology following. SLP/PT/OT. Telemetry, CVC, OG w/TF, montague, SCDs. Fentanyl @ 100 mcg/hr, heparin drip, nebs, IV cefepime Q12H, peridex, IV doxycycline Q12H, prn IV hydralazine, IV solucortef 100 mg Q8H, prn atarax, lantus, SSI, inhaler, prednisone.  Blood and respiratory cultures sent today. BUN 68, Creat down to 1.5, phos 2.1, alb 3, alt 81, total pro 5.5, wbc 19.5, hgb 9.9.     PCP: Parmjit Tesfaye MD  Readmission Risk Score: 17.2    Patient Goals/Plan/Treatment Preferences: Home alone with new HH. Wears 2L O2 ATC and 3 or 4L O2 at HS thru [facility]. Has nebs. SW consulted. Monitor for possible LifeVest.

## 2024-11-25 NOTE — PROGRESS NOTES
Patient Weaning Progress    The patient's vent settings was able to be weaned this shift.      Ventilator settings that were weaned              [x] Mode   [] Pressure support weaned   [] Fio2 weaned   [] Peep weaned             Spontaneous weaning trial  was attempted.     Cuff was  deflated to determine cuff leak. A leak  was not detected. %

## 2024-11-25 NOTE — PROGRESS NOTES
[facility]  INPATIENT PHYSICAL THERAPY  EARLY MOBILITY EVALUATION  [facility] ICU 4D - 4D-09/009-A    Discharge Recommendations: Continue to assess pending progress  Equipment Recommendations:    Pend pt progress            Time In: 1008  Time Out: 1029  Timed Code Treatment Minutes: 13 Minutes  Minutes: 21         CoTx with OT due to increased medical complexity, 2+ physical assist and inability to tolerate separate sessions at this time.    Date: 2024  Patient Name: Priya Lema,  Gender:  female        MRN: [id number]  : 1965  (59 y.o.)      Referring Practitioner: Shireen Murillo MD  Diagnosis: Pyelonephritis  Additional Pertinent Hx: 59 y.o. female with a history of hypertension, hyperlipidemia, COPD, and chronic respiratory failure with hypoxia who was transferred from an outside facility for sepsis secondary to left-sided pyelonephritis with an associated left-sided nephrolithiasis.  The patient reports she presented to the outside facility initially for some left-sided back/flank pain.  She was not having any associated urinary symptoms at that time.  She does report some subjective fevers and chills.  She was found to have a left-sided nephrolithiasis and was transferred to [facility] for urology evaluation.  At this time, she does report some mild pain.  She is on oxygen at baseline.Rapid response  for decreased mentation, afib 's. Given 1L fld bolus. Transferred to ICU and intubated for airway protection. Taken to surgery today for cystoscopy with left ureteral stent. s/p CYSTOSCOPY LEFT URETERAL STENT INSERTION on . Unable to extubate post-op d/t tongue swelling and no cuff leak. Pt still intubated on eval      Restrictions/Precautions:  Restrictions/Precautions: General Precautions, Fall Risk  Position Activity Restriction  Other position/activity restrictions: monitor BP & HR    Subjective:  Chart Reviewed: Yes  Patient assessed for rehabilitation

## 2024-11-25 NOTE — PROGRESS NOTES
[facility]  SPEECH THERAPY MISSED TREATMENT NOTE  [facility] ICU 4D      Date: 2024  Patient Name: Priya Lema        MRN: [id number]    : 1965  (59 y.o.)    REASON FOR MISSED TREATMENT:  YONNY De Leon approved ST session. Upon ST arrival, patient resting comfortably on the vent with ETT in place. Bilateral wrist restraints in place. Upon call of name, patient opened eyes and able to answer basic yes/no response; however, patient consistently reaching for ETT and wriggling down in bed despite ST re-direction. Session attempt discontinued d/t increase in restlessness. YONNY De Leon made aware. ST to f/u on subsequent date as schedule permits.     Joyce Thompson MA, CCC-SLP [id number]

## 2024-11-25 NOTE — PROGRESS NOTES
[facility]  [facility] ICU 4D  Occupational Therapy Early Mobility  Daily Note     Discharge Recommendations: Continue to assess pending progress and anticipate need for Inpatient Therapy stay.  Equipment Recommendations:   monitor pending progress      Time In: 1003  Time Out: 1029  Timed Code Treatment Minutes: 26 Minutes  Minutes: 26         CoTx with PT due to increased medical complexity, 2+ physical assist and inability to tolerate separate sessions at this time.    Date: 2024  Patient Name: Priya Lema,   Gender: female      Room: [facility]  MRN: [id number]  : 1965  (59 y.o.)  Referring Practitioner: Shireen Murillo MD  Diagnosis: Pyelonephritis  Additional Pertinent Hx: Per H & P:59 y.o. female with a history of hypertension, hyperlipidemia, COPD, and chronic respiratory failure with hypoxia who was transferred from an outside facility for sepsis secondary to left-sided pyelonephritis with an associated left-sided nephrolithiasis.  The patient reports she presented to the outside facility initially for some left-sided back/flank pain.  She was not having any associated urinary symptoms at that time.  She does report some subjective fevers and chills.  She was found to have a left-sided nephrolithiasis and was transferred to [facility] for urology evaluation.  At this time, she does report some mild pain.  She is on oxygen at baseline.Rapid response  for decreased mentation, afib 's. Given 1L fld bolus. Transferred to ICU and intubated for airway protection. Taken to surgery today for cystoscopy with left ureteral stent. s/p CYSTOSCOPY LEFT URETERAL STENT INSERTION on . Unable to extubate post-op d/t tongue swelling and no cuff leak. Pt still intubated on eval     Restrictions/Precautions:  Restrictions/Precautions: General Precautions, Fall Risk  Position Activity Restriction  Other position/activity restrictions: monitor BP & HR     SUBJECTIVE: Pt

## 2024-11-26 ENCOUNTER — APPOINTMENT (OUTPATIENT)
Dept: CT IMAGING | Age: 59
End: 2024-11-26
Attending: STUDENT IN AN ORGANIZED HEALTH CARE EDUCATION/TRAINING PROGRAM
Payer: COMMERCIAL

## 2024-11-26 ENCOUNTER — APPOINTMENT (OUTPATIENT)
Dept: GENERAL RADIOLOGY | Age: 59
End: 2024-11-26
Attending: STUDENT IN AN ORGANIZED HEALTH CARE EDUCATION/TRAINING PROGRAM
Payer: COMMERCIAL

## 2024-11-26 LAB
ALBUMIN SERPL BCG-MCNC: 3.2 G/DL (ref 3.5–5.1)
ALP SERPL-CCNC: 84 U/L (ref 38–126)
ALT SERPL W/O P-5'-P-CCNC: 60 U/L (ref 11–66)
ANION GAP SERPL CALC-SCNC: 11 MEQ/L (ref 8–16)
AST SERPL-CCNC: 17 U/L (ref 5–40)
BILIRUB SERPL-MCNC: 0.6 MG/DL (ref 0.3–1.2)
BUN SERPL-MCNC: 69 MG/DL (ref 7–22)
CA-I BLD ISE-SCNC: 1.17 MMOL/L (ref 1.12–1.32)
CALCIUM SERPL-MCNC: 8.3 MG/DL (ref 8.5–10.5)
CHLORIDE SERPL-SCNC: 111 MEQ/L (ref 98–111)
CO2 SERPL-SCNC: 24 MEQ/L (ref 23–33)
CREAT SERPL-MCNC: 1.3 MG/DL (ref 0.4–1.2)
DEPRECATED RDW RBC AUTO: 45.7 FL (ref 35–45)
ERYTHROCYTE [DISTWIDTH] IN BLOOD BY AUTOMATED COUNT: 16.7 % (ref 11.5–14.5)
GFR SERPL CREATININE-BSD FRML MDRD: 47 ML/MIN/1.73M2
GLUCOSE BLD STRIP.AUTO-MCNC: 188 MG/DL (ref 70–108)
GLUCOSE BLD STRIP.AUTO-MCNC: 200 MG/DL (ref 70–108)
GLUCOSE BLD STRIP.AUTO-MCNC: 289 MG/DL (ref 70–108)
GLUCOSE BLD STRIP.AUTO-MCNC: 310 MG/DL (ref 70–108)
GLUCOSE SERPL-MCNC: 256 MG/DL (ref 70–108)
HCT VFR BLD AUTO: 32.2 % (ref 37–47)
HEPARIN UNFRACTIONATED: 0.59 U/ML (ref 0.3–0.7)
HEPARIN UNFRACTIONATED: 0.63 U/ML (ref 0.3–0.7)
HEPARIN UNFRACTIONATED: 0.83 U/ML (ref 0.3–0.7)
HGB BLD-MCNC: 10.4 GM/DL (ref 12–16)
MAGNESIUM SERPL-MCNC: 2.2 MG/DL (ref 1.6–2.4)
MCH RBC QN AUTO: 24.9 PG (ref 26–33)
MCHC RBC AUTO-ENTMCNC: 32.3 GM/DL (ref 32.2–35.5)
MCV RBC AUTO: 77.2 FL (ref 81–99)
PHOSPHATE SERPL-MCNC: 2.1 MG/DL (ref 2.4–4.7)
PLATELET # BLD AUTO: 268 THOU/MM3 (ref 130–400)
PMV BLD AUTO: 11.3 FL (ref 9.4–12.4)
POTASSIUM SERPL-SCNC: 3.9 MEQ/L (ref 3.5–5.2)
PROT SERPL-MCNC: 5.8 G/DL (ref 6.1–8)
RBC # BLD AUTO: 4.17 MILL/MM3 (ref 4.2–5.4)
SODIUM SERPL-SCNC: 146 MEQ/L (ref 135–145)
WBC # BLD AUTO: 29.3 THOU/MM3 (ref 4.8–10.8)

## 2024-11-26 PROCEDURE — 82948 REAGENT STRIP/BLOOD GLUCOSE: CPT

## 2024-11-26 PROCEDURE — 6360000002 HC RX W HCPCS

## 2024-11-26 PROCEDURE — 6360000002 HC RX W HCPCS: Performed by: INTERNAL MEDICINE

## 2024-11-26 PROCEDURE — 2580000003 HC RX 258

## 2024-11-26 PROCEDURE — 36415 COLL VENOUS BLD VENIPUNCTURE: CPT

## 2024-11-26 PROCEDURE — 83735 ASSAY OF MAGNESIUM: CPT

## 2024-11-26 PROCEDURE — 2500000003 HC RX 250 WO HCPCS: Performed by: STUDENT IN AN ORGANIZED HEALTH CARE EDUCATION/TRAINING PROGRAM

## 2024-11-26 PROCEDURE — 2500000003 HC RX 250 WO HCPCS

## 2024-11-26 PROCEDURE — 71045 X-RAY EXAM CHEST 1 VIEW: CPT

## 2024-11-26 PROCEDURE — 94761 N-INVAS EAR/PLS OXIMETRY MLT: CPT

## 2024-11-26 PROCEDURE — 84100 ASSAY OF PHOSPHORUS: CPT

## 2024-11-26 PROCEDURE — 6370000000 HC RX 637 (ALT 250 FOR IP): Performed by: INTERNAL MEDICINE

## 2024-11-26 PROCEDURE — 70450 CT HEAD/BRAIN W/O DYE: CPT

## 2024-11-26 PROCEDURE — 6370000000 HC RX 637 (ALT 250 FOR IP)

## 2024-11-26 PROCEDURE — 2700000000 HC OXYGEN THERAPY PER DAY

## 2024-11-26 PROCEDURE — 2580000003 HC RX 258: Performed by: STUDENT IN AN ORGANIZED HEALTH CARE EDUCATION/TRAINING PROGRAM

## 2024-11-26 PROCEDURE — 94003 VENT MGMT INPAT SUBQ DAY: CPT

## 2024-11-26 PROCEDURE — 2580000003 HC RX 258: Performed by: PHYSICIAN ASSISTANT

## 2024-11-26 PROCEDURE — 94640 AIRWAY INHALATION TREATMENT: CPT

## 2024-11-26 PROCEDURE — 2060000000 HC ICU INTERMEDIATE R&B

## 2024-11-26 PROCEDURE — 85520 HEPARIN ASSAY: CPT

## 2024-11-26 PROCEDURE — 82330 ASSAY OF CALCIUM: CPT

## 2024-11-26 PROCEDURE — 99291 CRITICAL CARE FIRST HOUR: CPT | Performed by: INTERNAL MEDICINE

## 2024-11-26 PROCEDURE — 80053 COMPREHEN METABOLIC PANEL: CPT

## 2024-11-26 PROCEDURE — 94660 CPAP INITIATION&MGMT: CPT

## 2024-11-26 PROCEDURE — 85027 COMPLETE CBC AUTOMATED: CPT

## 2024-11-26 RX ORDER — HYDRALAZINE HYDROCHLORIDE 20 MG/ML
10 INJECTION INTRAMUSCULAR; INTRAVENOUS EVERY 4 HOURS PRN
Status: DISCONTINUED | OUTPATIENT
Start: 2024-11-26 | End: 2024-12-06 | Stop reason: HOSPADM

## 2024-11-26 RX ORDER — METOPROLOL TARTRATE 1 MG/ML
5 INJECTION, SOLUTION INTRAVENOUS EVERY 6 HOURS
Status: DISCONTINUED | OUTPATIENT
Start: 2024-11-26 | End: 2024-11-27

## 2024-11-26 RX ORDER — HYDROCORTISONE SODIUM SUCCINATE 100 MG/2ML
100 INJECTION INTRAMUSCULAR; INTRAVENOUS EVERY 8 HOURS
Status: DISCONTINUED | OUTPATIENT
Start: 2024-11-26 | End: 2024-11-26

## 2024-11-26 RX ORDER — HYDRALAZINE HYDROCHLORIDE 20 MG/ML
10 INJECTION INTRAMUSCULAR; INTRAVENOUS ONCE
Status: COMPLETED | OUTPATIENT
Start: 2024-11-26 | End: 2024-11-26

## 2024-11-26 RX ORDER — INSULIN GLARGINE 100 [IU]/ML
25 INJECTION, SOLUTION SUBCUTANEOUS DAILY
Status: DISCONTINUED | OUTPATIENT
Start: 2024-11-26 | End: 2024-11-26

## 2024-11-26 RX ORDER — SODIUM CHLORIDE 450 MG/100ML
INJECTION, SOLUTION INTRAVENOUS CONTINUOUS
Status: ACTIVE | OUTPATIENT
Start: 2024-11-26 | End: 2024-11-27

## 2024-11-26 RX ORDER — INSULIN GLARGINE 100 [IU]/ML
30 INJECTION, SOLUTION SUBCUTANEOUS DAILY
Status: DISCONTINUED | OUTPATIENT
Start: 2024-11-27 | End: 2024-11-28

## 2024-11-26 RX ADMIN — INSULIN LISPRO 4 UNITS: 100 INJECTION, SOLUTION INTRAVENOUS; SUBCUTANEOUS at 20:42

## 2024-11-26 RX ADMIN — SODIUM CHLORIDE 5 MG/HR: 9 INJECTION, SOLUTION INTRAVENOUS at 08:26

## 2024-11-26 RX ADMIN — INSULIN LISPRO 8 UNITS: 100 INJECTION, SOLUTION INTRAVENOUS; SUBCUTANEOUS at 11:32

## 2024-11-26 RX ADMIN — SODIUM CHLORIDE, PRESERVATIVE FREE 10 ML: 5 INJECTION INTRAVENOUS at 08:37

## 2024-11-26 RX ADMIN — SODIUM CHLORIDE 7.5 MG/HR: 9 INJECTION, SOLUTION INTRAVENOUS at 17:36

## 2024-11-26 RX ADMIN — HYDRALAZINE HYDROCHLORIDE 10 MG: 20 INJECTION INTRAMUSCULAR; INTRAVENOUS at 06:52

## 2024-11-26 RX ADMIN — CHLORHEXIDINE GLUCONATE, 0.12% ORAL RINSE 15 ML: 1.2 SOLUTION DENTAL at 20:43

## 2024-11-26 RX ADMIN — BUDESONIDE 250 MCG: 0.25 INHALANT RESPIRATORY (INHALATION) at 22:12

## 2024-11-26 RX ADMIN — TIOTROPIUM BROMIDE AND OLODATEROL 2 PUFF: 3.124; 2.736 SPRAY, METERED RESPIRATORY (INHALATION) at 09:13

## 2024-11-26 RX ADMIN — HYDRALAZINE HYDROCHLORIDE 10 MG: 20 INJECTION INTRAMUSCULAR; INTRAVENOUS at 03:58

## 2024-11-26 RX ADMIN — SODIUM CHLORIDE: 4.5 INJECTION, SOLUTION INTRAVENOUS at 18:34

## 2024-11-26 RX ADMIN — INSULIN LISPRO 12 UNITS: 100 INJECTION, SOLUTION INTRAVENOUS; SUBCUTANEOUS at 06:31

## 2024-11-26 RX ADMIN — PANTOPRAZOLE SODIUM 40 MG: 40 INJECTION, POWDER, FOR SOLUTION INTRAVENOUS at 08:36

## 2024-11-26 RX ADMIN — HYDROCORTISONE SODIUM SUCCINATE 100 MG: 100 INJECTION, POWDER, FOR SOLUTION INTRAMUSCULAR; INTRAVENOUS at 01:03

## 2024-11-26 RX ADMIN — BUDESONIDE 250 MCG: 0.25 INHALANT RESPIRATORY (INHALATION) at 09:12

## 2024-11-26 RX ADMIN — METOPROLOL TARTRATE 5 MG: 5 INJECTION INTRAVENOUS at 20:42

## 2024-11-26 RX ADMIN — INSULIN GLARGINE 25 UNITS: 100 INJECTION, SOLUTION SUBCUTANEOUS at 08:36

## 2024-11-26 RX ADMIN — SODIUM CHLORIDE 10 MG/HR: 9 INJECTION, SOLUTION INTRAVENOUS at 15:01

## 2024-11-26 RX ADMIN — CEFEPIME 2000 MG: 2 INJECTION, POWDER, FOR SOLUTION INTRAVENOUS at 21:05

## 2024-11-26 RX ADMIN — Medication 100 MCG/HR: at 02:51

## 2024-11-26 RX ADMIN — POTASSIUM PHOSPHATE, MONOBASIC POTASSIUM PHOSPHATE, DIBASIC 15 MMOL: 224; 236 INJECTION, SOLUTION, CONCENTRATE INTRAVENOUS at 10:24

## 2024-11-26 RX ADMIN — CEFEPIME 2000 MG: 2 INJECTION, POWDER, FOR SOLUTION INTRAVENOUS at 08:35

## 2024-11-26 RX ADMIN — HEPARIN SODIUM 13 UNITS/KG/HR: 10000 INJECTION, SOLUTION INTRAVENOUS at 07:33

## 2024-11-26 RX ADMIN — HYDROCORTISONE SODIUM SUCCINATE 100 MG: 100 INJECTION, POWDER, FOR SOLUTION INTRAMUSCULAR; INTRAVENOUS at 08:46

## 2024-11-26 RX ADMIN — HYDRALAZINE HYDROCHLORIDE 10 MG: 20 INJECTION INTRAMUSCULAR; INTRAVENOUS at 01:37

## 2024-11-26 RX ADMIN — SODIUM CHLORIDE 10 MG/HR: 9 INJECTION, SOLUTION INTRAVENOUS at 12:02

## 2024-11-26 RX ADMIN — INSULIN LISPRO 4 UNITS: 100 INJECTION, SOLUTION INTRAVENOUS; SUBCUTANEOUS at 18:41

## 2024-11-26 ASSESSMENT — PAIN SCALES - GENERAL
PAINLEVEL_OUTOF10: 0
PAINLEVEL_OUTOF10: 0

## 2024-11-26 ASSESSMENT — PAIN SCALES - WONG BAKER
WONGBAKER_NUMERICALRESPONSE: NO HURT
WONGBAKER_NUMERICALRESPONSE: NO HURT

## 2024-11-26 ASSESSMENT — PULMONARY FUNCTION TESTS
PIF_VALUE: 15
PIF_VALUE: 16

## 2024-11-26 NOTE — SIGNIFICANT EVENT
Patient initially presented for urosepsis.  Went into acute hypoxic respiratory failure while on the floor and was intubated and admitted to the ICU.  Patient extubated on 11/26.  Patient was extubated on BiPAP, weaned off to nasal cannula 6 L.  Cardiology was consulted due to decrease in EF, recommended reconsultation for LHC.  Patient is stable to be transferred to stepdown, room 4A04.     Electronically signed by Cory Carver DO on 11/26/2024 at 4:34 PM

## 2024-11-26 NOTE — PLAN OF CARE
Problem: Safety - Medical Restraint  Goal: Remains free of injury from restraints (Restraint for Interference with Medical Device)  Description: INTERVENTIONS:  1. Determine that other, less restrictive measures have been tried or would not be effective before applying the restraint  2. Evaluate the patient's condition at the time of restraint application  3. Inform patient/family regarding the reason for restraint  4. Q2H: Monitor safety, psychosocial status, comfort, nutrition and hydration  Outcome: Progressing  Flowsheets (Taken 11/25/2024 2000)  Remains free of injury from restraints (restraint for interference with medical device):   Determine that other, less restrictive measures have been tried or would not be effective before applying the restraint   Evaluate the patient's condition at the time of restraint application   Inform patient/family regarding the reason for restraint   Every 2 hours: Monitor safety, psychosocial status, comfort, nutrition and hydration     Problem: Chronic Conditions and Co-morbidities  Goal: Patient's chronic conditions and co-morbidity symptoms are monitored and maintained or improved  Outcome: Progressing  Flowsheets (Taken 11/24/2024 2028 by Yecenia Tariq RN)  Care Plan - Patient's Chronic Conditions and Co-Morbidity Symptoms are Monitored and Maintained or Improved:   Monitor and assess patient's chronic conditions and comorbid symptoms for stability, deterioration, or improvement   Collaborate with multidisciplinary team to address chronic and comorbid conditions and prevent exacerbation or deterioration   Update acute care plan with appropriate goals if chronic or comorbid symptoms are exacerbated and prevent overall improvement and discharge

## 2024-11-26 NOTE — PROGRESS NOTES
Pt was unresponsive. Her son and son's wife were there. She was blessed and family encouraged.    11/26/24 1424   Encounter Summary   Encounter Overview/Reason Spiritual/Emotional Needs   Service Provided For Patient and family together   Referral/Consult From [facility]   Support System Children   Last Encounter  11/26/24   Complexity of Encounter Low   Begin Time 1115   End Time  1121   Total Time Calculated 6 min   Spiritual/Emotional needs   Type Spiritual Support   Assessment/Intervention/Outcome   Assessment Other (Comment)

## 2024-11-26 NOTE — PROGRESS NOTES
750 Patient hypertensive 212/160 RR 40  fighting ventilator.    0800 - airway cart, Vincent, RT, Dr. Shine and Dr. Salazar at bedside preparing for extubation.    0812 - Dr. Sánchez and FLAQUITO Martinez also at bedside.     0813 - patient extubated with boujie. Patient breathing around the boujie. Boujie removed.     0814 - patient with very weak cough. No verbalization when prompted.

## 2024-11-26 NOTE — PROGRESS NOTES
Intake & Therapies:    Average Meal Intake: NPO     Diet NPO Exceptions are: Sips of Water with Meds  ADULT TUBE FEEDING; Orogastric; Renal Formula; Continuous; 10; Yes; 10; Q 8 hours; 30; 30; Q 4 hours    Anthropometric Measures:  Height: 149.9 cm (4' 11\")  Ideal Body Weight (IBW): 95 lbs (43 kg)    Admission Body Weight: 95.6 kg (210 lb 12 oz) (11/20: generalized non-pitting edema)  Current Body Weight: 97.1 kg (214 lb 1.1 oz) (11/26; non pitting edema),   IBW.    Current BMI (kg/m2): 43.2  Usual Body Weight:  (weight stable PTA per family; per EMR - per office visit notes: 11/29/23: 214#, 1/31/24: 215#, 4/17/24; 212#, 8/26/24: 211#)        Weight Adjustment For: No Adjustment                 BMI Categories: Obese Class 3 (BMI 40.0 or greater)    Estimated Daily Nutrient Needs:  Energy Requirements Based On: Kcal/kg  Weight Used for Energy Requirements:  (97kgm on 11/26)  Energy (kcal/day): 7691-5851 kcals (12-15)  Weight Used for Protein Requirements: Ideal  Protein (g/day): ~65 grams (1.5 grams/kg of IBW) - monitoring renal function  Method Used for Fluid Requirements: Defer to provider  Fluid (ml/day): per provider    Nutrition Diagnosis:   Inadequate oral intake related to impaired respiratory function as evidenced by NPO or clear liquid status due to medical condition    Nutrition Interventions:   Food and/or Nutrient Delivery: Continue NPO (await swallow evaluation when appropriate)  Nutrition Education/Counseling: Education/Counseling initiated (discussesd nutrition plan of care with patient's family at bedside)  Coordination of Nutrition Care: Continue to monitor while inpatient, Interdisciplinary Rounds       Goals:  Goals: Meet at least 75% of estimated needs, by next RD assessment  Type of Goal: New goal       Nutrition Monitoring and Evaluation:      Food/Nutrient Intake Outcomes: Progression of Nutrition  Physical Signs/Symptoms Outcomes: Biochemical Data, GI Status, Fluid Status or Edema, Hemodynamic  Status, Nutrition Focused Physical Findings, Skin, Weight    Discharge Planning:    Too soon to determine     Raiza Salas RD, LD  Contact: [phone]

## 2024-11-26 NOTE — PROGRESS NOTES
[facility]  SPEECH THERAPY MISSED TREATMENT NOTE  [facility] ICU 4D      Date: 2024  Patient Name: Priya Lema        MRN: [id number]    : 1965  (59 y.o.)    REASON FOR MISSED TREATMENT:  Attempted to see patient for completion of clinical swallow evaluation s/p extubation; however, upon arrival to floor, YONNY Almanzar reports patient with ongoing need for BiPAP and not appropriate for ST assessment. ST to f/u later on this date as schedule permits.    Joyce Thompson MA, CCC-SLP [id number]

## 2024-11-26 NOTE — PROGRESS NOTES
CRITICAL CARE PROGRESS NOTE      [facility]    Unit/Bed:4D-09/009-A  YOB: 1965  MRN: [id number]   PCP: Parmjit Tesfaye MD  Date of Admission: 11/20/2024  Chief Complaint:-Pyelonephritis    Assessment and Plan:    Sepsis secondary to left nephrolithiasis complicated with left pyelonephritis w/ severe PNA: CT scan at outside facility showed nephrolithiasis on the left side, and cystic characteristic on the right kidney.  Leukocytosis worsening, 29.3 from 19.5, secondary to steroids. Urine culture positive for E.coli. (11/20). Bclx NGTD. Respiratory panel negative (11/25). UA(11/25) reported no caast, WBC>100, RBC>200. CXR 11/26 shows improvement with right opacity and infiltrates. Mild improvement with left pleural effusion.  Continue on cefepime and completed course for doxycycline.    Urology following, recommendation appreciated.   Continue Solucortef, (11/25-11/30).   Acute on chronic hypoxic/hypercapnic respiratory failure secondary to COPD exacerbation vs PNA: On 2 L oxygen at home.  Intubated on 11/20 for airway protection. CXR 11/26 shows improvement with right opacity and infiltrates. Mild improvement with left pleural effusion.   Currently on fentanyl 75mcg for pain control and sedation, holding Versed due to hypotension.   Daily CXR.   Unable to extubate due to no cuff leak, extubate over a bougie on 11/26, with proper assistance in the room.   Continue on Stiolto 2puffs daily. Albuterol ordered PRN.   Sever Pneumonia secondary to aspiration: CXR 11/26 shows improvement with right opacity and infiltrates. Mild improvement with left pleural effusion. Patient is afebrile, with worsening leukocytosis, due to starting steroids.  Respiratory panel negative, secondary to antibiotic use.   Continuing cefepime.  Completed course for doxycycline.   Continue Solu-Cortef for 5 days.   New onset A-fib with RVR: Currently in NSR. EKG (11/22) A-fib with RVR at 108.  Currently on NSR.  to the floor.  Overnight rapid response was called on 11/20/2024 secondary to decreased mentation, sinus tachycardia in the 160s, and concerns for airway protection.  At bedside patient given 1 L fluid boluses.  Patient was started on nasal cannula and switched to nonrebreather mask, saturation did not improve and patient was intubated and brought to ICU. Patient at bedside, patient just had the cystoscopy and left ureteral stent placement. On 11/21.  Patient is on minimal vent settings, failed due to no cuff leak.  Leukocytosis worsening, urine culture reported gram-negative bacteria, antibiotic switched from Rocephin to cefepime and doxycycline, due to history of COPD. On 11/23: Unable to extubate due to no cuff leak. Urine culture grew E. coli.  Multiple attempts made for extubation but due to no cuff leak, unable to extubate.  Patient started on Solu-Cortef for 5 days.     11/26: Patient seen at bedside    Past Medical History:  hypertension, hyperlipidemia, COPD (on 2 L O2 at baseline), NIDDM 2, CARROLL.  Family History: No family history on chart review..  Social History: Smokes about 0.5 packs/day for the past 40 years.  No reported alcohol/drug usage.    ROS   Unable to obtain patient is intubated.     Scheduled Meds:   insulin glargine  25 Units SubCUTAneous Daily    chlorhexidine  15 mL Mouth/Throat BID    senna  1 tablet Oral Nightly    polyethylene glycol  17 g Oral Daily    metoprolol tartrate  50 mg Oral BID    cefepime  2,000 mg IntraVENous Q12H    docusate  100 mg Oral Daily    insulin lispro  0-16 Units SubCUTAneous 4x Daily AC & HS    pantoprazole  40 mg IntraVENous Daily    budesonide  250 mcg Nebulization BID RT    hydrocortisone sodium succinate PF  100 mg IntraVENous Q8H    sodium chloride flush  5-40 mL IntraVENous 2 times per day    atorvastatin  20 mg Oral Nightly    [Held by provider] lisinopril  40 mg Oral Daily    tiotropium-olodaterol  2 puff Inhalation Daily     Continuous Infusions:

## 2024-11-26 NOTE — PROGRESS NOTES
[facility]  OCCUPATIONAL THERAPY MISSED TREATMENT NOTE  [facility]Z ICU 4D  4D-009-A      Date: 2024  Patient Name: Priya Lema        CSN: [id number]   : 1965  (59 y.o.)  Gender: female   Referring Practitioner: Shireen Murillo MD            REASON FOR MISSED TREATMENT: Hold treatment per nursing request.       Pt just extubated this morning and requiring high oxygen needs still. Will hold OT at this time

## 2024-11-26 NOTE — PROGRESS NOTES
Patient Weaning Progress    The patient's vent settings was not able to be weaned this shift.      Ventilator settings that were weaned              [] Mode   [] Pressure support weaned   [] Fio2 weaned   [] Peep weaned      Spontaneous weaning trial  was attempted.     due to defined parameters for SBT (spontaneous breathing trial) not being met.     *Results of SBT documented under SBTOUTCOME note.    Reason that defined ventilator parameters for SBT was not met              [] Patient condition requires increased ventilator settings  [] Requires increased sedation   [] Settings not within weaning range   [] SAT not completed   [] Physician orders    The patient was able to tolerate SBT.   RSBI  was 22 with EtCO2 of 38 and SpO2 of 91 on 40% FiO2.  Spontanteous VT was 551 and RR 12 breaths/min.     Cuff leak was detected by a leak on the vent. A audible leak was not detected.

## 2024-11-26 NOTE — PROGRESS NOTES
[facility]  PHYSICAL THERAPY MISSED TREATMENT NOTE  [facility] ICU 4D    Date: 2024  Patient Name: Priya Lema        MRN: [id number]   : 1965  (59 y.o.)  Gender: female   Referring Practitioner: Shireen Murillo MD            REASON FOR MISSED TREATMENT:  Hold treatment per nursing request.      Pt just extubated this morning and requiring high oxygen needs still. Will hold PT at this time

## 2024-11-27 ENCOUNTER — APPOINTMENT (OUTPATIENT)
Dept: GENERAL RADIOLOGY | Age: 59
End: 2024-11-27
Attending: STUDENT IN AN ORGANIZED HEALTH CARE EDUCATION/TRAINING PROGRAM
Payer: COMMERCIAL

## 2024-11-27 PROBLEM — I48.0 PAF (PAROXYSMAL ATRIAL FIBRILLATION) (HCC): Status: ACTIVE | Noted: 2024-11-27

## 2024-11-27 PROBLEM — I50.21 ACUTE HFREF (HEART FAILURE WITH REDUCED EJECTION FRACTION) (HCC): Status: ACTIVE | Noted: 2024-11-27

## 2024-11-27 LAB
ALBUMIN SERPL BCG-MCNC: 3.3 G/DL (ref 3.5–5.1)
ALP SERPL-CCNC: 79 U/L (ref 38–126)
ALT SERPL W/O P-5'-P-CCNC: 51 U/L (ref 11–66)
ANION GAP SERPL CALC-SCNC: 10 MEQ/L (ref 8–16)
ANION GAP SERPL CALC-SCNC: 12 MEQ/L (ref 8–16)
AST SERPL-CCNC: 21 U/L (ref 5–40)
BILIRUB SERPL-MCNC: 1.3 MG/DL (ref 0.3–1.2)
BUN SERPL-MCNC: 44 MG/DL (ref 7–22)
BUN SERPL-MCNC: 48 MG/DL (ref 7–22)
CA-I BLD ISE-SCNC: 1.07 MMOL/L (ref 1.12–1.32)
CALCIUM SERPL-MCNC: 7.6 MG/DL (ref 8.5–10.5)
CALCIUM SERPL-MCNC: 8 MG/DL (ref 8.5–10.5)
CHLORIDE SERPL-SCNC: 113 MEQ/L (ref 98–111)
CHLORIDE SERPL-SCNC: 114 MEQ/L (ref 98–111)
CO2 SERPL-SCNC: 28 MEQ/L (ref 23–33)
CO2 SERPL-SCNC: 28 MEQ/L (ref 23–33)
CREAT SERPL-MCNC: 0.9 MG/DL (ref 0.4–1.2)
CREAT SERPL-MCNC: 1 MG/DL (ref 0.4–1.2)
DEPRECATED RDW RBC AUTO: 44.6 FL (ref 35–45)
ERYTHROCYTE [DISTWIDTH] IN BLOOD BY AUTOMATED COUNT: 16.6 % (ref 11.5–14.5)
GFR SERPL CREATININE-BSD FRML MDRD: 64 ML/MIN/1.73M2
GFR SERPL CREATININE-BSD FRML MDRD: 73 ML/MIN/1.73M2
GLUCOSE BLD STRIP.AUTO-MCNC: 178 MG/DL (ref 70–108)
GLUCOSE BLD STRIP.AUTO-MCNC: 183 MG/DL (ref 70–108)
GLUCOSE BLD STRIP.AUTO-MCNC: 198 MG/DL (ref 70–108)
GLUCOSE BLD STRIP.AUTO-MCNC: 220 MG/DL (ref 70–108)
GLUCOSE SERPL-MCNC: 172 MG/DL (ref 70–108)
GLUCOSE SERPL-MCNC: 224 MG/DL (ref 70–108)
HCT VFR BLD AUTO: 31 % (ref 37–47)
HEPARIN UNFRACTIONATED: 0.54 U/ML (ref 0.3–0.7)
HGB BLD-MCNC: 9.9 GM/DL (ref 12–16)
MAGNESIUM SERPL-MCNC: 1.7 MG/DL (ref 1.6–2.4)
MCH RBC QN AUTO: 24.4 PG (ref 26–33)
MCHC RBC AUTO-ENTMCNC: 31.9 GM/DL (ref 32.2–35.5)
MCV RBC AUTO: 76.5 FL (ref 81–99)
PHOSPHATE SERPL-MCNC: 2 MG/DL (ref 2.4–4.7)
PLATELET # BLD AUTO: 240 THOU/MM3 (ref 130–400)
PMV BLD AUTO: 11.7 FL (ref 9.4–12.4)
POTASSIUM SERPL-SCNC: 2.7 MEQ/L (ref 3.5–5.2)
POTASSIUM SERPL-SCNC: 3.5 MEQ/L (ref 3.5–5.2)
PROT SERPL-MCNC: 6 G/DL (ref 6.1–8)
RBC # BLD AUTO: 4.05 MILL/MM3 (ref 4.2–5.4)
SODIUM SERPL-SCNC: 152 MEQ/L (ref 135–145)
SODIUM SERPL-SCNC: 153 MEQ/L (ref 135–145)
WBC # BLD AUTO: 26.5 THOU/MM3 (ref 4.8–10.8)

## 2024-11-27 PROCEDURE — 82330 ASSAY OF CALCIUM: CPT

## 2024-11-27 PROCEDURE — 2580000003 HC RX 258

## 2024-11-27 PROCEDURE — 85027 COMPLETE CBC AUTOMATED: CPT

## 2024-11-27 PROCEDURE — 94640 AIRWAY INHALATION TREATMENT: CPT

## 2024-11-27 PROCEDURE — 82948 REAGENT STRIP/BLOOD GLUCOSE: CPT

## 2024-11-27 PROCEDURE — 83735 ASSAY OF MAGNESIUM: CPT

## 2024-11-27 PROCEDURE — 85520 HEPARIN ASSAY: CPT

## 2024-11-27 PROCEDURE — 2580000003 HC RX 258: Performed by: PHYSICIAN ASSISTANT

## 2024-11-27 PROCEDURE — 6370000000 HC RX 637 (ALT 250 FOR IP): Performed by: INTERNAL MEDICINE

## 2024-11-27 PROCEDURE — 2700000000 HC OXYGEN THERAPY PER DAY

## 2024-11-27 PROCEDURE — 6360000002 HC RX W HCPCS: Performed by: INTERNAL MEDICINE

## 2024-11-27 PROCEDURE — 6370000000 HC RX 637 (ALT 250 FOR IP): Performed by: STUDENT IN AN ORGANIZED HEALTH CARE EDUCATION/TRAINING PROGRAM

## 2024-11-27 PROCEDURE — 92610 EVALUATE SWALLOWING FUNCTION: CPT

## 2024-11-27 PROCEDURE — 94669 MECHANICAL CHEST WALL OSCILL: CPT

## 2024-11-27 PROCEDURE — 76937 US GUIDE VASCULAR ACCESS: CPT

## 2024-11-27 PROCEDURE — 36415 COLL VENOUS BLD VENIPUNCTURE: CPT

## 2024-11-27 PROCEDURE — 2060000000 HC ICU INTERMEDIATE R&B

## 2024-11-27 PROCEDURE — 94761 N-INVAS EAR/PLS OXIMETRY MLT: CPT

## 2024-11-27 PROCEDURE — 6370000000 HC RX 637 (ALT 250 FOR IP): Performed by: NURSE PRACTITIONER

## 2024-11-27 PROCEDURE — 6360000002 HC RX W HCPCS: Performed by: PHYSICIAN ASSISTANT

## 2024-11-27 PROCEDURE — 6370000000 HC RX 637 (ALT 250 FOR IP): Performed by: PHYSICIAN ASSISTANT

## 2024-11-27 PROCEDURE — 6370000000 HC RX 637 (ALT 250 FOR IP)

## 2024-11-27 PROCEDURE — 6360000002 HC RX W HCPCS

## 2024-11-27 PROCEDURE — 2580000003 HC RX 258: Performed by: INTERNAL MEDICINE

## 2024-11-27 PROCEDURE — 71045 X-RAY EXAM CHEST 1 VIEW: CPT

## 2024-11-27 PROCEDURE — 84100 ASSAY OF PHOSPHORUS: CPT

## 2024-11-27 PROCEDURE — 36569 INSJ PICC 5 YR+ W/O IMAGING: CPT

## 2024-11-27 PROCEDURE — 2500000003 HC RX 250 WO HCPCS: Performed by: INTERNAL MEDICINE

## 2024-11-27 PROCEDURE — 99232 SBSQ HOSP IP/OBS MODERATE 35: CPT | Performed by: NURSE PRACTITIONER

## 2024-11-27 PROCEDURE — C1751 CATH, INF, PER/CENT/MIDLINE: HCPCS

## 2024-11-27 PROCEDURE — 99233 SBSQ HOSP IP/OBS HIGH 50: CPT | Performed by: INTERNAL MEDICINE

## 2024-11-27 PROCEDURE — 2500000003 HC RX 250 WO HCPCS: Performed by: STUDENT IN AN ORGANIZED HEALTH CARE EDUCATION/TRAINING PROGRAM

## 2024-11-27 PROCEDURE — 80053 COMPREHEN METABOLIC PANEL: CPT

## 2024-11-27 RX ORDER — ATORVASTATIN CALCIUM 40 MG/1
40 TABLET, FILM COATED ORAL NIGHTLY
Status: DISCONTINUED | OUTPATIENT
Start: 2024-11-27 | End: 2024-11-27

## 2024-11-27 RX ORDER — POTASSIUM CHLORIDE 7.45 MG/ML
10 INJECTION INTRAVENOUS PRN
Status: DISCONTINUED | OUTPATIENT
Start: 2024-11-27 | End: 2024-12-06 | Stop reason: HOSPADM

## 2024-11-27 RX ORDER — POTASSIUM CHLORIDE 29.8 MG/ML
20 INJECTION INTRAVENOUS PRN
Status: DISCONTINUED | OUTPATIENT
Start: 2024-11-27 | End: 2024-12-06 | Stop reason: HOSPADM

## 2024-11-27 RX ORDER — POTASSIUM CHLORIDE 1500 MG/1
40 TABLET, EXTENDED RELEASE ORAL
Status: DISCONTINUED | OUTPATIENT
Start: 2024-11-27 | End: 2024-11-27

## 2024-11-27 RX ORDER — METOPROLOL TARTRATE 25 MG/1
25 TABLET, FILM COATED ORAL 2 TIMES DAILY
Status: DISCONTINUED | OUTPATIENT
Start: 2024-11-27 | End: 2024-12-03

## 2024-11-27 RX ORDER — ASPIRIN 81 MG/1
81 TABLET, CHEWABLE ORAL DAILY
Status: DISCONTINUED | OUTPATIENT
Start: 2024-11-28 | End: 2024-12-05

## 2024-11-27 RX ORDER — METOPROLOL TARTRATE 1 MG/ML
7.5 INJECTION, SOLUTION INTRAVENOUS EVERY 6 HOURS
Status: DISCONTINUED | OUTPATIENT
Start: 2024-11-27 | End: 2024-11-27

## 2024-11-27 RX ORDER — LIDOCAINE HYDROCHLORIDE 10 MG/ML
50 INJECTION, SOLUTION EPIDURAL; INFILTRATION; INTRACAUDAL; PERINEURAL ONCE
Status: DISCONTINUED | OUTPATIENT
Start: 2024-11-27 | End: 2024-12-02

## 2024-11-27 RX ORDER — ATORVASTATIN CALCIUM 40 MG/1
40 TABLET, FILM COATED ORAL NIGHTLY
Status: DISCONTINUED | OUTPATIENT
Start: 2024-11-27 | End: 2024-12-06 | Stop reason: HOSPADM

## 2024-11-27 RX ORDER — POTASSIUM CHLORIDE 1500 MG/1
40 TABLET, EXTENDED RELEASE ORAL PRN
Status: DISCONTINUED | OUTPATIENT
Start: 2024-11-27 | End: 2024-12-06 | Stop reason: HOSPADM

## 2024-11-27 RX ORDER — SENNOSIDES 8.8 MG/5ML
8.8 LIQUID ORAL NIGHTLY
Status: DISCONTINUED | OUTPATIENT
Start: 2024-11-27 | End: 2024-12-06 | Stop reason: HOSPADM

## 2024-11-27 RX ORDER — DEXTROSE MONOHYDRATE 50 MG/ML
INJECTION, SOLUTION INTRAVENOUS CONTINUOUS
Status: DISCONTINUED | OUTPATIENT
Start: 2024-11-27 | End: 2024-11-28

## 2024-11-27 RX ORDER — ASPIRIN 81 MG/1
81 TABLET, CHEWABLE ORAL DAILY
Status: DISCONTINUED | OUTPATIENT
Start: 2024-11-27 | End: 2024-11-27

## 2024-11-27 RX ADMIN — ATORVASTATIN CALCIUM 40 MG: 40 TABLET, FILM COATED ORAL at 21:02

## 2024-11-27 RX ADMIN — POTASSIUM BICARBONATE 40 MEQ: 782 TABLET, EFFERVESCENT ORAL at 21:02

## 2024-11-27 RX ADMIN — METOPROLOL TARTRATE 25 MG: 25 TABLET, FILM COATED ORAL at 21:02

## 2024-11-27 RX ADMIN — HYDROXYZINE HYDROCHLORIDE 10 MG: 10 TABLET ORAL at 16:06

## 2024-11-27 RX ADMIN — CEFEPIME 2000 MG: 2 INJECTION, POWDER, FOR SOLUTION INTRAVENOUS at 07:56

## 2024-11-27 RX ADMIN — POTASSIUM CHLORIDE 20 MEQ: 29.8 INJECTION, SOLUTION INTRAVENOUS at 08:03

## 2024-11-27 RX ADMIN — POTASSIUM CHLORIDE 20 MEQ: 29.8 INJECTION, SOLUTION INTRAVENOUS at 09:45

## 2024-11-27 RX ADMIN — ASPIRIN 81 MG: 81 TABLET, CHEWABLE ORAL at 16:06

## 2024-11-27 RX ADMIN — DEXTROSE MONOHYDRATE: 50 INJECTION, SOLUTION INTRAVENOUS at 08:08

## 2024-11-27 RX ADMIN — POTASSIUM PHOSPHATE, MONOBASIC POTASSIUM PHOSPHATE, DIBASIC 30 MMOL: 224; 236 INJECTION, SOLUTION, CONCENTRATE INTRAVENOUS at 08:53

## 2024-11-27 RX ADMIN — CEFEPIME 2000 MG: 2 INJECTION, POWDER, FOR SOLUTION INTRAVENOUS at 21:04

## 2024-11-27 RX ADMIN — SODIUM CHLORIDE 2.5 MG/HR: 9 INJECTION, SOLUTION INTRAVENOUS at 06:17

## 2024-11-27 RX ADMIN — SODIUM CHLORIDE 2.5 MG/HR: 9 INJECTION, SOLUTION INTRAVENOUS at 00:05

## 2024-11-27 RX ADMIN — CHLORHEXIDINE GLUCONATE, 0.12% ORAL RINSE 15 ML: 1.2 SOLUTION DENTAL at 21:02

## 2024-11-27 RX ADMIN — INSULIN GLARGINE 30 UNITS: 100 INJECTION, SOLUTION SUBCUTANEOUS at 08:53

## 2024-11-27 RX ADMIN — POTASSIUM CHLORIDE 20 MEQ: 29.8 INJECTION, SOLUTION INTRAVENOUS at 08:50

## 2024-11-27 RX ADMIN — METOPROLOL TARTRATE 5 MG: 5 INJECTION INTRAVENOUS at 11:21

## 2024-11-27 RX ADMIN — ONDANSETRON 4 MG: 2 INJECTION INTRAMUSCULAR; INTRAVENOUS at 19:26

## 2024-11-27 RX ADMIN — BUDESONIDE 250 MCG: 0.25 INHALANT RESPIRATORY (INHALATION) at 09:30

## 2024-11-27 RX ADMIN — PANTOPRAZOLE SODIUM 40 MG: 40 INJECTION, POWDER, FOR SOLUTION INTRAVENOUS at 07:52

## 2024-11-27 RX ADMIN — METOPROLOL TARTRATE 5 MG: 5 INJECTION INTRAVENOUS at 00:40

## 2024-11-27 RX ADMIN — TIOTROPIUM BROMIDE AND OLODATEROL 2 PUFF: 3.124; 2.736 SPRAY, METERED RESPIRATORY (INHALATION) at 09:28

## 2024-11-27 RX ADMIN — INSULIN LISPRO 4 UNITS: 100 INJECTION, SOLUTION INTRAVENOUS; SUBCUTANEOUS at 21:02

## 2024-11-27 RX ADMIN — METOPROLOL TARTRATE 5 MG: 5 INJECTION INTRAVENOUS at 05:32

## 2024-11-27 RX ADMIN — HEPARIN SODIUM 11 UNITS/KG/HR: 10000 INJECTION, SOLUTION INTRAVENOUS at 06:15

## 2024-11-27 RX ADMIN — BUDESONIDE 250 MCG: 0.25 INHALANT RESPIRATORY (INHALATION) at 20:13

## 2024-11-27 RX ADMIN — SODIUM CHLORIDE, PRESERVATIVE FREE 10 ML: 5 INJECTION INTRAVENOUS at 07:52

## 2024-11-27 RX ADMIN — INSULIN LISPRO 4 UNITS: 100 INJECTION, SOLUTION INTRAVENOUS; SUBCUTANEOUS at 14:17

## 2024-11-27 ASSESSMENT — PAIN SCALES - WONG BAKER

## 2024-11-27 ASSESSMENT — PAIN SCALES - GENERAL
PAINLEVEL_OUTOF10: 0

## 2024-11-27 NOTE — PROGRESS NOTES
PICC Procedure Note    Priya Lema   Admitted- 11/20/2024  4:47 PM  Admission diagnosis- Pyelonephritis [N12]  Sepsis (HCC) [A41.9]      Attending Physician- Rupesh Gallagher DO  Ordering Physician-same  Indication for Insertion: Poor Vascular Access    Catheter Insertion Date- 11/27/2024   Lot Number- [id number]  Gauge-4  Lumen-dual    Insertion Site- GIOVANNA Basilic  Vein Circumference- 1.03  cm  Catheter Length- 42 cm  Internal Length- 42 cm  Exposed Catheter Length- 0cm   Upper Arm Circumference- 31cm  Tip Confirmation System Bundle met- No:   If X-ray required, Tip Location- [place]  Radiologist- Dr. Stubbs     PICC insertion successful- Yes  Ultrasound- yes    Okay To Use PICC- Yes    Electronically signed by Dawna Ramirez, RN, RN on 11/27/2024 at 2:23 PM

## 2024-11-27 NOTE — PLAN OF CARE
Problem: Discharge Planning  Goal: Discharge to home or other facility with appropriate resources  Outcome: Progressing  Discharge to home or other facility with appropriate resources:   Identify barriers to discharge with patient and caregiver   Identify discharge learning needs (meds, wound care, etc)   Arrange for needed discharge resources and transportation as appropriate   Refer to discharge planning if patient needs post-hospital services based on physician order or complex needs related to functional status, cognitive ability or social support system     Problem: Pain  Goal: Verbalizes/displays adequate comfort level or baseline comfort level  Outcome: Progressing  Verbalizes/displays adequate comfort level or baseline comfort level:   Encourage patient to monitor pain and request assistance   Administer analgesics based on type and severity of pain and evaluate response   Consider cultural and social influences on pain and pain management   Assess pain using appropriate pain scale   Implement non-pharmacological measures as appropriate and evaluate response   Notify Licensed Independent Practitioner if interventions unsuccessful or patient reports new pain     Problem: Neurosensory - Adult  Goal: Achieves stable or improved neurological status  Outcome: Progressing  Achieves stable or improved neurological status: Assess for and report changes in neurological status     Problem: Neurosensory - Adult  Goal: Achieves maximal functionality and self care  Outcome: Progressing  Achieves maximal functionality and self care: Monitor swallowing and airway patency with patient fatigue and changes in neurological status     Problem: Respiratory - Adult  Goal: Achieves optimal ventilation and oxygenation  Outcome: Progressing  Achieves optimal ventilation and oxygenation:   Assess for changes in respiratory status   Assess for changes in mentation and behavior   Oxygen supplementation based on oxygen saturation or  nutrition, hydration, elimination, sleep and activity  6. If unable to ensure safety without constant attention obtain sitter and review sitter guidelines with assigned personnel  7. Initiate Psychosocial CNS and Spiritual Care consult, as indicated  Outcome: Progressing  Effect of thought disturbance (confusion, delirium, dementia, or psychosis) are managed with adequate functional status:   Assess for contributors to thought disturbance, including medications, impaired vision or hearing, underlying metabolic abnormalities, dehydration, psychiatric diagnoses, notify LIP   [place] high risk fall precautions, as indicated     Problem: Nutrition Deficit:  Goal: Optimize nutritional status  Outcome: Progressing  Nutrient intake appropriate for improving, restoring, or maintaining nutritional needs:   Assess nutritional status and recommend course of action   Monitor oral intake, labs, and treatment plans   Care plan reviewed with patient.  Patient verbalizes understanding of the plan of care and contributed to goal setting.

## 2024-11-27 NOTE — PROGRESS NOTES
[facility]  SPEECH THERAPY  [facility] NEUROSCIENCES 4A  Clinical Swallow Evaluation    Discharge Recommendations: Continue to Assess Pending Progress  DIET ORDER RECOMMENDATIONS AFTER EVALUATION: NPO+NGT  Strategies:  Recommend NPO     SLP Individual Minutes  Time In: 1203  Time Out: 1215  Minutes: 12  Timed Code Treatment Minutes: 0 Minutes       Date: 2024  Patient Name: Priya Lema      CSN: [id number]   : 1965  (59 y.o.)  Gender: female   Referring Physician:  Rupesh Gallagher DO     Diagnosis: Pyelonephritis    History of Present Illness/Injury: 59-year-old female with past medical history of hypertension, hyperlipidemia, COPD (on 2 L O2 at baseline) presented to [facility] from outside facility secondary to sepsis with left-sided pyelonephritis due to left-sided nephrolithiasis. Patient presented to outside facility with left back pain/flank pain. Denied any urinary symptoms. CT A/P reported nephrolithiasis. Patient was transferred to [facility] for urological evaluation. Patient was admitted to the floor. Overnight rapid response was called on 2024 secondary to decreased mentation, sinus tachycardia in the 160s, and concerns for airway protection. At bedside patient given 1 L fluid boluses. Patient was started on nasal cannula and switched to nonrebreather mask, saturation did not improve and patient was intubated and brought to ICU. Patient at bedside, patient just had the cystoscopy and left ureteral stent placement. On . Patient is on minimal vent settings, failed due to no cuff leak. Leukocytosis worsening, urine culture reported gram-negative bacteria, antibiotic switched from Rocephin to cefepime and doxycycline, due to history of COPD. On : Unable to extubate due to no cuff leak. Urine culture grew E. coli. Multiple attempts made for extubation but due to no cuff leak, unable to extubate. Patient started on Solu-Cortef for 5 days   Past Medical History:   Diagnosis Date

## 2024-11-27 NOTE — PROGRESS NOTES
Order received for CVC removal per Dr. Gallagher .  Patient placed in bed.  Transparent dressing removed. Skin accessed appears clean and dry.  Sutures removed, area cleaned with chloro prep, bio patch applied, sterile gauze placed over bio patch, CVC removed with green tip intact, pressure held with sterile gauze for 3 minutes.  New transparent dressing applied.  Educated the patient on remaining in bed for one hour, dressing stays on for 24 hours, signs and symptoms of infection and notify primary nurse if any blood or oozing.   Keerthi BLANCAS notified.

## 2024-11-27 NOTE — PLAN OF CARE
Problem: Discharge Planning  Goal: Discharge to home or other facility with appropriate resources  Outcome: Progressing  Flowsheets (Taken 11/27/2024 0624)  Discharge to home or other facility with appropriate resources:   Identify barriers to discharge with patient and caregiver   Identify discharge learning needs (meds, wound care, etc)   Arrange for needed discharge resources and transportation as appropriate   Refer to discharge planning if patient needs post-hospital services based on physician order or complex needs related to functional status, cognitive ability or social support system     Problem: Pain  Goal: Verbalizes/displays adequate comfort level or baseline comfort level  Outcome: Progressing  Flowsheets (Taken 11/27/2024 0624)  Verbalizes/displays adequate comfort level or baseline comfort level:   Encourage patient to monitor pain and request assistance   Administer analgesics based on type and severity of pain and evaluate response   Consider cultural and social influences on pain and pain management   Assess pain using appropriate pain scale   Implement non-pharmacological measures as appropriate and evaluate response   Notify Licensed Independent Practitioner if interventions unsuccessful or patient reports new pain     Problem: Neurosensory - Adult  Goal: Achieves stable or improved neurological status  Outcome: Progressing  Flowsheets (Taken 11/27/2024 0624)  Achieves stable or improved neurological status: Assess for and report changes in neurological status     Problem: Neurosensory - Adult  Goal: Achieves maximal functionality and self care  Outcome: Progressing  Flowsheets (Taken 11/27/2024 0624)  Achieves maximal functionality and self care: Monitor swallowing and airway patency with patient fatigue and changes in neurological status     Problem: Respiratory - Adult  Goal: Achieves optimal ventilation and oxygenation  11/27/2024 0624 by Petra Urrutia, RN  Outcome:  Care plan reviewed with patient.  Patient verbalizes understanding of the plan of care and contributed to goal setting.

## 2024-11-27 NOTE — PROGRESS NOTES
Comprehensive Nutrition Assessment    Type and Reason for Visit:  Reassess (NPO-SLP following)    Nutrition Recommendations/Plan:   If NGT placed for tubefeedings due to continued NPO status would recommend Two Eladio HN at 10 ml/hr, increase 10 ml/hr every 4 hours as tolerated to goal of 30 ml/hr (~ 1440 kcals, 60 grams protein, 157 grams CHO, 4 grams fiber, 504 mls water in 720 mls volume per 24 hours).  Additional free water flush per Provider.   SLP following for diet recommendations.      Malnutrition Assessment:  Malnutrition Status:  At risk for malnutrition (11/26/24 1504)    Context:  Acute Illness     Findings of the 6 clinical characteristics of malnutrition:  Energy Intake:  Mild decrease in energy intake (received tube feed while intubated, currently NPO pending swallow evaluation when appropriate; family reports patient with good appetite/ intake PTA)  Weight Loss:   (weight appears stable per office visit weights in EMR)     Body Fat Loss:  No body fat loss     Muscle Mass Loss:  No muscle mass loss    Fluid Accumulation:  Mild Generalized   Strength:  Not Performed    Nutrition Assessment:      Pt. nutritionally compromised AEB NPO d/t dysphagia/cognitive status.   At risk for further nutrition compromise r/t sepsis 2/2 pyelonephritis, s/p cystoscopy - L ureteral stent insertion (11/21), pneumonia, RAINE, heart failure, intubation 11/20-11/26, and underlying medical condition (PMHx: HTN, COPD, T2DM with A1C (11/23/24) 7.9%, CARROLL, COPD, chronic tobacco abuse).        Nutrition Related Findings:    Pt. Report/Treatments/Miscellaneous: Patient seen, she was awake but nonverbal when writer spoke to her. No family present.  Spoke with RN, she had concerns that patient may not pass swallow eval with SLP.  Pt was weaned off bipap, on 3 liters of oxygen.   GI Status: BM 11/27/24  Pertinent Labs: 11/27/24: Sodium 153, Potassium 2.7, BUN 48, Creatinine 1, Mg 1.7, Glucose 172, Chemstick 198, Phos 2, total

## 2024-11-27 NOTE — PROGRESS NOTES
[facility]  OCCUPATIONAL THERAPY MISSED TREATMENT NOTE  [facility] NEUROSCIENCES 4A  4A-04004-A      Date: 2024  Patient Name: Priya Lema        CSN: [id number]   : 1965  (59 y.o.)  Gender: female   Referring Practitioner: Shireen Murillo MD            REASON FOR MISSED TREATMENT:  Attempt x 1, Pt eating, attempt x 2-Pt having PICC line placed. Will check back as time allows.

## 2024-11-27 NOTE — PROGRESS NOTES
Hospitalist Progress Note      [facility]    [facility]4A-04/004-A  YOB: 1965  MRN: [id number]   Acct: [id number]   PCP: Parmjit Tesfaye MD  Date of Admission: 11/20/2024    Assessment/Plan:    Sepsis secondary to left nephrolithiasis complicated by left pyelonephritis  Septic shock - resolved  CT scan at outside facility showed nephrolithiasis on the left side, and cystic characteristic on the right kidney.  Leukocytosis worsening, 29.3 from 19.5, secondary to steroids. Urine culture positive for E.coli. (11/20). Bclx NGTD. Respiratory panel negative (11/25). UA(11/25) reported no caast, WBC>100, RBC>200. Admitted to the ICU on pressors. Has been successfully weaned off. On cefepime.   Patient currently hemodynamically stable off pressors. Downtrending WBC  Reviewed urine culture from 11/20 - pansensitive E coli. Patient was on CTX from 11/20 - 11/20 but escalated to cefepime due to clinical decline.   At this time, continue cefepime. Monitor clinical status. If stable, can attempt  deescalation to CTX 11/28/24  Had been followed by urology - s/p cystoscopy with left ureteral stent on 11/21/24.   Maintain montague with prn hand irrigation  Will need to touch base with urology for clearance regarding potential DAPT  Will need definitive stone mgmt in few weeks    Acute on chronic hypoxic hypercapnic respiratory failure in setting of aspiration pneumonia  On 2 L oxygen at home.  Intubated on 11/20 for airway protection. CXR 11/26 shows improvement with right opacity and infiltrates. Mild improvement with left pleural effusion.   Noted to be hypoxic overnight, on 6L. Weaned down to 2-3L, tolerating  CXR ordered, showing bilateral atelectasis vs infiltrate  Previously intubated, ETT culture and RVP obtained  RVP negative  ETT culture with yeast. Likely colonization. No evidence of candida elsewhere, will not    reconstructions, and/or weight-based dosing   when appropriate to reduce radiation to a low as reasonably achievable.      XR CHEST PORTABLE   Final Result   1. Improved aeration involving the bilateral lung bases with some residual    opacities of the medial right lung base and within the retrocardiac region.      This document has been electronically signed by: Vincent Miller DO on    11/26/2024 02:14 AM      XR CHEST PORTABLE   Final Result   Persistent under aeration versus infiltrate in the left lung base   and medial right lung base.               **This report has been created using voice recognition software. It may contain   minor errors which are inherent in voice recognition technology.**      Electronically signed by Dr. Rossi German      XR CHEST PORTABLE   Final Result      1. Medical devices as described.   2. Cardiomegaly with mild pulmonary vascular congestion.            **This report has been created using voice recognition software. It may contain   minor errors which are inherent in voice recognition technology.**      Electronically signed by Dr James Chowdary      XR CHEST PORTABLE   Final Result   1. Improved aeration throughout the bilateral lungs. Mild residual    interstitial thickening and interstitial opacities greatest within the    retrocardiac region and within the right lung base.      This document has been electronically signed by: Vincent Miller DO on    11/23/2024 03:25 AM      US LIVER   Final Result   1. Normal liver ultrasound.   2. Probable right renal cyst.            **This report has been created using voice recognition software. It may contain   minor errors which are inherent in voice recognition technology.**         Electronically signed by Dr. Jamir Stubbs      XR ABDOMEN (KUB) (SINGLE AP VIEW)   Final Result   Kidney stone left side. Double-J stent in good position.            **This report has been created using voice recognition software.  It may contain

## 2024-11-27 NOTE — PROGRESS NOTES
(11/24/24 0413)    dextrose       potassium chloride, 20 mEq, PRN   Or  potassium chloride, 10 mEq, PRN  hydrALAZINE, 10 mg, Q4H PRN  hydrOXYzine HCl, 10 mg, TID PRN  heparin (porcine), 4,000 Units, PRN  heparin (porcine), 2,000 Units, PRN  albuterol sulfate HFA, 2 puff, PRN  sodium chloride flush, 5-40 mL, PRN  sodium chloride, , PRN  ondansetron, 4 mg, Q8H PRN   Or  ondansetron, 4 mg, Q6H PRN  polyethylene glycol, 17 g, Daily PRN  acetaminophen, 650 mg, Q6H PRN   Or  acetaminophen, 650 mg, Q6H PRN  glucose, 4 tablet, PRN  dextrose bolus, 125 mL, PRN   Or  dextrose bolus, 250 mL, PRN  glucagon (rDNA), 1 mg, PRN  dextrose, , Continuous PRN  albuterol, 2.5 mg, Q6H PRN        Diagnostics:    Echo: 11/21/24  Left Ventricle Moderately reduced left ventricular systolic function with a visually estimated EF of 30 - 35%. Left ventricle size is normal. Normal wall thickness. Moderate global hypokinesis present. Normal diastolic function.   Left Atrium Left atrium size is normal.   Right Ventricle Right ventricle size is normal. Normal systolic function.   Right Atrium Right atrium size is normal.   Aortic Valve Not well visualized. Mildly thickened cusps. Mildly calcified cusps. No regurgitation. No stenosis.   Mitral Valve Valve structure is normal. Trace regurgitation. No stenosis noted.   Tricuspid Valve Not well visualized. Valve structure is normal. Trace regurgitation. No stenosis noted.   Pulmonic Valve The pulmonic valve visualization is suboptimal but appears to be functioning normally. Physiologically normal regurgitation. No stenosis noted.   Aorta Normal sized aortic root and ascending aorta.   IVC/Hepatic Veins IVC diameter is less than or equal to 21 mm and decreases greater than 50% during inspiration; therefore the estimated right atrial pressure is normal (~3 mmHg). IVC size is normal.   Pericardium No pericardial effusion.         [place] 12/19/2023  Left ventricle:     - The cavity size was normal.  failure - intubated - now extubated     COPD exacerbation     UTI (E Coli) / pyelonephritis / kidney stone sepsis    -s/p left ureteral stent on 11/21/2024       New CDMP EF 30-35% (was 50-55%)  - Likely infectious related     HTN - uncontrolled     DM II  -A1c 7.9    COPD / CARROLL   Tobacco abuse       Plan:  At present - no cath planned - maybe next week once mental status recovers \ Still with mild RAINE / hemturia  She will need cleared for DAPT from urology   WBC elevated - will need to be significantly decreased before proceeding with Cath (she did get steroids)  Continue BB/statin/ heparin - add asa   Lipid panel pending   Follow        CHF guidelines:  BB: yes  ACE / ARB/ entresto:no RAINE  needs   Diuretics:no  Aldactone: no   Farxiga: check with insurance  needs  Abel candidate: no     Electronically signed by YOHANNES Fofana CNP on 11/27/2024 at 11:33 AM

## 2024-11-27 NOTE — PLAN OF CARE
Patient seen and evaluated. Continue nicardipine gtt, titrate up since BP low. Started on heparin gtt yesterday, montague in draining bright red urine, follow up hb.     Patient is not communicating, baseline she is independent and takes care of herself, works and drives per son and daughter in law. Responds somewhat to commands, her comprehension is questionable. Fentanyl gtt dc'd this am and versed offf since yesterday. Mental status could be medication induced and/or ICU delirium/myopathy but reasonable to obtain CTH given severly elevated blood pressures of 200s systolic. CT head ordered, follow up results.     Increased lantus 25 units to 30 units and continue high ISS for uncontrolled hyperglycemia.

## 2024-11-27 NOTE — PROGRESS NOTES
[facility]  PHYSICAL THERAPY MISSED TREATMENT NOTE  [facility]    Date: 2024  Patient Name: Priya Lema        MRN: [id number]   : 1965  (59 y.o.)  Gender: female   Referring Practitioner: Shireen Murillo MD            REASON FOR MISSED TREATMENT:  Hold treatment per nursing request.      Hold per RN as she just got a PICC line placed. Will try back as able

## 2024-11-28 ENCOUNTER — APPOINTMENT (OUTPATIENT)
Dept: GENERAL RADIOLOGY | Age: 59
End: 2024-11-28
Attending: STUDENT IN AN ORGANIZED HEALTH CARE EDUCATION/TRAINING PROGRAM
Payer: COMMERCIAL

## 2024-11-28 LAB
ALBUMIN SERPL BCG-MCNC: 3.1 G/DL (ref 3.5–5.1)
ALP SERPL-CCNC: 79 U/L (ref 38–126)
ALT SERPL W/O P-5'-P-CCNC: 48 U/L (ref 11–66)
ANION GAP SERPL CALC-SCNC: 11 MEQ/L (ref 8–16)
AST SERPL-CCNC: 26 U/L (ref 5–40)
BILIRUB SERPL-MCNC: 0.9 MG/DL (ref 0.3–1.2)
BUN SERPL-MCNC: 43 MG/DL (ref 7–22)
CA-I BLD ISE-SCNC: 1.02 MMOL/L (ref 1.12–1.32)
CALCIUM SERPL-MCNC: 7.3 MG/DL (ref 8.5–10.5)
CHLORIDE SERPL-SCNC: 113 MEQ/L (ref 98–111)
CHOLEST SERPL-MCNC: 113 MG/DL (ref 100–199)
CO2 SERPL-SCNC: 28 MEQ/L (ref 23–33)
CREAT SERPL-MCNC: 1.1 MG/DL (ref 0.4–1.2)
DEPRECATED RDW RBC AUTO: 47.4 FL (ref 35–45)
EKG ATRIAL RATE: 86 BPM
EKG P AXIS: 45 DEGREES
EKG P-R INTERVAL: 110 MS
EKG Q-T INTERVAL: 344 MS
EKG QRS DURATION: 84 MS
EKG QTC CALCULATION (BAZETT): 411 MS
EKG R AXIS: 12 DEGREES
EKG T AXIS: 120 DEGREES
EKG VENTRICULAR RATE: 86 BPM
ERYTHROCYTE [DISTWIDTH] IN BLOOD BY AUTOMATED COUNT: 17.7 % (ref 11.5–14.5)
GFR SERPL CREATININE-BSD FRML MDRD: 57 ML/MIN/1.73M2
GLUCOSE BLD STRIP.AUTO-MCNC: 152 MG/DL (ref 70–108)
GLUCOSE BLD STRIP.AUTO-MCNC: 158 MG/DL (ref 70–108)
GLUCOSE BLD STRIP.AUTO-MCNC: 180 MG/DL (ref 70–108)
GLUCOSE BLD STRIP.AUTO-MCNC: 276 MG/DL (ref 70–108)
GLUCOSE SERPL-MCNC: 221 MG/DL (ref 70–108)
HCT VFR BLD AUTO: 30.4 % (ref 37–47)
HDLC SERPL-MCNC: 31 MG/DL
HEPARIN UNFRACTIONATED: 0.5 U/ML (ref 0.3–0.7)
HGB BLD-MCNC: 9.6 GM/DL (ref 12–16)
LDLC SERPL CALC-MCNC: 35 MG/DL
MAGNESIUM SERPL-MCNC: 1.6 MG/DL (ref 1.6–2.4)
MCH RBC QN AUTO: 25.1 PG (ref 26–33)
MCHC RBC AUTO-ENTMCNC: 31.6 GM/DL (ref 32.2–35.5)
MCV RBC AUTO: 79.6 FL (ref 81–99)
PHOSPHATE SERPL-MCNC: 3.4 MG/DL (ref 2.4–4.7)
PLATELET # BLD AUTO: 206 THOU/MM3 (ref 130–400)
PMV BLD AUTO: 11.3 FL (ref 9.4–12.4)
POTASSIUM SERPL-SCNC: 4.1 MEQ/L (ref 3.5–5.2)
PROT SERPL-MCNC: 5.5 G/DL (ref 6.1–8)
RBC # BLD AUTO: 3.82 MILL/MM3 (ref 4.2–5.4)
SODIUM SERPL-SCNC: 152 MEQ/L (ref 135–145)
TRIGL SERPL-MCNC: 236 MG/DL (ref 0–199)
WBC # BLD AUTO: 28.4 THOU/MM3 (ref 4.8–10.8)

## 2024-11-28 PROCEDURE — 93010 ELECTROCARDIOGRAM REPORT: CPT | Performed by: INTERNAL MEDICINE

## 2024-11-28 PROCEDURE — 2580000003 HC RX 258

## 2024-11-28 PROCEDURE — 99232 SBSQ HOSP IP/OBS MODERATE 35: CPT | Performed by: INTERNAL MEDICINE

## 2024-11-28 PROCEDURE — 84100 ASSAY OF PHOSPHORUS: CPT

## 2024-11-28 PROCEDURE — 6360000002 HC RX W HCPCS

## 2024-11-28 PROCEDURE — 82330 ASSAY OF CALCIUM: CPT

## 2024-11-28 PROCEDURE — 2700000000 HC OXYGEN THERAPY PER DAY

## 2024-11-28 PROCEDURE — 94640 AIRWAY INHALATION TREATMENT: CPT

## 2024-11-28 PROCEDURE — 6370000000 HC RX 637 (ALT 250 FOR IP): Performed by: INTERNAL MEDICINE

## 2024-11-28 PROCEDURE — 82948 REAGENT STRIP/BLOOD GLUCOSE: CPT

## 2024-11-28 PROCEDURE — 93005 ELECTROCARDIOGRAM TRACING: CPT | Performed by: INTERNAL MEDICINE

## 2024-11-28 PROCEDURE — 94761 N-INVAS EAR/PLS OXIMETRY MLT: CPT

## 2024-11-28 PROCEDURE — 36415 COLL VENOUS BLD VENIPUNCTURE: CPT

## 2024-11-28 PROCEDURE — 85027 COMPLETE CBC AUTOMATED: CPT

## 2024-11-28 PROCEDURE — 6370000000 HC RX 637 (ALT 250 FOR IP)

## 2024-11-28 PROCEDURE — 85520 HEPARIN ASSAY: CPT

## 2024-11-28 PROCEDURE — 80053 COMPREHEN METABOLIC PANEL: CPT

## 2024-11-28 PROCEDURE — 2580000003 HC RX 258: Performed by: INTERNAL MEDICINE

## 2024-11-28 PROCEDURE — 6370000000 HC RX 637 (ALT 250 FOR IP): Performed by: PHYSICIAN ASSISTANT

## 2024-11-28 PROCEDURE — 71045 X-RAY EXAM CHEST 1 VIEW: CPT

## 2024-11-28 PROCEDURE — 94660 CPAP INITIATION&MGMT: CPT

## 2024-11-28 PROCEDURE — 2060000000 HC ICU INTERMEDIATE R&B

## 2024-11-28 PROCEDURE — 2580000003 HC RX 258: Performed by: PHYSICIAN ASSISTANT

## 2024-11-28 PROCEDURE — 6360000002 HC RX W HCPCS: Performed by: UROLOGY

## 2024-11-28 PROCEDURE — 94669 MECHANICAL CHEST WALL OSCILL: CPT

## 2024-11-28 PROCEDURE — 80061 LIPID PANEL: CPT

## 2024-11-28 PROCEDURE — 83735 ASSAY OF MAGNESIUM: CPT

## 2024-11-28 RX ORDER — ALBUTEROL SULFATE 0.83 MG/ML
2.5 SOLUTION RESPIRATORY (INHALATION) 2 TIMES DAILY
Status: DISCONTINUED | OUTPATIENT
Start: 2024-11-28 | End: 2024-11-30

## 2024-11-28 RX ORDER — DIPHENHYDRAMINE HCL 12.5MG/5ML
25 LIQUID (ML) ORAL ONCE
Status: DISCONTINUED | OUTPATIENT
Start: 2024-11-28 | End: 2024-11-28

## 2024-11-28 RX ORDER — METRONIDAZOLE 500 MG/1
500 TABLET ORAL EVERY 8 HOURS SCHEDULED
Status: DISCONTINUED | OUTPATIENT
Start: 2024-11-28 | End: 2024-12-03

## 2024-11-28 RX ORDER — INSULIN GLARGINE 100 [IU]/ML
35 INJECTION, SOLUTION SUBCUTANEOUS DAILY
Status: DISCONTINUED | OUTPATIENT
Start: 2024-11-28 | End: 2024-11-30

## 2024-11-28 RX ORDER — DIPHENHYDRAMINE HCL 12.5MG/5ML
25 LIQUID (ML) ORAL ONCE
Status: COMPLETED | OUTPATIENT
Start: 2024-11-28 | End: 2024-11-28

## 2024-11-28 RX ADMIN — DIPHENHYDRAMINE HYDROCHLORIDE 25 MG: 12.5 SOLUTION ORAL at 12:44

## 2024-11-28 RX ADMIN — ALBUTEROL SULFATE 2.5 MG: 2.5 SOLUTION RESPIRATORY (INHALATION) at 20:28

## 2024-11-28 RX ADMIN — ALBUTEROL SULFATE 2.5 MG: 2.5 SOLUTION RESPIRATORY (INHALATION) at 08:41

## 2024-11-28 RX ADMIN — ASPIRIN 81 MG: 81 TABLET, CHEWABLE ORAL at 08:08

## 2024-11-28 RX ADMIN — HYDROXYZINE HYDROCHLORIDE 10 MG: 10 TABLET ORAL at 20:48

## 2024-11-28 RX ADMIN — DEXTROSE MONOHYDRATE: 50 INJECTION, SOLUTION INTRAVENOUS at 03:29

## 2024-11-28 RX ADMIN — CHLORHEXIDINE GLUCONATE, 0.12% ORAL RINSE 15 ML: 1.2 SOLUTION DENTAL at 08:06

## 2024-11-28 RX ADMIN — TIOTROPIUM BROMIDE AND OLODATEROL 2 PUFF: 3.124; 2.736 SPRAY, METERED RESPIRATORY (INHALATION) at 08:39

## 2024-11-28 RX ADMIN — ATORVASTATIN CALCIUM 40 MG: 40 TABLET, FILM COATED ORAL at 20:39

## 2024-11-28 RX ADMIN — SODIUM CHLORIDE, PRESERVATIVE FREE 10 ML: 5 INJECTION INTRAVENOUS at 20:39

## 2024-11-28 RX ADMIN — ACETAMINOPHEN 650 MG: 325 TABLET ORAL at 03:30

## 2024-11-28 RX ADMIN — METRONIDAZOLE 500 MG: 500 TABLET ORAL at 20:39

## 2024-11-28 RX ADMIN — CEFEPIME 2000 MG: 2 INJECTION, POWDER, FOR SOLUTION INTRAVENOUS at 08:19

## 2024-11-28 RX ADMIN — BUDESONIDE 250 MCG: 0.25 INHALANT RESPIRATORY (INHALATION) at 08:39

## 2024-11-28 RX ADMIN — CEFEPIME 2000 MG: 2 INJECTION, POWDER, FOR SOLUTION INTRAVENOUS at 20:36

## 2024-11-28 RX ADMIN — BUDESONIDE 250 MCG: 0.25 INHALANT RESPIRATORY (INHALATION) at 20:34

## 2024-11-28 RX ADMIN — METRONIDAZOLE 500 MG: 500 TABLET ORAL at 14:19

## 2024-11-28 RX ADMIN — METOPROLOL TARTRATE 25 MG: 25 TABLET, FILM COATED ORAL at 08:13

## 2024-11-28 RX ADMIN — INSULIN LISPRO 4 UNITS: 100 INJECTION, SOLUTION INTRAVENOUS; SUBCUTANEOUS at 12:44

## 2024-11-28 RX ADMIN — SODIUM CHLORIDE, PRESERVATIVE FREE 10 ML: 5 INJECTION INTRAVENOUS at 08:09

## 2024-11-28 RX ADMIN — HEPARIN SODIUM 11 UNITS/KG/HR: 10000 INJECTION, SOLUTION INTRAVENOUS at 05:39

## 2024-11-28 RX ADMIN — INSULIN GLARGINE 35 UNITS: 100 INJECTION, SOLUTION SUBCUTANEOUS at 08:05

## 2024-11-28 RX ADMIN — INSULIN LISPRO 8 UNITS: 100 INJECTION, SOLUTION INTRAVENOUS; SUBCUTANEOUS at 08:25

## 2024-11-28 RX ADMIN — METOPROLOL TARTRATE 25 MG: 25 TABLET, FILM COATED ORAL at 20:39

## 2024-11-28 ASSESSMENT — PAIN SCALES - WONG BAKER
WONGBAKER_NUMERICALRESPONSE: HURTS A LITTLE BIT
WONGBAKER_NUMERICALRESPONSE: NO HURT
WONGBAKER_NUMERICALRESPONSE: HURTS A LITTLE BIT
WONGBAKER_NUMERICALRESPONSE: NO HURT
WONGBAKER_NUMERICALRESPONSE: HURTS A LITTLE BIT

## 2024-11-28 ASSESSMENT — PAIN DESCRIPTION - ORIENTATION: ORIENTATION: LOWER

## 2024-11-28 ASSESSMENT — PAIN DESCRIPTION - LOCATION: LOCATION: BACK

## 2024-11-28 ASSESSMENT — PAIN SCALES - GENERAL
PAINLEVEL_OUTOF10: 3
PAINLEVEL_OUTOF10: 3
PAINLEVEL_OUTOF10: 0
PAINLEVEL_OUTOF10: 0

## 2024-11-28 ASSESSMENT — PAIN DESCRIPTION - DESCRIPTORS: DESCRIPTORS: ACHING;DISCOMFORT

## 2024-11-28 ASSESSMENT — PAIN - FUNCTIONAL ASSESSMENT: PAIN_FUNCTIONAL_ASSESSMENT: PREVENTS OR INTERFERES SOME ACTIVE ACTIVITIES AND ADLS

## 2024-11-28 NOTE — PROCEDURES
PROCEDURE NOTE  Date: 11/28/2024   Name: Priya Lema  YOB: 1965    Procedures EKG completed, given to Magy BLANCAS

## 2024-11-28 NOTE — RT PROTOCOL NOTE
RT Inhaler-Nebulizer Bronchodilator Protocol Note    There is a bronchodilator order in the chart from a provider indicating to follow the RT Bronchodilator Protocol and there is an “Initiate RT Inhaler-Nebulizer Bronchodilator Protocol” order as well (see protocol at bottom of note).    CXR Findings:  XR CHEST PORTABLE    Result Date: 11/28/2024  Left lung base opacities. The differential diagnosis includes atelectasis and pneumonia. The remainder of the examination is unchanged. This document has been electronically signed by: Eitan Jacobs MD on 11/28/2024 03:35 AM    XR CHEST PORTABLE    Result Date: 11/27/2024  1. Small left-sided pleural effusion. 2. Bilateral lower lung atelectasis/infiltrate. **This report has been created using voice recognition software. It may contain minor errors which are inherent in voice recognition technology.** Electronically signed by Dr. Jamir Stubbs    XR CHEST PORTABLE    Result Date: 11/27/2024  Mild interstitial pulmonary opacities bilaterally. The differential diagnosis includes mild pulmonary vascular congestion and pneumonia. Cardiomegaly. Interval removal of endotracheal tube and nasogastric tube. This document has been electronically signed by: Eitan Jacobs MD on 11/27/2024 06:37 AM      The findings from the last RT Protocol Assessment were as follows:   History Pulmonary Disease: Chronic pulmonary disease  Respiratory Pattern: Regular pattern and RR 12-20 bpm  Breath Sounds: Slightly diminished and/or crackles  Cough: Strong, spontaneous, non-productive  Indication for Bronchodilator Therapy:    Bronchodilator Assessment Score: 4    Aerosolized bronchodilator medication orders have been revised according to the RT Inhaler-Nebulizer Bronchodilator Protocol below.    Respiratory Therapist to perform RT Therapy Protocol Assessment initially then follow the protocol.  Repeat RT Therapy Protocol Assessment PRN for score 0-3 or on second treatment, BID, and PRN for scores  above 3.    No Indications - adjust the frequency to every 6 hours PRN wheezing or bronchospasm, if no treatments needed after 48 hours then discontinue using Per Protocol order mode.     If indication present, adjust the RT bronchodilator orders based on the Bronchodilator Assessment Score as indicated below.  Use Inhaler orders unless patient has one or more of the following: on home nebulizer, not able to hold breath for 10 seconds, is not alert and oriented, cannot activate and use MDI correctly, or respiratory rate 25 breaths per minute or more, then use the equivalent nebulizer order(s) with same Frequency and PRN reasons based on the score.  If a patient is on this medication at home then do not decrease Frequency below that used at home.    0-3 - enter or revise RT bronchodilator order(s) to equivalent RT Bronchodilator order with Frequency of every 4 hours PRN for wheezing or increased work of breathing using Per Protocol order mode.        4-6 - enter or revise RT Bronchodilator order(s) to two equivalent RT bronchodilator orders with one order with BID Frequency and one order with Frequency of every 4 hours PRN wheezing or increased work of breathing using Per Protocol order mode.        7-10 - enter or revise RT Bronchodilator order(s) to two equivalent RT bronchodilator orders with one order with TID Frequency and one order with Frequency of every 4 hours PRN wheezing or increased work of breathing using Per Protocol order mode.       11-13 - enter or revise RT Bronchodilator order(s) to one equivalent RT bronchodilator order with QID Frequency and an Albuterol order with Frequency of every 4 hours PRN wheezing or increased work of breathing using Per Protocol order mode.      Greater than 13 - enter or revise RT Bronchodilator order(s) to one equivalent RT bronchodilator order with every 4 hours Frequency and an Albuterol order with Frequency of every 2 hours PRN wheezing or increased work of breathing

## 2024-11-28 NOTE — PLAN OF CARE
chronic conditions and comorbid symptoms for stability, deterioration, or improvement   Collaborate with multidisciplinary team to address chronic and comorbid conditions and prevent exacerbation or deterioration   Update acute care plan with appropriate goals if chronic or comorbid symptoms are exacerbated and prevent overall improvement and discharge     Problem: Confusion  Goal: Confusion, delirium, dementia, or psychosis is improved or at baseline  Description: INTERVENTIONS:  1. Assess for possible contributors to thought disturbance, including medications, impaired vision or hearing, underlying metabolic abnormalities, dehydration, psychiatric diagnoses, and notify attending LIP  2. [place] high risk fall precautions, as indicated  3. Provide frequent short contacts to provide reality reorientation, refocusing and direction  4. Decrease environmental stimuli, including noise as appropriate  5. Monitor and intervene to maintain adequate nutrition, hydration, elimination, sleep and activity  6. If unable to ensure safety without constant attention obtain sitter and review sitter guidelines with assigned personnel  7. Initiate Psychosocial CNS and Spiritual Care consult, as indicated  Outcome: Progressing  Flowsheets (Taken 11/28/2024 0127)  Effect of thought disturbance (confusion, delirium, dementia, or psychosis) are managed with adequate functional status:   Assess for contributors to thought disturbance, including medications, impaired vision or hearing, underlying metabolic abnormalities, dehydration, psychiatric diagnoses, notify LIP   [place] high risk fall precautions, as indicated     Problem: Nutrition Deficit:  Goal: Optimize nutritional status  Outcome: Progressing  Flowsheets (Taken 11/28/2024 0127)  Nutrient intake appropriate for improving, restoring, or maintaining nutritional needs:   Assess nutritional status and recommend course of action   Monitor oral intake, labs, and treatment plans    Care plan reviewed with patient.  Patient verbalizes understanding of the plan of care and contributed to goal setting.

## 2024-11-28 NOTE — PROGRESS NOTES
which are inherent in voice recognition technology.**            Electronically signed by Dr. Jamir Stubbs      XR CHEST PORTABLE   Final Result      Mild interstitial pulmonary opacities bilaterally. The differential    diagnosis includes mild pulmonary vascular congestion and pneumonia.      Cardiomegaly.      Interval removal of endotracheal tube and nasogastric tube.      This document has been electronically signed by: Eitan Jacobs MD on    11/27/2024 06:37 AM      CT HEAD WO CONTRAST   Final Result   1. Limited exam. No acute disease.   2. Left sphenoid sinus disease is noted.   3. Opacification of the mastoid air cells and middle ear cavities, without    aggressive bony destructive change or acute fracture. This can be    correlated with patient history/symptoms.      This document has been electronically signed by: Jim Mcpherson M.D. on    11/26/2024 11:21 PM      All CTs at this facility use dose modulation techniques and iterative    reconstructions, and/or weight-based dosing   when appropriate to reduce radiation to a low as reasonably achievable.      XR CHEST PORTABLE   Final Result   1. Improved aeration involving the bilateral lung bases with some residual    opacities of the medial right lung base and within the retrocardiac region.      This document has been electronically signed by: Vincent Miller DO on    11/26/2024 02:14 AM      XR CHEST PORTABLE   Final Result   Persistent under aeration versus infiltrate in the left lung base   and medial right lung base.               **This report has been created using voice recognition software. It may contain   minor errors which are inherent in voice recognition technology.**      Electronically signed by Dr. Rossi German      XR CHEST PORTABLE   Final Result      1. Medical devices as described.   2. Cardiomegaly with mild pulmonary vascular congestion.            **This report has been created using voice recognition software. It may contain  in a supine position.     US RENAL COMPLETE    Result Date: 11/21/2024  US Renal Comparison: None Findings: RIGHT KIDNEY - 12.3 x 5.1 x 4.7 cm Resistive Index - 0.60 Cortical Thickness - 1.1 cm No stones. Two cysts, larger 5.0 x 5.3 x 4.1 cm at the upper pole. No hydronephrosis. LEFT KIDNEY - 12.3 x 5.9 x 6.3 cm Resistive Index - 0.59 Cortical Thickness - 1.1 cm 9 x 9 mm stone. Mild hydronephrosis. Bladder decompressed by Salazar catheter.     Impression: Left renal stone. Mild left hydronephrosis. Right kidney unremarkable aside from two cysts. This document has been electronically signed by: Pebbles Mathis MD on 11/21/2024 06:10 AM    CT COMPARISON OF OUTSIDE FILMS    Result Date: 11/20/2024  Radiology exam is complete. No Radiologist dictation. Please follow up with ordering provider.     XR CHEST PORTABLE    Result Date: 11/20/2024  1 view chest x-ray. Comparison: None Findings: Endotracheal tube is in satisfactory position about 3 cm above the shlomo. Right IJ catheter tip is near the superior cavoatrial junction. NG tube passes into the stomach. Linear opacity in the left lung bases consistent with subsegmental atelectasis or scarring. There are diffuse bilateral interstitial opacities and hazy airspace opacities suggestive of pulmonary edema or pneumonitis. No effusion is seen. Cardiomediastinal silhouette is within normal limits.     Impression: Findings as above. This document has been electronically signed by: Veto Colmenares MD on 11/20/2024 09:54 PM      Electronically signed by Rupesh Gallagher DO on 11/28/2024 at 7:19 AM

## 2024-11-29 ENCOUNTER — APPOINTMENT (OUTPATIENT)
Dept: GENERAL RADIOLOGY | Age: 59
End: 2024-11-29
Attending: STUDENT IN AN ORGANIZED HEALTH CARE EDUCATION/TRAINING PROGRAM
Payer: COMMERCIAL

## 2024-11-29 ENCOUNTER — TELEPHONE (OUTPATIENT)
Dept: UROLOGY | Age: 59
End: 2024-11-29

## 2024-11-29 DIAGNOSIS — N20.0 CALCULUS OF RENAL PELVIS: Primary | ICD-10-CM

## 2024-11-29 PROBLEM — N20.1 URETERAL CALCULUS, LEFT: Status: ACTIVE | Noted: 2024-11-29

## 2024-11-29 PROBLEM — R31.0 GROSS HEMATURIA: Status: ACTIVE | Noted: 2024-11-29

## 2024-11-29 LAB
ALBUMIN SERPL BCG-MCNC: 2.8 G/DL (ref 3.5–5.1)
ALP SERPL-CCNC: 70 U/L (ref 38–126)
ALT SERPL W/O P-5'-P-CCNC: 39 U/L (ref 11–66)
ANION GAP SERPL CALC-SCNC: 10 MEQ/L (ref 8–16)
ANION GAP SERPL CALC-SCNC: 9 MEQ/L (ref 8–16)
AST SERPL-CCNC: 18 U/L (ref 5–40)
BILIRUB SERPL-MCNC: 0.7 MG/DL (ref 0.3–1.2)
BUN SERPL-MCNC: 35 MG/DL (ref 7–22)
BUN SERPL-MCNC: 41 MG/DL (ref 7–22)
CA-I BLD ISE-SCNC: 1.1 MMOL/L (ref 1.12–1.32)
CALCIUM SERPL-MCNC: 7.7 MG/DL (ref 8.5–10.5)
CALCIUM SERPL-MCNC: 7.7 MG/DL (ref 8.5–10.5)
CHLORIDE SERPL-SCNC: 109 MEQ/L (ref 98–111)
CHLORIDE SERPL-SCNC: 114 MEQ/L (ref 98–111)
CO2 SERPL-SCNC: 29 MEQ/L (ref 23–33)
CO2 SERPL-SCNC: 30 MEQ/L (ref 23–33)
CREAT SERPL-MCNC: 1 MG/DL (ref 0.4–1.2)
CREAT SERPL-MCNC: 1.2 MG/DL (ref 0.4–1.2)
DEPRECATED RDW RBC AUTO: 49.5 FL (ref 35–45)
ERYTHROCYTE [DISTWIDTH] IN BLOOD BY AUTOMATED COUNT: 18.1 % (ref 11.5–14.5)
GFR SERPL CREATININE-BSD FRML MDRD: 51 ML/MIN/1.73M2
GFR SERPL CREATININE-BSD FRML MDRD: 64 ML/MIN/1.73M2
GLUCOSE BLD STRIP.AUTO-MCNC: 124 MG/DL (ref 70–108)
GLUCOSE BLD STRIP.AUTO-MCNC: 151 MG/DL (ref 70–108)
GLUCOSE BLD STRIP.AUTO-MCNC: 195 MG/DL (ref 70–108)
GLUCOSE BLD STRIP.AUTO-MCNC: 99 MG/DL (ref 70–108)
GLUCOSE SERPL-MCNC: 148 MG/DL (ref 70–108)
GLUCOSE SERPL-MCNC: 95 MG/DL (ref 70–108)
HCT VFR BLD AUTO: 27.9 % (ref 37–47)
HEPARIN UNFRACTIONATED: 0.45 U/ML (ref 0.3–0.7)
HGB BLD-MCNC: 8.6 GM/DL (ref 12–16)
MAGNESIUM SERPL-MCNC: 1.7 MG/DL (ref 1.6–2.4)
MCH RBC QN AUTO: 25.4 PG (ref 26–33)
MCHC RBC AUTO-ENTMCNC: 30.8 GM/DL (ref 32.2–35.5)
MCV RBC AUTO: 82.3 FL (ref 81–99)
PHOSPHATE SERPL-MCNC: 3.2 MG/DL (ref 2.4–4.7)
PLATELET # BLD AUTO: 153 THOU/MM3 (ref 130–400)
PMV BLD AUTO: 12.7 FL (ref 9.4–12.4)
POTASSIUM SERPL-SCNC: 3.6 MEQ/L (ref 3.5–5.2)
POTASSIUM SERPL-SCNC: 4 MEQ/L (ref 3.5–5.2)
PROT SERPL-MCNC: 5.5 G/DL (ref 6.1–8)
RBC # BLD AUTO: 3.39 MILL/MM3 (ref 4.2–5.4)
SODIUM SERPL-SCNC: 149 MEQ/L (ref 135–145)
SODIUM SERPL-SCNC: 152 MEQ/L (ref 135–145)
WBC # BLD AUTO: 21.7 THOU/MM3 (ref 4.8–10.8)

## 2024-11-29 PROCEDURE — 6370000000 HC RX 637 (ALT 250 FOR IP): Performed by: INTERNAL MEDICINE

## 2024-11-29 PROCEDURE — 71045 X-RAY EXAM CHEST 1 VIEW: CPT

## 2024-11-29 PROCEDURE — 82948 REAGENT STRIP/BLOOD GLUCOSE: CPT

## 2024-11-29 PROCEDURE — 99232 SBSQ HOSP IP/OBS MODERATE 35: CPT | Performed by: INTERNAL MEDICINE

## 2024-11-29 PROCEDURE — 99232 SBSQ HOSP IP/OBS MODERATE 35: CPT

## 2024-11-29 PROCEDURE — 92611 MOTION FLUOROSCOPY/SWALLOW: CPT

## 2024-11-29 PROCEDURE — 97164 PT RE-EVAL EST PLAN CARE: CPT

## 2024-11-29 PROCEDURE — 92610 EVALUATE SWALLOWING FUNCTION: CPT

## 2024-11-29 PROCEDURE — 97166 OT EVAL MOD COMPLEX 45 MIN: CPT

## 2024-11-29 PROCEDURE — 2700000000 HC OXYGEN THERAPY PER DAY

## 2024-11-29 PROCEDURE — 94640 AIRWAY INHALATION TREATMENT: CPT

## 2024-11-29 PROCEDURE — 2060000000 HC ICU INTERMEDIATE R&B

## 2024-11-29 PROCEDURE — 2580000003 HC RX 258: Performed by: PHYSICIAN ASSISTANT

## 2024-11-29 PROCEDURE — 6360000002 HC RX W HCPCS

## 2024-11-29 PROCEDURE — 94669 MECHANICAL CHEST WALL OSCILL: CPT

## 2024-11-29 PROCEDURE — 6370000000 HC RX 637 (ALT 250 FOR IP)

## 2024-11-29 PROCEDURE — 94660 CPAP INITIATION&MGMT: CPT

## 2024-11-29 PROCEDURE — 6360000002 HC RX W HCPCS: Performed by: UROLOGY

## 2024-11-29 PROCEDURE — 83735 ASSAY OF MAGNESIUM: CPT

## 2024-11-29 PROCEDURE — 36415 COLL VENOUS BLD VENIPUNCTURE: CPT

## 2024-11-29 PROCEDURE — 2500000003 HC RX 250 WO HCPCS: Performed by: INTERNAL MEDICINE

## 2024-11-29 PROCEDURE — 92526 ORAL FUNCTION THERAPY: CPT

## 2024-11-29 PROCEDURE — 85027 COMPLETE CBC AUTOMATED: CPT

## 2024-11-29 PROCEDURE — 2580000003 HC RX 258: Performed by: INTERNAL MEDICINE

## 2024-11-29 PROCEDURE — 80053 COMPREHEN METABOLIC PANEL: CPT

## 2024-11-29 PROCEDURE — 99222 1ST HOSP IP/OBS MODERATE 55: CPT | Performed by: NURSE PRACTITIONER

## 2024-11-29 PROCEDURE — 74230 X-RAY XM SWLNG FUNCJ C+: CPT

## 2024-11-29 PROCEDURE — 97116 GAIT TRAINING THERAPY: CPT

## 2024-11-29 PROCEDURE — 84100 ASSAY OF PHOSPHORUS: CPT

## 2024-11-29 PROCEDURE — 97530 THERAPEUTIC ACTIVITIES: CPT

## 2024-11-29 PROCEDURE — 94761 N-INVAS EAR/PLS OXIMETRY MLT: CPT

## 2024-11-29 PROCEDURE — 6360000002 HC RX W HCPCS: Performed by: INTERNAL MEDICINE

## 2024-11-29 PROCEDURE — 82330 ASSAY OF CALCIUM: CPT

## 2024-11-29 PROCEDURE — 85520 HEPARIN ASSAY: CPT

## 2024-11-29 RX ORDER — BUMETANIDE 0.25 MG/ML
1 INJECTION, SOLUTION INTRAMUSCULAR; INTRAVENOUS ONCE
Status: COMPLETED | OUTPATIENT
Start: 2024-11-29 | End: 2024-11-29

## 2024-11-29 RX ADMIN — SODIUM CHLORIDE, PRESERVATIVE FREE 10 ML: 5 INJECTION INTRAVENOUS at 08:14

## 2024-11-29 RX ADMIN — BARIUM SULFATE 30 ML: 400 SUSPENSION ORAL at 10:21

## 2024-11-29 RX ADMIN — HEPARIN SODIUM 11 UNITS/KG/HR: 10000 INJECTION, SOLUTION INTRAVENOUS at 05:22

## 2024-11-29 RX ADMIN — ALBUTEROL SULFATE 2.5 MG: 2.5 SOLUTION RESPIRATORY (INHALATION) at 08:03

## 2024-11-29 RX ADMIN — HYDROXYZINE HYDROCHLORIDE 10 MG: 10 TABLET ORAL at 19:41

## 2024-11-29 RX ADMIN — BUDESONIDE 250 MCG: 0.25 INHALANT RESPIRATORY (INHALATION) at 08:15

## 2024-11-29 RX ADMIN — WATER 1000 MG: 1 INJECTION INTRAMUSCULAR; INTRAVENOUS; SUBCUTANEOUS at 10:44

## 2024-11-29 RX ADMIN — METRONIDAZOLE 500 MG: 500 TABLET ORAL at 05:20

## 2024-11-29 RX ADMIN — METOPROLOL TARTRATE 25 MG: 25 TABLET, FILM COATED ORAL at 19:41

## 2024-11-29 RX ADMIN — METOPROLOL TARTRATE 25 MG: 25 TABLET, FILM COATED ORAL at 08:12

## 2024-11-29 RX ADMIN — BUMETANIDE 1 MG: 0.25 INJECTION INTRAMUSCULAR; INTRAVENOUS at 08:10

## 2024-11-29 RX ADMIN — INSULIN GLARGINE 35 UNITS: 100 INJECTION, SOLUTION SUBCUTANEOUS at 08:13

## 2024-11-29 RX ADMIN — ASPIRIN 81 MG: 81 TABLET, CHEWABLE ORAL at 08:12

## 2024-11-29 RX ADMIN — HYDRALAZINE HYDROCHLORIDE 10 MG: 20 INJECTION INTRAMUSCULAR; INTRAVENOUS at 08:08

## 2024-11-29 RX ADMIN — BARIUM SULFATE 70 ML: 0.81 POWDER, FOR SUSPENSION ORAL at 10:21

## 2024-11-29 RX ADMIN — BARIUM SULFATE 20 ML: 400 PASTE ORAL at 10:21

## 2024-11-29 RX ADMIN — SODIUM CHLORIDE, PRESERVATIVE FREE 10 ML: 5 INJECTION INTRAVENOUS at 19:41

## 2024-11-29 RX ADMIN — TIOTROPIUM BROMIDE AND OLODATEROL 2 PUFF: 3.124; 2.736 SPRAY, METERED RESPIRATORY (INHALATION) at 08:12

## 2024-11-29 RX ADMIN — METRONIDAZOLE 500 MG: 500 TABLET ORAL at 22:20

## 2024-11-29 RX ADMIN — METRONIDAZOLE 500 MG: 500 TABLET ORAL at 14:10

## 2024-11-29 RX ADMIN — ATORVASTATIN CALCIUM 40 MG: 40 TABLET, FILM COATED ORAL at 19:40

## 2024-11-29 RX ADMIN — INSULIN LISPRO 4 UNITS: 100 INJECTION, SOLUTION INTRAVENOUS; SUBCUTANEOUS at 08:13

## 2024-11-29 ASSESSMENT — PAIN SCALES - WONG BAKER
WONGBAKER_NUMERICALRESPONSE: HURTS A LITTLE BIT

## 2024-11-29 ASSESSMENT — PAIN SCALES - GENERAL: PAINLEVEL_OUTOF10: 0

## 2024-11-29 NOTE — CARE COORDINATION
11/29/24, 2:17 PM EST    DISCHARGE PLANNING EVALUATION    Met with Priya, daughter in law Sara galaviz granddaughter.  Made them aware Sandra will be following for possible admission.  They are agreeable.

## 2024-11-29 NOTE — TELEPHONE ENCOUNTER
Patient will need scheduled for Cystoscopy, left ureteroscopy, laser lithotripsy, basket retrieval of stone fragments, left ureteral stent exchange in 2-3 weeks pending hospital course.    Debra placed stent on 11/21.

## 2024-11-29 NOTE — PROGRESS NOTES
Cardiology Progress Note      Patient:  Priya Lema  YOB: 1965  MRN: [id number]   Acct: [id number]  Admit Date:  11/20/2024  Primary Cardiologist: mando Zapata   Seen by Dr. Haque     Per prior cardiology consult note-  REASON FOR CONSULT:   New drop in EF, ? Ischemic work up, elevated troponin, new atrial fibrillation       CHIEF COMPLAINT:    Flank pain     HISTORY OF PRESENT ILLNESS:    Priya Lema is a 59 year old female patient with past medical history that includes hypertension, COPD, tobacco abuse, obesity. She has h/o chronic respiratory failure and I s on home oxygen. Her family history is positive for CHF in her mother. Patient is intubated and is unable to provide history. Her family at bedside states that she was referred to a cardiologist last year for swelling in her legs. Echocardiogram on 12/2023 revealed an EF of 50-55%.The patient was admitted to the hospital on 11/20/2024. She had left flank pain and was found to have RAINE. Patient developed worsening hypoxia and is now s/p intubation, being treated for COPD exacerbation. The patient is also being treated for UTI, Pyelonephritis, kidney stones and sepsis. Gram negative bacillin were noted on urine culture. She is followed by Urology and is now s/p left ureteral stent on 11/21/2024. She was noted to have new drop in EF on Echocardiogram with new onset atrial fibrillation and elevated troponin, cardiology consult was requested for further management and possible ischemic evaluation. Echocardiogram on 11/21/2024 revealed an EF of 30-35%.        Subjective (Events in last 24 hours):   Awake, resting in bed on 4A with HOB up 45 degrees  Alert  Denies chest discomfort  Breathing not yet at baseline - shakes head no when asked if could lie flat in bed  Cherry red urine in [place]  No peripheral edema      11/27/24:  Pt in bed - droggy  She can nod head yes and no to answer questions   Noted hematuria in montague bag - cherry  27.9 11/29/2024 03:20 AM     11/29/2024 03:20 AM       CMP:    Lab Results   Component Value Date/Time     11/29/2024 03:20 AM    K 4.0 11/29/2024 03:20 AM     11/29/2024 03:20 AM    CO2 29 11/29/2024 03:20 AM    BUN 41 11/29/2024 03:20 AM    CREATININE 1.2 11/29/2024 03:20 AM    LABGLOM 51 11/29/2024 03:20 AM    GLUCOSE 148 11/29/2024 03:20 AM    CALCIUM 7.7 11/29/2024 03:20 AM       Hepatic Function Panel:    Lab Results   Component Value Date/Time    ALKPHOS 70 11/29/2024 03:20 AM    ALT 39 11/29/2024 03:20 AM    AST 18 11/29/2024 03:20 AM    BILITOT 0.7 11/29/2024 03:20 AM    BILIDIR 0.3 11/20/2024 05:51 PM       Magnesium:    Lab Results   Component Value Date/Time    MG 1.7 11/29/2024 03:20 AM       PT/INR:    Lab Results   Component Value Date/Time    INR 1.22 11/22/2024 08:15 AM       HgBA1c:    Lab Results   Component Value Date/Time    LABA1C 7.9 11/23/2024 04:30 AM       FLP:    Lab Results   Component Value Date/Time    TRIG 236 11/28/2024 03:39 AM    HDL 31 11/28/2024 03:39 AM       TSH:    Lab Results   Component Value Date/Time    TSH 1.960 11/20/2024 05:51 PM         Assessment:    New onset AFB -- currently in SR   [id number]   on IV heparin   Was on iv amiodarone but not now  just on BB    Elevated trop - demand - no ACS  50 / 52 / 125 / 121     ASP PNA    Hypokalemia   Hypernatremia    RAINE-- > resolved   Anemia- stable     Acute hypoxic resp failure - intubated - now extubated     COPD exacerbation     UTI (E Coli) / pyelonephritis / kidney stone sepsis    -s/p left ureteral stent on 11/21/2024       New CDMP EF 30-35% (was 50-55%)  - Likely demand (infectious related)   - will need ischemic work-up when stable to rule out underlying CAD     HTN - uncontrolled     HLD: lipid panel 11/28/2024; , HDL 31, LDL 35,     DM II  -A1c 7.9    COPD / CARROLL   Tobacco abuse       Plan:  At present - no cath planned - consider week of 12/1  Allow respiratory status to recovers.

## 2024-11-29 NOTE — PLAN OF CARE
Problem: Discharge Planning  Goal: Discharge to home or other facility with appropriate resources  Outcome: Progressing  Flowsheets (Taken 11/29/2024 1122)  Discharge to home or other facility with appropriate resources: Arrange for needed discharge resources and transportation as appropriate     Problem: Pain  Goal: Verbalizes/displays adequate comfort level or baseline comfort level  Outcome: Progressing     Problem: Neurosensory - Adult  Goal: Achieves stable or improved neurological status  Outcome: Progressing  Flowsheets (Taken 11/29/2024 1122)  Achieves stable or improved neurological status: Initiate measures to prevent increased intracranial pressure  Goal: Achieves maximal functionality and self care  Outcome: Progressing  Flowsheets (Taken 11/29/2024 1122)  Achieves maximal functionality and self care: Encourage and assist patient to increase activity and self care with guidance from physical therapy/occupational therapy     Problem: Respiratory - Adult  Goal: Achieves optimal ventilation and oxygenation  Outcome: Progressing  Flowsheets (Taken 11/29/2024 1122)  Achieves optimal ventilation and oxygenation: Assess for changes in mentation and behavior  Goal: Clear lung sounds  11/29/2024 1530 by Ban Ellsworth, RN  Outcome: Progressing  11/29/2024 0809 by Rossi Sandoval, RCP  Outcome: Progressing  Note: Txs to help improve lung aeration. Patient mutually agreed on goals.       Problem: Cardiovascular - Adult  Goal: Maintains optimal cardiac output and hemodynamic stability  Outcome: Progressing  Flowsheets (Taken 11/29/2024 1122)  Maintains optimal cardiac output and hemodynamic stability: Administer fluid and/or volume expanders as ordered  Goal: Absence of cardiac dysrhythmias or at baseline  Outcome: Progressing  Flowsheets (Taken 11/29/2024 1122)  Absence of cardiac dysrhythmias or at baseline: Monitor cardiac rate and rhythm     Problem: Skin/Tissue Integrity - Adult  Goal: Skin integrity remains  Independent Practitioner order if needed   Monitor intake/output and perform bladder scan as needed  Goal: Urinary catheter remains patent  Outcome: Progressing  Flowsheets (Taken 11/29/2024 1122)  Urinary catheter remains patent: Irrigate catheter per Licensed Independent Practitioner order if indicated and notify Licensed Independent Practitioner if unable to irrigate     Problem: Infection - Adult  Goal: Absence of infection at discharge  Outcome: Progressing  Flowsheets (Taken 11/29/2024 1122)  Absence of infection at discharge: Monitor lab/diagnostic results  Goal: Absence of infection during hospitalization  Outcome: Progressing  Flowsheets (Taken 11/29/2024 1122)  Absence of infection during hospitalization: Administer medications as ordered     Problem: Metabolic/Fluid and Electrolytes - Adult  Goal: Electrolytes maintained within normal limits  Outcome: Progressing  Flowsheets (Taken 11/29/2024 1122)  Electrolytes maintained within normal limits:   Monitor labs and assess patient for signs and symptoms of electrolyte imbalances   Administer electrolyte replacement as ordered  Goal: Hemodynamic stability and optimal renal function maintained  Outcome: Progressing  Flowsheets (Taken 11/29/2024 1122)  Hemodynamic stability and optimal renal function maintained: Encourage oral intake as appropriate  Goal: Glucose maintained within prescribed range  Outcome: Progressing  Flowsheets (Taken 11/29/2024 1122)  Glucose maintained within prescribed range: Administer ordered medications to maintain glucose within target range     Problem: Hematologic - Adult  Goal: Maintains hematologic stability  Outcome: Progressing  Flowsheets (Taken 11/29/2024 1122)  Maintains hematologic stability: Assess for signs and symptoms of bleeding or hemorrhage     Problem: Skin/Tissue Integrity  Goal: Absence of new skin breakdown  Description: 1.  Monitor for areas of redness and/or skin breakdown  2.  Assess vascular access sites

## 2024-11-29 NOTE — PROGRESS NOTES
[facility]  INPATIENT SPEECH THERAPY  [facility] 4A  DAILY NOTE    Discharge Recommendations: Continue to Assess Pending Progress    SLP Individual Minutes  Time In: 824  Time Out: 833  Minutes: 9  Timed Code Treatment Minutes: 0 Minutes       Date: 2024  Patient Name: Priya Lema      CSN: [id number]   : 1965  (59 y.o.)  Gender: female   Referring Physician:  Rupesh Gallagher DO   Diagnosis: Pyelonephritis  Precautions: aspiration risk   Current Diet: NPO+NGT  Respiratory Status: Nasal Canula: 3LPM  Swallowing Strategies: Not Applicable  Date of Last MBS/FEES: Not Applicable    Pain:  No pain reported.    Subjective:  Patient seen with RN Ban's approval. Patient resting upright in bed with no family present at bedside. Pleasant and cooperative with assessment.     Short-Term Goals:  SHORT TERM GOAL #1:  Goal 1: Patient will answer complex yes/no questions with 80% accuracy given min cues to improve efficacy of comprehension requirements for conveying of wants/needs.  INTERVENTIONS:DNT d/t focus on STG4    SHORT TERM GOAL #2:  Goal 2: Patient will execute 2 step commands with 80% accuracy given min cues to improve participation in current critical care context.  INTERVENTIONS:DNT d/t focus on STG4    SHORT TERM GOAL #3:  Goal 3: Patient will achieve consistent vertical+horizontal visual scanning to ascertain readiness for inclusion of alternate communiction modalities within medical communication environment.  INTERVENTIONS:DNT d/t focus on STG4    SHORT TERM GOAL #4:  Goal 4: Patient will consume conservative PO offerings demonstrating consistent pharyngeal trigger to determine potential readiness for formal instrumental assessment.  INTERVENTIONS: Comprehensive oral care completed with hydrogen peroxide and toothette. Xerostomia evident; however, no appreciable debris removed. Ice chips + thin liquids completed with consistent pharyngeal trigger achieved. Delayed cough

## 2024-11-29 NOTE — PROGRESS NOTES
[facility]  INPATIENT PHYSICAL THERAPY  RE-EVALUATION  [facility] 4A - 4A-04/004-A    Discharge Recommendations: IP Rehab  Equipment Recommendations:    Pend pt progress            Time In: 906  Time Out: 929  Timed Code Treatment Minutes: 15 Minutes  Minutes: 23          Date: 2024  Patient Name: Priya Lema,  Gender:  female        MRN: [id number]  : 1965  (59 y.o.)      Referring Practitioner: Shireen Murillo MD  Diagnosis: Pyelonephritis  Additional Pertinent Hx: 59 y.o. female with a history of hypertension, hyperlipidemia, COPD, and chronic respiratory failure with hypoxia who was transferred from an outside facility for sepsis secondary to left-sided pyelonephritis with an associated left-sided nephrolithiasis.  The patient reports she presented to the outside facility initially for some left-sided back/flank pain.  She was not having any associated urinary symptoms at that time.  She does report some subjective fevers and chills.  She was found to have a left-sided nephrolithiasis and was transferred to [facility] for urology evaluation.  At this time, she does report some mild pain.  She is on oxygen at baseline.Rapid response  for decreased mentation, afib 's. Given 1L fld bolus. Transferred to ICU and intubated for airway protection. Taken to surgery today for cystoscopy with left ureteral stent. s/p CYSTOSCOPY LEFT URETERAL STENT INSERTION on . Unable to extubate post-op d/t tongue swelling and no cuff leak. Pt still intubated on eval . Extubated      Restrictions/Precautions:  Restrictions/Precautions: General Precautions, Fall Risk       Other position/activity restrictions: monitor BP & HR    Required Braces or Orthoses?: No      Subjective:  Chart Reviewed: Yes  Patient assessed for rehabilitation services?: Yes  Subjective: RN approved session. Pt agreeable to treatment.    General:  Overall Orientation Status: Within  Functional Limits  Vision: Within Functional Limits  Hearing: Within functional limits       Pain: 0/10:     Vitals:  3L NC    Social/Functional History:    Lives With:  (sister)  Type of Home: House  Home Layout: Two level, Bed/Bath upstairs  Home Access: Stairs to enter with rails  Entrance Stairs - Number of Steps: 2  Home Equipment: None     Bathroom Shower/Tub: Tub/Shower unit  Bathroom Toilet: Standard  Bathroom Equipment: None       ADL Assistance: Independent  Homemaking Assistance: Independent  Ambulation Assistance: Independent  Transfer Assistance: Independent    Active : Yes     Additional Comments: Per CM note, Pt lives alone & was on 2-4L O2.    OBJECTIVE:  Range of Motion:  Bilateral Lower Extremity: WFL    Strength:  Bilateral Lower Extremity: WFL    Coordination:  Right LE: Heel to Dejesus WFL and Rapid Alternating Movement WFL  Left LE: Heel to Shin WFL and Rapid Alternating Movement WFL     Balance:  Static Sitting Balance:  Stand By Assistance, X 1  Static Standing Balance: Contact Guard Assistance, X 1    Bed Mobility:  Rolling to Right: Stand By Assistance, X 1, with head of bed raised, with rail   Supine to Sit: Stand By Assistance, X 1, with head of bed raised, with rail    Transfers:  Sit to Stand: Contact Guard Assistance, X 1, cues for hand placement, to/from chair without arms  Stand to Sit:Contact Guard Assistance, X 1, cues for hand placement, to/from chair with arms    Ambulation:  Contact Guard Assistance, X 1  Distance: 4 ft  Surface: Level Tile  Device: Standard Walker  Gait Deviations: Slow Nia, Decreased Step Length Bilaterally, Decreased Gait Speed, and Decreased Heel Strike Bilaterally     Stairs:  Not Tested    Exercise:  None    Functional Outcome Measures:  AMPA (6 CLICK) BASIC MOBILITY  AM-PAC Inpatient Mobility Raw Score : 17  AM-PAC Inpatient T-Scale Score : 42.13          Modified Antony:  Premorbid Functional Status: Not Applicable  Current Functional Status:

## 2024-11-29 NOTE — RT PROTOCOL NOTE
RT Inhaler-Nebulizer Bronchodilator Protocol Note    There is a bronchodilator order in the chart from a provider indicating to follow the RT Bronchodilator Protocol and there is an “Initiate RT Inhaler-Nebulizer Bronchodilator Protocol” order as well (see protocol at bottom of note).    CXR Findings:  XR CHEST PORTABLE    Result Date: 11/28/2024  Left lung base opacities. The differential diagnosis includes atelectasis and pneumonia. The remainder of the examination is unchanged. This document has been electronically signed by: Eitan Jacobs MD on 11/28/2024 03:35 AM    XR CHEST PORTABLE    Result Date: 11/27/2024  1. Small left-sided pleural effusion. 2. Bilateral lower lung atelectasis/infiltrate. **This report has been created using voice recognition software. It may contain minor errors which are inherent in voice recognition technology.** Electronically signed by Dr. Jamir Stubbs    XR CHEST PORTABLE    Result Date: 11/27/2024  Mild interstitial pulmonary opacities bilaterally. The differential diagnosis includes mild pulmonary vascular congestion and pneumonia. Cardiomegaly. Interval removal of endotracheal tube and nasogastric tube. This document has been electronically signed by: Eitan Jacobs MD on 11/27/2024 06:37 AM      The findings from the last RT Protocol Assessment were as follows:   History Pulmonary Disease: Chronic pulmonary disease  Respiratory Pattern: Regular pattern and RR 12-20 bpm  Breath Sounds: Intermittent or unilateral wheezes  Cough: Strong, spontaneous, non-productive  Indication for Bronchodilator Therapy: Decreased or absent breath sounds  Bronchodilator Assessment Score: 6    Aerosolized bronchodilator medication orders have been revised according to the RT Inhaler-Nebulizer Bronchodilator Protocol below.    Respiratory Therapist to perform RT Therapy Protocol Assessment initially then follow the protocol.  Repeat RT Therapy Protocol Assessment PRN for score 0-3 or on second

## 2024-11-29 NOTE — PROGRESS NOTES
Comprehensive Nutrition Assessment    Type and Reason for Visit: Reassess (NPO-SLP following)    Nutrition Recommendations/Plan:   Continue NGT feeds of TwoCal HN at 30 ml/hr.   Water flushes per provider- 140 ml q4hr.   SLP following for diet recommendations.      Malnutrition Assessment:  Malnutrition Status: At risk for malnutrition  Context: Acute Illness       Findings of the 6 clinical characteristics of malnutrition:  Energy Intake:  Mild decrease in energy intake (received tube feed while intubated, currently NPO pending swallow evaluation when appropriate; family reports patient with good appetite/ intake PTA)  Weight Loss:   (weight appears stable per office visit weights in EMR)     Body Fat Loss:  No body fat loss     Muscle Mass Loss:  No muscle mass loss    Fluid Accumulation:  Mild Generalized   Strength:  Not Performed    Nutrition Assessment:     Pt. nutritionally compromised AEB NPO d/t dysphagia/cognitive status.  At risk for further nutrition compromise r/t sepsis 2/2 pyelonephritis, s/p cystoscopy - L ureteral stent insertion (11/21), pneumonia, RAINE, heart failure, intubation 11/20-11/26, and underlying medical condition (PMHx: HTN, COPD, T2DM with A1C (11/23/24) 7.9%, CARROLL, COPD, chronic tobacco abuse).       Nutrition Related Findings:    Pt. Report/Treatments/Miscellaneous: Patient seen, sitting up in bed receiving breathing treatment, tube feeds are running at 30 ml/hr- goal rate. Per RN patient has been tolerating tube feeds well, water flushes were recently increased.    GI Status: Last BM 11/28  Wound: None   Edema: generalized non-pitting edema, per flowsheet   Pertinent Labs:   Lab Results   Component Value Date    LABA1C 7.9 (H) 11/23/2024     Recent Labs     11/27/24  0510 11/27/24  1150 11/28/24  0339 11/29/24  0320   * 152* 152* 152*   K 2.7* 3.5 4.1 4.0   * 114* 113* 114*   GLUCOSE 172* 224* 221* 148*   BUN 48* 44* 43* 41*   CREATININE 1.0 0.9 1.1 1.2   MG 1.7  --

## 2024-11-29 NOTE — PROGRESS NOTES
[facility]  [facility] 4A  [address] [address]  Dept: [phone]  Loc: [phone]  Visit Date: 2024    Urology Progress Note    Chief Complaint: Abdominal Pain    Impression/Plan:  Pyelonephritis  - Receiving Rocephin per primary, Cefepime initially. Urine culture from  showed pan-sensitive E. Coli.   - Still with Leukocytosis. WBC of 21.7.    2.   Obstructing Left Renal Pelvis Calculus  - 1.1 cm obstructing renal pelvis stone. S/P Cystoscopy, Left Ureteroscopy, Left Ureteral Stent Insertion by Dr. Richardson on 24. Stent in proper position as confirmed by KUB. Will plan for definitive stone treatment as OP in the upcoming weeks. Our office to coordinate.    3.   Gross Hematuria  - Tea colored urine without clots. On Heparin.   - Reach out to urology if urine becomes increasingly blood or if clots begin to form.    4.   RAINE  - CRT of 2.0 on admission, improved to 1.2 with placement of left ureteral stent.    URO signing off. Reach out to Urology on-call if hematuria worsens/clots begin to form.      Pertinent Urology Labs  GFR: 51  CRT: 1.2  WBC: 21.7  Hgb: 8.6          Vitals:  BP (!) 182/78   Pulse 71   Temp 98.4 °F (36.9 °C) (Oral)   Resp 20   Ht 1.499 m (4' 11\")   Wt 94 kg (207 lb 3.7 oz)   SpO2 99%   BMI 41.86 kg/m²   Temp  Av.2 °F (36.8 °C)  Min: 97.9 °F (36.6 °C)  Max: 98.8 °F (37.1 °C)    Intake/Output Summary (Last 24 hours) at 2024 1036  Last data filed at 2024 0800  Gross per 24 hour   Intake 1907.62 ml   Output 2350 ml   Net -442.38 ml       Social History     Socioeconomic History    Marital status:      Spouse name: Not on file    Number of children: Not on file    Years of education: Not on file    Highest education level: Not on file   Occupational History    Not on file   Tobacco Use    Smoking status: Not on file    Smokeless tobacco: Not on file   Substance and Sexual Activity    Alcohol

## 2024-11-29 NOTE — PROGRESS NOTES
Hospitalist Progress Note      Patient:  Priya Trejo    Unit/Bed:4A-04/004-A  YOB: 1965  MRN: [id number]   Acct: [id number]   PCP: Parmjit Tesfaye MD  Date of Admission: 11/20/2024    Assessment/Plan:    Sepsis secondary to left nephrolithiasis complicated by left pyelonephritis  Septic shock - resolved  CT scan at outside facility showed nephrolithiasis on the left side, and cystic characteristic on the right kidney.  Leukocytosis worsening, 29.3 from 19.5, secondary to steroids. Urine culture positive for E.coli. (11/20). Bclx NGTD. Respiratory panel negative (11/25). UA(11/25) reported no caast, WBC>100, RBC>200. Admitted to the ICU on pressors. Has been successfully weaned off. On cefepime.   Has remained hemodynamically stable and afebrile. Off pressors.   Downtrending WBC to 21  Previous infectious workup with urine culture from 11/20 growing pansensitive E coli.   Switched  patient to ceftriaxone, plan for 7 days of therapy  Had been followed by urology - s/p cystoscopy with left ureteral stent on 11/21/24.   Montague with red tinged urine   Maintain montague with prn hand irrigation  Will need to touch base with urology for clearance regarding potential DAPT  Will need definitive stone mgmt in few weeks    Acute on chronic hypoxic hypercapnic respiratory failure in setting of aspiration pneumonia  On 2 L oxygen at home.  Intubated on 11/20 for airway protection. CXR 11/26 shows improvement with right opacity and infiltrates. Mild improvement with left pleural effusion.   Respiratory status improving, weaned to 3L  Review of chest film - still with possible infiltrate. Uptrending WBC as above  ETT culture with light growth of yeast, likely candida. Likely colonization and would not treat in this situation based on clinical papers/studies  RVP negative  Switch from cefepime to CTX, as above  Improving with flagyl.  Contrast used: Definity. Procedure performed with the patient in a supine position.     US RENAL COMPLETE    Result Date: 11/21/2024  US Renal Comparison: None Findings: RIGHT KIDNEY - 12.3 x 5.1 x 4.7 cm Resistive Index - 0.60 Cortical Thickness - 1.1 cm No stones. Two cysts, larger 5.0 x 5.3 x 4.1 cm at the upper pole. No hydronephrosis. LEFT KIDNEY - 12.3 x 5.9 x 6.3 cm Resistive Index - 0.59 Cortical Thickness - 1.1 cm 9 x 9 mm stone. Mild hydronephrosis. Bladder decompressed by Salazar catheter.     Impression: Left renal stone. Mild left hydronephrosis. Right kidney unremarkable aside from two cysts. This document has been electronically signed by: Pebbles Mathis MD on 11/21/2024 06:10 AM    CT COMPARISON OF OUTSIDE FILMS    Result Date: 11/20/2024  Radiology exam is complete. No Radiologist dictation. Please follow up with ordering provider.     XR CHEST PORTABLE    Result Date: 11/20/2024  1 view chest x-ray. Comparison: None Findings: Endotracheal tube is in satisfactory position about 3 cm above the shlomo. Right IJ catheter tip is near the superior cavoatrial junction. NG tube passes into the stomach. Linear opacity in the left lung bases consistent with subsegmental atelectasis or scarring. There are diffuse bilateral interstitial opacities and hazy airspace opacities suggestive of pulmonary edema or pneumonitis. No effusion is seen. Cardiomediastinal silhouette is within normal limits.     Impression: Findings as above. This document has been electronically signed by: Veto Colmenares MD on 11/20/2024 09:54 PM      Electronically signed by Rupesh Gallagher DO on 11/29/2024 at 7:13 AM

## 2024-11-29 NOTE — RT PROTOCOL NOTE
RT Inhaler-Nebulizer Bronchodilator Protocol Note    There is a bronchodilator order in the chart from a provider indicating to follow the RT Bronchodilator Protocol and there is an “Initiate RT Inhaler-Nebulizer Bronchodilator Protocol” order as well (see protocol at bottom of note).    CXR Findings:  XR CHEST PORTABLE    Result Date: 11/29/2024  Left lung base opacities, increased. The differential diagnosis includes small pleural effusion with atelectasis and pneumonia. The remainder of the examination is unchanged. This document has been electronically signed by: Eitan Jacobs MD on 11/29/2024 03:23 AM    XR CHEST PORTABLE    Result Date: 11/28/2024  Left lung base opacities. The differential diagnosis includes atelectasis and pneumonia. The remainder of the examination is unchanged. This document has been electronically signed by: Eitan Jacobs MD on 11/28/2024 03:35 AM    XR CHEST PORTABLE    Result Date: 11/27/2024  1. Small left-sided pleural effusion. 2. Bilateral lower lung atelectasis/infiltrate. **This report has been created using voice recognition software. It may contain minor errors which are inherent in voice recognition technology.** Electronically signed by Dr. Jamir Stubbs      The findings from the last RT Protocol Assessment were as follows:   History Pulmonary Disease: Chronic pulmonary disease  Respiratory Pattern: Regular pattern and RR 12-20 bpm  Breath Sounds: Slightly diminished and/or crackles  Cough: Strong, spontaneous, non-productive  Indication for Bronchodilator Therapy: Decreased or absent breath sounds  Bronchodilator Assessment Score: 4    Aerosolized bronchodilator medication orders have been revised according to the RT Inhaler-Nebulizer Bronchodilator Protocol below.    Respiratory Therapist to perform RT Therapy Protocol Assessment initially then follow the protocol.  Repeat RT Therapy Protocol Assessment PRN for score 0-3 or on second treatment, BID, and PRN for scores above  Per Protocol order mode.     RT to enter RT Home Evaluation for COPD & MDI Assessment order using Per Protocol order mode.    Electronically signed by Rossi Sandoval RCP on 11/29/2024 at 8:08 AM

## 2024-11-29 NOTE — PLAN OF CARE
Problem: Respiratory - Adult  Goal: Achieves optimal ventilation and oxygenation  Outcome: Progressing  Flowsheets (Taken 11/28/2024 0844 by Luis Dennis RCP)  Achieves optimal ventilation and oxygenation: Assess for changes in respiratory status

## 2024-11-29 NOTE — PROGRESS NOTES
[facility]  [facility] 4A  Occupational Therapy  RE-EVALUATION    Discharge Recommendations: Continue to assess pending progress, Subacute/skilled nursing facility, and Patient would benefit from continued OT at discharge  Equipment Recommendations:   monitor pending progress      Time In: 1143  Time Out: 1208  Timed Code Treatment Minutes: 17 Minutes  Minutes: 25          Date: 2024  Patient Name: Priya Lema,   Gender: female      Room: [facility]004-A  MRN: [id number]  : 1965  (59 y.o.)  Referring Practitioner: Shireen Murillo MD  Diagnosis: Pyelonephritis  Additional Pertinent Hx: Per H & P:59 y.o. female with a history of hypertension, hyperlipidemia, COPD, and chronic respiratory failure with hypoxia who was transferred from an outside facility for sepsis secondary to left-sided pyelonephritis with an associated left-sided nephrolithiasis.  The patient reports she presented to the outside facility initially for some left-sided back/flank pain.  She was not having any associated urinary symptoms at that time.  She does report some subjective fevers and chills.  She was found to have a left-sided nephrolithiasis and was transferred to [facility] for urology evaluation.  At this time, she does report some mild pain.  She is on oxygen at baseline.Rapid response  for decreased mentation, afib 's. Given 1L fld bolus. Transferred to ICU and intubated for airway protection. Taken to surgery today for cystoscopy with left ureteral stent. s/p CYSTOSCOPY LEFT URETERAL STENT INSERTION on . Unable to extubate post-op d/t tongue swelling and no cuff leak. Extubated  and transferred to stepdown unit    Restrictions/Precautions:  Restrictions/Precautions: General Precautions, Fall Risk  Position Activity Restriction  Other position/activity restrictions: monitor O2 & HR (2-4L O2 at baseline)     Social/Functional History:  Lives With: Other (comment)  Type of

## 2024-11-29 NOTE — PROGRESS NOTES
--    Full Upright Position  Small Bite/Sip   Limit Distractions  Monitor for Fatigue     Patient verbally receptive and agreeable to POC. Ongoing education to be provided.       LONG TERM GOALS:  No LTGs established d/t ronna Thompson MA, CCC-SLP [id number]

## 2024-11-29 NOTE — CARE COORDINATION
11/29/24, 1:36 PM EST    DISCHARGE ON GOING EVALUATION    Priya MILIAN [facility] day: 9  Location: 4A-04/004-A Reason for admit: Pyelonephritis [N12]  Sepsis (HCC) [A41.9]     Procedures:   11/20 CVC RIJ - placed at [facility]  11/20 Intubated  11/21 Cystoscopy with left ureteral stent insertion  11/21 ECHO: EF 30-35%; mod global hypokinesis  11/27 PICC line placement  11/29 MBS-passed  Imaging since last note:   11/28 CXR Left lung base opacities. The differential diagnosis includes atelectasis   and pneumonia.     11/29 CXR Left lung base opacities, increased. The differential diagnosis includes   small pleural effusion with atelectasis and pneumonia.     Barriers to Discharge: Diabetes management, PT/OT/SLP, Cardiology following. Nebs, Asa, IV Rocephin, Heparin drip, Flagyl, Zofran prn, electrolyte replacement protocols. Urology following.    PCP: Parmjit Tesfaye MD  Readmission Risk Score: 17.3    Patient Goals/Plan/Treatment Preferences: From home. [place] vs SNF. SW following

## 2024-11-30 LAB
ALBUMIN SERPL BCG-MCNC: 2.6 G/DL (ref 3.5–5.1)
ALP SERPL-CCNC: 61 U/L (ref 38–126)
ALT SERPL W/O P-5'-P-CCNC: 36 U/L (ref 11–66)
ANION GAP SERPL CALC-SCNC: 10 MEQ/L (ref 8–16)
ANION GAP SERPL CALC-SCNC: 7 MEQ/L (ref 8–16)
AST SERPL-CCNC: 20 U/L (ref 5–40)
BACTERIA BLD AEROBE CULT: NORMAL
BACTERIA BLD AEROBE CULT: NORMAL
BACTERIA SPEC RESP CULT: ABNORMAL
BACTERIA SPEC RESP CULT: ABNORMAL
BILIRUB SERPL-MCNC: 0.7 MG/DL (ref 0.3–1.2)
BUN SERPL-MCNC: 30 MG/DL (ref 7–22)
BUN SERPL-MCNC: 33 MG/DL (ref 7–22)
CA-I BLD ISE-SCNC: 1.08 MMOL/L (ref 1.12–1.32)
CALCIUM SERPL-MCNC: 7.4 MG/DL (ref 8.5–10.5)
CALCIUM SERPL-MCNC: 7.7 MG/DL (ref 8.5–10.5)
CHLORIDE SERPL-SCNC: 102 MEQ/L (ref 98–111)
CHLORIDE SERPL-SCNC: 109 MEQ/L (ref 98–111)
CO2 SERPL-SCNC: 29 MEQ/L (ref 23–33)
CO2 SERPL-SCNC: 30 MEQ/L (ref 23–33)
CREAT SERPL-MCNC: 0.9 MG/DL (ref 0.4–1.2)
CREAT SERPL-MCNC: 1 MG/DL (ref 0.4–1.2)
DEPRECATED RDW RBC AUTO: 48.2 FL (ref 35–45)
ERYTHROCYTE [DISTWIDTH] IN BLOOD BY AUTOMATED COUNT: 18 % (ref 11.5–14.5)
GFR SERPL CREATININE-BSD FRML MDRD: 64 ML/MIN/1.73M2
GFR SERPL CREATININE-BSD FRML MDRD: 73 ML/MIN/1.73M2
GLUCOSE BLD STRIP.AUTO-MCNC: 147 MG/DL (ref 70–108)
GLUCOSE BLD STRIP.AUTO-MCNC: 78 MG/DL (ref 70–108)
GLUCOSE BLD STRIP.AUTO-MCNC: 83 MG/DL (ref 70–108)
GLUCOSE SERPL-MCNC: 165 MG/DL (ref 70–108)
GLUCOSE SERPL-MCNC: 71 MG/DL (ref 70–108)
GRAM STN SPEC: ABNORMAL
HCT VFR BLD AUTO: 25.2 % (ref 37–47)
HEPARIN UNFRACTIONATED: 0.33 U/ML (ref 0.3–0.7)
HGB BLD-MCNC: 8 GM/DL (ref 12–16)
MAGNESIUM SERPL-MCNC: 1.6 MG/DL (ref 1.6–2.4)
MCH RBC QN AUTO: 25.9 PG (ref 26–33)
MCHC RBC AUTO-ENTMCNC: 31.7 GM/DL (ref 32.2–35.5)
MCV RBC AUTO: 81.6 FL (ref 81–99)
ORGANISM: ABNORMAL
PHOSPHATE SERPL-MCNC: 3.5 MG/DL (ref 2.4–4.7)
PLATELET # BLD AUTO: 134 THOU/MM3 (ref 130–400)
PMV BLD AUTO: 12.6 FL (ref 9.4–12.4)
POTASSIUM SERPL-SCNC: 3.3 MEQ/L (ref 3.5–5.2)
POTASSIUM SERPL-SCNC: 3.4 MEQ/L (ref 3.5–5.2)
PROT SERPL-MCNC: 5.2 G/DL (ref 6.1–8)
RBC # BLD AUTO: 3.09 MILL/MM3 (ref 4.2–5.4)
SODIUM SERPL-SCNC: 139 MEQ/L (ref 135–145)
SODIUM SERPL-SCNC: 148 MEQ/L (ref 135–145)
WBC # BLD AUTO: 18.8 THOU/MM3 (ref 4.8–10.8)

## 2024-11-30 PROCEDURE — 83735 ASSAY OF MAGNESIUM: CPT

## 2024-11-30 PROCEDURE — 6360000002 HC RX W HCPCS

## 2024-11-30 PROCEDURE — 2700000000 HC OXYGEN THERAPY PER DAY

## 2024-11-30 PROCEDURE — 94640 AIRWAY INHALATION TREATMENT: CPT

## 2024-11-30 PROCEDURE — 6370000000 HC RX 637 (ALT 250 FOR IP)

## 2024-11-30 PROCEDURE — 6370000000 HC RX 637 (ALT 250 FOR IP): Performed by: PHYSICIAN ASSISTANT

## 2024-11-30 PROCEDURE — 2580000003 HC RX 258: Performed by: INTERNAL MEDICINE

## 2024-11-30 PROCEDURE — 82948 REAGENT STRIP/BLOOD GLUCOSE: CPT

## 2024-11-30 PROCEDURE — 6360000002 HC RX W HCPCS: Performed by: UROLOGY

## 2024-11-30 PROCEDURE — 36415 COLL VENOUS BLD VENIPUNCTURE: CPT

## 2024-11-30 PROCEDURE — 6360000002 HC RX W HCPCS: Performed by: INTERNAL MEDICINE

## 2024-11-30 PROCEDURE — 2580000003 HC RX 258: Performed by: PHYSICIAN ASSISTANT

## 2024-11-30 PROCEDURE — 6370000000 HC RX 637 (ALT 250 FOR IP): Performed by: INTERNAL MEDICINE

## 2024-11-30 PROCEDURE — 80053 COMPREHEN METABOLIC PANEL: CPT

## 2024-11-30 PROCEDURE — 85520 HEPARIN ASSAY: CPT

## 2024-11-30 PROCEDURE — 85027 COMPLETE CBC AUTOMATED: CPT

## 2024-11-30 PROCEDURE — 94669 MECHANICAL CHEST WALL OSCILL: CPT

## 2024-11-30 PROCEDURE — 84100 ASSAY OF PHOSPHORUS: CPT

## 2024-11-30 PROCEDURE — 82330 ASSAY OF CALCIUM: CPT

## 2024-11-30 PROCEDURE — 94761 N-INVAS EAR/PLS OXIMETRY MLT: CPT

## 2024-11-30 PROCEDURE — 2060000000 HC ICU INTERMEDIATE R&B

## 2024-11-30 PROCEDURE — 99232 SBSQ HOSP IP/OBS MODERATE 35: CPT | Performed by: INTERNAL MEDICINE

## 2024-11-30 RX ORDER — ALBUTEROL SULFATE 0.83 MG/ML
2.5 SOLUTION RESPIRATORY (INHALATION)
Status: DISCONTINUED | OUTPATIENT
Start: 2024-12-01 | End: 2024-12-03

## 2024-11-30 RX ORDER — DEXTROSE MONOHYDRATE 50 MG/ML
INJECTION, SOLUTION INTRAVENOUS CONTINUOUS
Status: DISCONTINUED | OUTPATIENT
Start: 2024-11-30 | End: 2024-11-30

## 2024-11-30 RX ORDER — INSULIN LISPRO 100 [IU]/ML
0-8 INJECTION, SOLUTION INTRAVENOUS; SUBCUTANEOUS
Status: DISCONTINUED | OUTPATIENT
Start: 2024-11-30 | End: 2024-12-06 | Stop reason: HOSPADM

## 2024-11-30 RX ORDER — INSULIN GLARGINE 100 [IU]/ML
32 INJECTION, SOLUTION SUBCUTANEOUS DAILY
Status: DISCONTINUED | OUTPATIENT
Start: 2024-11-30 | End: 2024-12-01

## 2024-11-30 RX ORDER — OXYMETAZOLINE HYDROCHLORIDE 0.05 G/100ML
2 SPRAY NASAL 3 TIMES DAILY
Status: COMPLETED | OUTPATIENT
Start: 2024-11-30 | End: 2024-11-30

## 2024-11-30 RX ADMIN — POTASSIUM CHLORIDE 40 MEQ: 1500 TABLET, EXTENDED RELEASE ORAL at 15:04

## 2024-11-30 RX ADMIN — OXYMETHAZOLINE HCL 2 SPRAY: 0.05 SPRAY NASAL at 08:55

## 2024-11-30 RX ADMIN — OXYMETHAZOLINE HCL 2 SPRAY: 0.05 SPRAY NASAL at 15:04

## 2024-11-30 RX ADMIN — ALBUTEROL SULFATE 2.5 MG: 2.5 SOLUTION RESPIRATORY (INHALATION) at 07:54

## 2024-11-30 RX ADMIN — OXYMETHAZOLINE HCL 2 SPRAY: 0.05 SPRAY NASAL at 20:17

## 2024-11-30 RX ADMIN — METOPROLOL TARTRATE 25 MG: 25 TABLET, FILM COATED ORAL at 08:55

## 2024-11-30 RX ADMIN — METRONIDAZOLE 500 MG: 500 TABLET ORAL at 06:11

## 2024-11-30 RX ADMIN — SODIUM CHLORIDE, PRESERVATIVE FREE 10 ML: 5 INJECTION INTRAVENOUS at 08:51

## 2024-11-30 RX ADMIN — BUDESONIDE 250 MCG: 0.25 INHALANT RESPIRATORY (INHALATION) at 22:48

## 2024-11-30 RX ADMIN — HEPARIN SODIUM 11 UNITS/KG/HR: 10000 INJECTION, SOLUTION INTRAVENOUS at 06:15

## 2024-11-30 RX ADMIN — METRONIDAZOLE 500 MG: 500 TABLET ORAL at 15:04

## 2024-11-30 RX ADMIN — BUDESONIDE 250 MCG: 0.25 INHALANT RESPIRATORY (INHALATION) at 07:54

## 2024-11-30 RX ADMIN — ALBUTEROL SULFATE 2.5 MG: 2.5 SOLUTION RESPIRATORY (INHALATION) at 22:48

## 2024-11-30 RX ADMIN — ATORVASTATIN CALCIUM 40 MG: 40 TABLET, FILM COATED ORAL at 20:17

## 2024-11-30 RX ADMIN — ASPIRIN 81 MG: 81 TABLET, CHEWABLE ORAL at 08:51

## 2024-11-30 RX ADMIN — POTASSIUM BICARBONATE 40 MEQ: 782 TABLET, EFFERVESCENT ORAL at 06:11

## 2024-11-30 RX ADMIN — TIOTROPIUM BROMIDE AND OLODATEROL 2 PUFF: 3.124; 2.736 SPRAY, METERED RESPIRATORY (INHALATION) at 07:54

## 2024-11-30 RX ADMIN — WATER 1000 MG: 1 INJECTION INTRAMUSCULAR; INTRAVENOUS; SUBCUTANEOUS at 08:44

## 2024-11-30 RX ADMIN — DEXTROSE MONOHYDRATE: 50 INJECTION, SOLUTION INTRAVENOUS at 08:52

## 2024-11-30 RX ADMIN — SODIUM CHLORIDE, PRESERVATIVE FREE 10 ML: 5 INJECTION INTRAVENOUS at 20:17

## 2024-11-30 RX ADMIN — INSULIN GLARGINE 32 UNITS: 100 INJECTION, SOLUTION SUBCUTANEOUS at 08:51

## 2024-11-30 RX ADMIN — METRONIDAZOLE 500 MG: 500 TABLET ORAL at 21:57

## 2024-11-30 ASSESSMENT — PAIN SCALES - GENERAL: PAINLEVEL_OUTOF10: 0

## 2024-11-30 NOTE — RT PROTOCOL NOTE
RT Nebulizer Bronchodilator Protocol Note    There is a bronchodilator order in the chart from a provider indicating to follow the RT Bronchodilator Protocol and there is an “Initiate RT Bronchodilator Protocol” order as well (see protocol at bottom of note).    CXR Findings:  XR CHEST PORTABLE    Result Date: 11/29/2024  Left lung base opacities, increased. The differential diagnosis includes small pleural effusion with atelectasis and pneumonia. The remainder of the examination is unchanged. This document has been electronically signed by: Eitan Jacobs MD on 11/29/2024 03:23 AM    XR CHEST PORTABLE    Result Date: 11/28/2024  Left lung base opacities. The differential diagnosis includes atelectasis and pneumonia. The remainder of the examination is unchanged. This document has been electronically signed by: Eitan Jacobs MD on 11/28/2024 03:35 AM      The findings from the last RT Protocol Assessment were as follows:  Smoking: Chronic pulmonary disease  Respiratory Pattern: Regular pattern and RR 12-20 bpm  Breath Sounds: Slightly diminished and/or crackles  Cough: Strong, spontaneous, non-productive  Indication for Bronchodilator Therapy: Decreased or absent breath sounds  Bronchodilator Assessment Score: 4    Aerosolized bronchodilator medication orders have been revised according to the RT Nebulizer Bronchodilator Protocol below.    Respiratory Therapist to perform RT Therapy Protocol Assessment initially then follow the protocol.  Repeat RT Therapy Protocol Assessment PRN for score 0-3 or on second treatment, BID, and PRN for scores above 3.    No Indications - adjust the frequency to every 6 hours PRN wheezing or bronchospasm, if no treatments needed after 48 hours then discontinue using Per Protocol order mode.     If indication present, adjust the RT bronchodilator orders based on the Bronchodilator Assessment Score as indicated below.  If a patient is on this medication at home then do not decrease Frequency

## 2024-11-30 NOTE — PLAN OF CARE
Problem: Respiratory - Adult  Goal: Achieves optimal ventilation and oxygenation  11/29/2024 1959 by Luis Moore RCP  Outcome: Progressing     Problem: Respiratory - Adult  Goal: Clear lung sounds  11/29/2024 1959 by Luis Moore RCP  Outcome: Progressing

## 2024-11-30 NOTE — PLAN OF CARE
Problem: Respiratory - Adult  Goal: Clear lung sounds  11/30/2024 0811 by Paige Metzger, RCREGINO  Outcome: Progressing    Continue tx's to improve breath sounds, increase aeration and decrease WOB.

## 2024-11-30 NOTE — PLAN OF CARE
Problem: Discharge Planning  Goal: Discharge to home or other facility with appropriate resources  Outcome: Progressing  Flowsheets (Taken 11/30/2024 0850)  Discharge to home or other facility with appropriate resources: Arrange for needed discharge resources and transportation as appropriate     Problem: Pain  Goal: Verbalizes/displays adequate comfort level or baseline comfort level  Outcome: Progressing     Problem: Neurosensory - Adult  Goal: Achieves stable or improved neurological status  Outcome: Progressing  Flowsheets (Taken 11/30/2024 0850)  Achieves stable or improved neurological status: Assess for and report changes in neurological status  Goal: Achieves maximal functionality and self care  Outcome: Progressing  Flowsheets (Taken 11/30/2024 0850)  Achieves maximal functionality and self care: Encourage visually impaired, hearing impaired and aphasic patients to use assistive/communication devices     Problem: Respiratory - Adult  Goal: Achieves optimal ventilation and oxygenation  Outcome: Progressing  Flowsheets (Taken 11/30/2024 0850)  Achieves optimal ventilation and oxygenation: Position to facilitate oxygenation and minimize respiratory effort  Goal: Clear lung sounds  11/30/2024 1631 by Ban Ellsworth RN  Outcome: Progressing  11/30/2024 0811 by Paige Metzger REGINO  Outcome: Progressing     Problem: Cardiovascular - Adult  Goal: Maintains optimal cardiac output and hemodynamic stability  Outcome: Progressing  Flowsheets (Taken 11/30/2024 0850)  Maintains optimal cardiac output and hemodynamic stability: Assess for signs of decreased cardiac output  Goal: Absence of cardiac dysrhythmias or at baseline  Outcome: Progressing  Flowsheets (Taken 11/30/2024 0850)  Absence of cardiac dysrhythmias or at baseline: Assess for signs of decreased cardiac output     Problem: Skin/Tissue Integrity - Adult  Goal: Skin integrity remains intact  Outcome: Progressing  Flowsheets (Taken 11/30/2024 0850)  Skin  psychosis) are managed with adequate functional status: Lanse high risk fall precautions, as indicated     Problem: Nutrition Deficit:  Goal: Optimize nutritional status  Outcome: Progressing   Care plan reviewed with patient. Patient verbalizes understanding of the plan of care and contributed to goal setting.

## 2024-11-30 NOTE — RT PROTOCOL NOTE
RT Inhaler-Nebulizer Bronchodilator Protocol Note    There is a bronchodilator order in the chart from a provider indicating to follow the RT Bronchodilator Protocol and there is an “Initiate RT Inhaler-Nebulizer Bronchodilator Protocol” order as well (see protocol at bottom of note).    CXR Findings:  XR CHEST PORTABLE    Result Date: 11/29/2024  Left lung base opacities, increased. The differential diagnosis includes small pleural effusion with atelectasis and pneumonia. The remainder of the examination is unchanged. This document has been electronically signed by: Eitan Jacobs MD on 11/29/2024 03:23 AM      The findings from the last RT Protocol Assessment were as follows:   History Pulmonary Disease: Chronic pulmonary disease  Respiratory Pattern: Dyspnea on exertion or RR 21-25 bpm  Breath Sounds: Slightly diminished and/or crackles  Cough: Strong, spontaneous, non-productive  Indication for Bronchodilator Therapy: Decreased or absent breath sounds  Bronchodilator Assessment Score: 6    Aerosolized bronchodilator medication orders have been revised according to the RT Inhaler-Nebulizer Bronchodilator Protocol below.    Respiratory Therapist to perform RT Therapy Protocol Assessment initially then follow the protocol.  Repeat RT Therapy Protocol Assessment PRN for score 0-3 or on second treatment, BID, and PRN for scores above 3.    No Indications - adjust the frequency to every 6 hours PRN wheezing or bronchospasm, if no treatments needed after 48 hours then discontinue using Per Protocol order mode.     If indication present, adjust the RT bronchodilator orders based on the Bronchodilator Assessment Score as indicated below.  Use Inhaler orders unless patient has one or more of the following: on home nebulizer, not able to hold breath for 10 seconds, is not alert and oriented, cannot activate and use MDI correctly, or respiratory rate 25 breaths per minute or more, then use the equivalent nebulizer order(s) with  same Frequency and PRN reasons based on the score.  If a patient is on this medication at home then do not decrease Frequency below that used at home.    0-3 - enter or revise RT bronchodilator order(s) to equivalent RT Bronchodilator order with Frequency of every 4 hours PRN for wheezing or increased work of breathing using Per Protocol order mode.        4-6 - enter or revise RT Bronchodilator order(s) to two equivalent RT bronchodilator orders with one order with BID Frequency and one order with Frequency of every 4 hours PRN wheezing or increased work of breathing using Per Protocol order mode.        7-10 - enter or revise RT Bronchodilator order(s) to two equivalent RT bronchodilator orders with one order with TID Frequency and one order with Frequency of every 4 hours PRN wheezing or increased work of breathing using Per Protocol order mode.       11-13 - enter or revise RT Bronchodilator order(s) to one equivalent RT bronchodilator order with QID Frequency and an Albuterol order with Frequency of every 4 hours PRN wheezing or increased work of breathing using Per Protocol order mode.      Greater than 13 - enter or revise RT Bronchodilator order(s) to one equivalent RT bronchodilator order with every 4 hours Frequency and an Albuterol order with Frequency of every 2 hours PRN wheezing or increased work of breathing using Per Protocol order mode.     RT to enter RT Home Evaluation for COPD & MDI Assessment order using Per Protocol order mode.    Electronically signed by Paige Metzger RCP on 11/30/2024 at 8:12 AM

## 2024-12-01 LAB
ANION GAP SERPL CALC-SCNC: 8 MEQ/L (ref 8–16)
ANION GAP SERPL CALC-SCNC: 9 MEQ/L (ref 8–16)
BUN SERPL-MCNC: 26 MG/DL (ref 7–22)
BUN SERPL-MCNC: 27 MG/DL (ref 7–22)
CALCIUM SERPL-MCNC: 7.4 MG/DL (ref 8.5–10.5)
CALCIUM SERPL-MCNC: 7.9 MG/DL (ref 8.5–10.5)
CHLORIDE SERPL-SCNC: 103 MEQ/L (ref 98–111)
CHLORIDE SERPL-SCNC: 106 MEQ/L (ref 98–111)
CO2 SERPL-SCNC: 29 MEQ/L (ref 23–33)
CO2 SERPL-SCNC: 31 MEQ/L (ref 23–33)
CREAT SERPL-MCNC: 0.9 MG/DL (ref 0.4–1.2)
CREAT SERPL-MCNC: 0.9 MG/DL (ref 0.4–1.2)
DEPRECATED RDW RBC AUTO: 47.9 FL (ref 35–45)
ERYTHROCYTE [DISTWIDTH] IN BLOOD BY AUTOMATED COUNT: 18.5 % (ref 11.5–14.5)
GFR SERPL CREATININE-BSD FRML MDRD: 73 ML/MIN/1.73M2
GFR SERPL CREATININE-BSD FRML MDRD: 73 ML/MIN/1.73M2
GLUCOSE BLD STRIP.AUTO-MCNC: 118 MG/DL (ref 70–108)
GLUCOSE BLD STRIP.AUTO-MCNC: 121 MG/DL (ref 70–108)
GLUCOSE BLD STRIP.AUTO-MCNC: 78 MG/DL (ref 70–108)
GLUCOSE SERPL-MCNC: 190 MG/DL (ref 70–108)
GLUCOSE SERPL-MCNC: 82 MG/DL (ref 70–108)
HCT VFR BLD AUTO: 25.5 % (ref 37–47)
HEPARIN UNFRACTIONATED: 0.21 U/ML (ref 0.3–0.7)
HEPARIN UNFRACTIONATED: 0.3 U/ML (ref 0.3–0.7)
HEPARIN UNFRACTIONATED: 0.4 U/ML (ref 0.3–0.7)
HGB BLD-MCNC: 7.9 GM/DL (ref 12–16)
MCH RBC QN AUTO: 24.7 PG (ref 26–33)
MCHC RBC AUTO-ENTMCNC: 31 GM/DL (ref 32.2–35.5)
MCV RBC AUTO: 79.7 FL (ref 81–99)
PLATELET # BLD AUTO: 148 THOU/MM3 (ref 130–400)
PMV BLD AUTO: 11.7 FL (ref 9.4–12.4)
POTASSIUM SERPL-SCNC: 3.8 MEQ/L (ref 3.5–5.2)
POTASSIUM SERPL-SCNC: 4.2 MEQ/L (ref 3.5–5.2)
RBC # BLD AUTO: 3.2 MILL/MM3 (ref 4.2–5.4)
SODIUM SERPL-SCNC: 141 MEQ/L (ref 135–145)
SODIUM SERPL-SCNC: 145 MEQ/L (ref 135–145)
WBC # BLD AUTO: 14.6 THOU/MM3 (ref 4.8–10.8)

## 2024-12-01 PROCEDURE — 2060000000 HC ICU INTERMEDIATE R&B

## 2024-12-01 PROCEDURE — 80048 BASIC METABOLIC PNL TOTAL CA: CPT

## 2024-12-01 PROCEDURE — 6370000000 HC RX 637 (ALT 250 FOR IP): Performed by: INTERNAL MEDICINE

## 2024-12-01 PROCEDURE — 6360000002 HC RX W HCPCS: Performed by: INTERNAL MEDICINE

## 2024-12-01 PROCEDURE — 85520 HEPARIN ASSAY: CPT

## 2024-12-01 PROCEDURE — 6370000000 HC RX 637 (ALT 250 FOR IP)

## 2024-12-01 PROCEDURE — 36415 COLL VENOUS BLD VENIPUNCTURE: CPT

## 2024-12-01 PROCEDURE — 2580000003 HC RX 258: Performed by: INTERNAL MEDICINE

## 2024-12-01 PROCEDURE — 6360000002 HC RX W HCPCS

## 2024-12-01 PROCEDURE — 82948 REAGENT STRIP/BLOOD GLUCOSE: CPT

## 2024-12-01 PROCEDURE — 6360000002 HC RX W HCPCS: Performed by: UROLOGY

## 2024-12-01 PROCEDURE — 2700000000 HC OXYGEN THERAPY PER DAY

## 2024-12-01 PROCEDURE — 94669 MECHANICAL CHEST WALL OSCILL: CPT

## 2024-12-01 PROCEDURE — 2580000003 HC RX 258: Performed by: PHYSICIAN ASSISTANT

## 2024-12-01 PROCEDURE — 85027 COMPLETE CBC AUTOMATED: CPT

## 2024-12-01 PROCEDURE — 99232 SBSQ HOSP IP/OBS MODERATE 35: CPT | Performed by: INTERNAL MEDICINE

## 2024-12-01 PROCEDURE — 94761 N-INVAS EAR/PLS OXIMETRY MLT: CPT

## 2024-12-01 PROCEDURE — 94640 AIRWAY INHALATION TREATMENT: CPT

## 2024-12-01 RX ORDER — BUMETANIDE 0.25 MG/ML
1 INJECTION, SOLUTION INTRAMUSCULAR; INTRAVENOUS 2 TIMES DAILY
Status: DISCONTINUED | OUTPATIENT
Start: 2024-12-01 | End: 2024-12-03

## 2024-12-01 RX ORDER — INSULIN GLARGINE 100 [IU]/ML
25 INJECTION, SOLUTION SUBCUTANEOUS DAILY
Status: DISCONTINUED | OUTPATIENT
Start: 2024-12-02 | End: 2024-12-06 | Stop reason: HOSPADM

## 2024-12-01 RX ADMIN — METRONIDAZOLE 500 MG: 500 TABLET ORAL at 23:37

## 2024-12-01 RX ADMIN — ASPIRIN 81 MG: 81 TABLET, CHEWABLE ORAL at 08:24

## 2024-12-01 RX ADMIN — TIOTROPIUM BROMIDE AND OLODATEROL 2 PUFF: 3.124; 2.736 SPRAY, METERED RESPIRATORY (INHALATION) at 07:41

## 2024-12-01 RX ADMIN — INSULIN GLARGINE 32 UNITS: 100 INJECTION, SOLUTION SUBCUTANEOUS at 08:24

## 2024-12-01 RX ADMIN — SODIUM CHLORIDE, PRESERVATIVE FREE 10 ML: 5 INJECTION INTRAVENOUS at 20:11

## 2024-12-01 RX ADMIN — ALBUTEROL SULFATE 2.5 MG: 2.5 SOLUTION RESPIRATORY (INHALATION) at 07:41

## 2024-12-01 RX ADMIN — BUDESONIDE 250 MCG: 0.25 INHALANT RESPIRATORY (INHALATION) at 21:03

## 2024-12-01 RX ADMIN — METRONIDAZOLE 500 MG: 500 TABLET ORAL at 07:05

## 2024-12-01 RX ADMIN — WATER 1000 MG: 1 INJECTION INTRAMUSCULAR; INTRAVENOUS; SUBCUTANEOUS at 08:25

## 2024-12-01 RX ADMIN — SODIUM CHLORIDE, PRESERVATIVE FREE 10 ML: 5 INJECTION INTRAVENOUS at 08:24

## 2024-12-01 RX ADMIN — HEPARIN SODIUM 2000 UNITS: 1000 INJECTION INTRAVENOUS; SUBCUTANEOUS at 04:15

## 2024-12-01 RX ADMIN — ATORVASTATIN CALCIUM 40 MG: 40 TABLET, FILM COATED ORAL at 20:11

## 2024-12-01 RX ADMIN — METOPROLOL TARTRATE 25 MG: 25 TABLET, FILM COATED ORAL at 08:24

## 2024-12-01 RX ADMIN — HEPARIN SODIUM 13 UNITS/KG/HR: 10000 INJECTION, SOLUTION INTRAVENOUS at 06:32

## 2024-12-01 RX ADMIN — BUDESONIDE 250 MCG: 0.25 INHALANT RESPIRATORY (INHALATION) at 07:41

## 2024-12-01 RX ADMIN — ALBUTEROL SULFATE 2.5 MG: 2.5 SOLUTION RESPIRATORY (INHALATION) at 20:56

## 2024-12-01 RX ADMIN — HYDROXYZINE HYDROCHLORIDE 10 MG: 10 TABLET ORAL at 20:11

## 2024-12-01 RX ADMIN — BUMETANIDE 1 MG: 0.25 INJECTION INTRAMUSCULAR; INTRAVENOUS at 16:18

## 2024-12-01 RX ADMIN — METRONIDAZOLE 500 MG: 500 TABLET ORAL at 16:18

## 2024-12-01 RX ADMIN — BUMETANIDE 1 MG: 0.25 INJECTION INTRAMUSCULAR; INTRAVENOUS at 11:16

## 2024-12-01 RX ADMIN — ALBUTEROL SULFATE 2.5 MG: 2.5 SOLUTION RESPIRATORY (INHALATION) at 13:01

## 2024-12-01 ASSESSMENT — PAIN SCALES - GENERAL: PAINLEVEL_OUTOF10: 0

## 2024-12-01 NOTE — RT PROTOCOL NOTE
RT Inhaler-Nebulizer Bronchodilator Protocol Note    There is a bronchodilator order in the chart from a provider indicating to follow the RT Bronchodilator Protocol and there is an “Initiate RT Inhaler-Nebulizer Bronchodilator Protocol” order as well (see protocol at bottom of note).    CXR Findings:  XR CHEST PORTABLE    Result Date: 11/29/2024  Left lung base opacities, increased. The differential diagnosis includes small pleural effusion with atelectasis and pneumonia. The remainder of the examination is unchanged. This document has been electronically signed by: Eitan Jacobs MD on 11/29/2024 03:23 AM      The findings from the last RT Protocol Assessment were as follows:   History Pulmonary Disease: Chronic pulmonary disease  Respiratory Pattern: Dyspnea on exertion or RR 21-25 bpm  Breath Sounds: Intermittent or unilateral wheezes  Cough: Strong, spontaneous, non-productive  Indication for Bronchodilator Therapy: Decreased or absent breath sounds, Wheezing associated with pulm disorder  Bronchodilator Assessment Score: 8    Aerosolized bronchodilator medication orders have been revised according to the RT Inhaler-Nebulizer Bronchodilator Protocol below.    Respiratory Therapist to perform RT Therapy Protocol Assessment initially then follow the protocol.  Repeat RT Therapy Protocol Assessment PRN for score 0-3 or on second treatment, BID, and PRN for scores above 3.    No Indications - adjust the frequency to every 6 hours PRN wheezing or bronchospasm, if no treatments needed after 48 hours then discontinue using Per Protocol order mode.     If indication present, adjust the RT bronchodilator orders based on the Bronchodilator Assessment Score as indicated below.  Use Inhaler orders unless patient has one or more of the following: on home nebulizer, not able to hold breath for 10 seconds, is not alert and oriented, cannot activate and use MDI correctly, or respiratory rate 25 breaths per minute or more, then use

## 2024-12-01 NOTE — RT PROTOCOL NOTE
RT Inhaler-Nebulizer Bronchodilator Protocol Note    There is a bronchodilator order in the chart from a provider indicating to follow the RT Bronchodilator Protocol and there is an “Initiate RT Inhaler-Nebulizer Bronchodilator Protocol” order as well (see protocol at bottom of note).    CXR Findings:  No results found.    The findings from the last RT Protocol Assessment were as follows:   History Pulmonary Disease: Chronic pulmonary disease  Respiratory Pattern: Dyspnea on exertion or RR 21-25 bpm  Breath Sounds: Slightly diminished and/or crackles  Cough: Strong, productive  Indication for Bronchodilator Therapy: Decreased or absent breath sounds, Wheezing associated with pulm disorder  Bronchodilator Assessment Score: 7    Aerosolized bronchodilator medication orders have been revised according to the RT Inhaler-Nebulizer Bronchodilator Protocol below.    Respiratory Therapist to perform RT Therapy Protocol Assessment initially then follow the protocol.  Repeat RT Therapy Protocol Assessment PRN for score 0-3 or on second treatment, BID, and PRN for scores above 3.    No Indications - adjust the frequency to every 6 hours PRN wheezing or bronchospasm, if no treatments needed after 48 hours then discontinue using Per Protocol order mode.     If indication present, adjust the RT bronchodilator orders based on the Bronchodilator Assessment Score as indicated below.  Use Inhaler orders unless patient has one or more of the following: on home nebulizer, not able to hold breath for 10 seconds, is not alert and oriented, cannot activate and use MDI correctly, or respiratory rate 25 breaths per minute or more, then use the equivalent nebulizer order(s) with same Frequency and PRN reasons based on the score.  If a patient is on this medication at home then do not decrease Frequency below that used at home.    0-3 - enter or revise RT bronchodilator order(s) to equivalent RT Bronchodilator order with Frequency of every 4

## 2024-12-01 NOTE — PLAN OF CARE
Problem: Respiratory - Adult  Goal: Clear lung sounds  12/1/2024 0746 by Paige Metzger, RCREGINO  Outcome: Progressing    Continue tx's to improve breath sounds, increase aeration and decrease WOB.

## 2024-12-01 NOTE — PROGRESS NOTES
Hospitalist Progress Note      [facility]    [facility]-04/004-A  YOB: 1965  MRN: [id number]   Acct: [id number]   PCP: Parmjit Tesfaye MD  Date of Admission: 11/20/2024    Assessment/Plan:    Sepsis secondary to left nephrolithiasis complicated by left pyelonephritis  Septic shock - resolved  CT scan at outside facility showed nephrolithiasis on the left side, and cystic characteristic on the right kidney.  Leukocytosis worsening, 29.3 from 19.5, secondary to steroids. Urine culture positive for E.coli. (11/20). Bclx NGTD. Respiratory panel negative (11/25). UA(11/25) reported no caast, WBC>100, RBC>200. Admitted to the ICU on pressors. Has been successfully weaned off. On cefepime.   Clinically improving, hemodynamically stable and afebrile.  Remains off pressors.  Leukocytosis continues to downtrend, currently 14.6.  Previous infectious workup with urine culture from 11/20 growing pansensitive E coli.   Patient previously on cefepime, switched to CTX and flagyl, plan for 7 days of therapy (suspected aspiration pneumonia)  EOT 12/4/24  Salazar with red tinged urine.  Will discuss with urology regarding neurologic clearance for left heart cath  Urology following, maintain IUBC for now  Will need definitive stone mgmt in few weeks, likely outpatient.     Acute on chronic hypoxic hypercapnic respiratory failure in setting of aspiration pneumonia  On 2 L oxygen at home.  Intubated on 11/20 for airway protection. CXR 11/26 shows improvement with right opacity and infiltrates. Mild improvement with left pleural effusion.   Respiratory status improving, weaned back to baseline 2L 11/30/2024  WBC downtrending  As above, treat for aspiration pneumonia  Continue ceftriaxone, flagyl, end date 12/4/24  ETT culture with light growth of yeast, likely candida. Likely colonization and would not treat in this situation based on

## 2024-12-01 NOTE — PLAN OF CARE
Problem: Respiratory - Adult  Goal: Clear lung sounds  11/30/2024 2313 by Whitney Mata, RCP  Outcome: Progressing   Receiving nebulizer treatments to improve aeration. Will continue with therapies as ordered.  Patient mutually agreed on goals.

## 2024-12-02 LAB
ABO: NORMAL
ANION GAP SERPL CALC-SCNC: 7 MEQ/L (ref 8–16)
ANTIBODY SCREEN: NORMAL
BUN SERPL-MCNC: 22 MG/DL (ref 7–22)
CALCIUM SERPL-MCNC: 7.4 MG/DL (ref 8.5–10.5)
CHLORIDE SERPL-SCNC: 102 MEQ/L (ref 98–111)
CO2 SERPL-SCNC: 31 MEQ/L (ref 23–33)
CREAT SERPL-MCNC: 0.9 MG/DL (ref 0.4–1.2)
DEPRECATED RDW RBC AUTO: 48.9 FL (ref 35–45)
ERYTHROCYTE [DISTWIDTH] IN BLOOD BY AUTOMATED COUNT: 18.4 % (ref 11.5–14.5)
GFR SERPL CREATININE-BSD FRML MDRD: 73 ML/MIN/1.73M2
GLUCOSE BLD STRIP.AUTO-MCNC: 104 MG/DL (ref 70–108)
GLUCOSE BLD STRIP.AUTO-MCNC: 107 MG/DL (ref 70–108)
GLUCOSE BLD STRIP.AUTO-MCNC: 127 MG/DL (ref 70–108)
GLUCOSE BLD STRIP.AUTO-MCNC: 205 MG/DL (ref 70–108)
GLUCOSE SERPL-MCNC: 107 MG/DL (ref 70–108)
HCT VFR BLD AUTO: 22.9 % (ref 37–47)
HCT VFR BLD AUTO: 25 % (ref 37–47)
HEPARIN UNFRACTIONATED: 0.26 U/ML (ref 0.3–0.7)
HEPARIN UNFRACTIONATED: 0.45 U/ML (ref 0.3–0.7)
HEPARIN UNFRACTIONATED: 0.5 U/ML (ref 0.3–0.7)
HGB BLD-MCNC: 7.1 GM/DL (ref 12–16)
HGB BLD-MCNC: 7.7 GM/DL (ref 12–16)
MAGNESIUM SERPL-MCNC: 1.4 MG/DL (ref 1.6–2.4)
MCH RBC QN AUTO: 25.2 PG (ref 26–33)
MCHC RBC AUTO-ENTMCNC: 31 GM/DL (ref 32.2–35.5)
MCV RBC AUTO: 81.2 FL (ref 81–99)
PLATELET # BLD AUTO: 137 THOU/MM3 (ref 130–400)
PMV BLD AUTO: 11.7 FL (ref 9.4–12.4)
POTASSIUM SERPL-SCNC: 3.5 MEQ/L (ref 3.5–5.2)
RBC # BLD AUTO: 2.82 MILL/MM3 (ref 4.2–5.4)
RH FACTOR: NORMAL
SODIUM SERPL-SCNC: 140 MEQ/L (ref 135–145)
WBC # BLD AUTO: 11 THOU/MM3 (ref 4.8–10.8)

## 2024-12-02 PROCEDURE — 97535 SELF CARE MNGMENT TRAINING: CPT

## 2024-12-02 PROCEDURE — 2580000003 HC RX 258: Performed by: PHYSICIAN ASSISTANT

## 2024-12-02 PROCEDURE — 83735 ASSAY OF MAGNESIUM: CPT

## 2024-12-02 PROCEDURE — 85520 HEPARIN ASSAY: CPT

## 2024-12-02 PROCEDURE — 85027 COMPLETE CBC AUTOMATED: CPT

## 2024-12-02 PROCEDURE — 99233 SBSQ HOSP IP/OBS HIGH 50: CPT | Performed by: INTERNAL MEDICINE

## 2024-12-02 PROCEDURE — 80048 BASIC METABOLIC PNL TOTAL CA: CPT

## 2024-12-02 PROCEDURE — 99232 SBSQ HOSP IP/OBS MODERATE 35: CPT | Performed by: NURSE PRACTITIONER

## 2024-12-02 PROCEDURE — 36415 COLL VENOUS BLD VENIPUNCTURE: CPT

## 2024-12-02 PROCEDURE — 2580000003 HC RX 258: Performed by: INTERNAL MEDICINE

## 2024-12-02 PROCEDURE — 82948 REAGENT STRIP/BLOOD GLUCOSE: CPT

## 2024-12-02 PROCEDURE — 6360000002 HC RX W HCPCS

## 2024-12-02 PROCEDURE — 6370000000 HC RX 637 (ALT 250 FOR IP): Performed by: INTERNAL MEDICINE

## 2024-12-02 PROCEDURE — 6360000002 HC RX W HCPCS: Performed by: INTERNAL MEDICINE

## 2024-12-02 PROCEDURE — 86900 BLOOD TYPING SEROLOGIC ABO: CPT

## 2024-12-02 PROCEDURE — 86901 BLOOD TYPING SEROLOGIC RH(D): CPT

## 2024-12-02 PROCEDURE — 94640 AIRWAY INHALATION TREATMENT: CPT

## 2024-12-02 PROCEDURE — 85014 HEMATOCRIT: CPT

## 2024-12-02 PROCEDURE — 6370000000 HC RX 637 (ALT 250 FOR IP): Performed by: NURSE PRACTITIONER

## 2024-12-02 PROCEDURE — 2700000000 HC OXYGEN THERAPY PER DAY

## 2024-12-02 PROCEDURE — 6370000000 HC RX 637 (ALT 250 FOR IP)

## 2024-12-02 PROCEDURE — 85018 HEMOGLOBIN: CPT

## 2024-12-02 PROCEDURE — 94761 N-INVAS EAR/PLS OXIMETRY MLT: CPT

## 2024-12-02 PROCEDURE — 86923 COMPATIBILITY TEST ELECTRIC: CPT

## 2024-12-02 PROCEDURE — 1200000003 HC TELEMETRY R&B

## 2024-12-02 PROCEDURE — 97530 THERAPEUTIC ACTIVITIES: CPT

## 2024-12-02 PROCEDURE — 6360000002 HC RX W HCPCS: Performed by: UROLOGY

## 2024-12-02 PROCEDURE — 97116 GAIT TRAINING THERAPY: CPT

## 2024-12-02 PROCEDURE — 86850 RBC ANTIBODY SCREEN: CPT

## 2024-12-02 PROCEDURE — P9016 RBC LEUKOCYTES REDUCED: HCPCS

## 2024-12-02 RX ORDER — METOPROLOL SUCCINATE 25 MG/1
25 TABLET, EXTENDED RELEASE ORAL DAILY
Status: DISCONTINUED | OUTPATIENT
Start: 2024-12-03 | End: 2024-12-06 | Stop reason: HOSPADM

## 2024-12-02 RX ORDER — MAGNESIUM SULFATE IN WATER 40 MG/ML
4000 INJECTION, SOLUTION INTRAVENOUS ONCE
Status: COMPLETED | OUTPATIENT
Start: 2024-12-02 | End: 2024-12-02

## 2024-12-02 RX ORDER — SPIRONOLACTONE 25 MG/1
25 TABLET ORAL DAILY
Status: DISCONTINUED | OUTPATIENT
Start: 2024-12-02 | End: 2024-12-06 | Stop reason: HOSPADM

## 2024-12-02 RX ADMIN — BUMETANIDE 1 MG: 0.25 INJECTION INTRAMUSCULAR; INTRAVENOUS at 18:00

## 2024-12-02 RX ADMIN — ALBUTEROL SULFATE 2.5 MG: 2.5 SOLUTION RESPIRATORY (INHALATION) at 14:37

## 2024-12-02 RX ADMIN — METRONIDAZOLE 500 MG: 500 TABLET ORAL at 21:55

## 2024-12-02 RX ADMIN — SENNOSIDES 8.8 MG: 8.8 LIQUID ORAL at 21:55

## 2024-12-02 RX ADMIN — INSULIN GLARGINE 25 UNITS: 100 INJECTION, SOLUTION SUBCUTANEOUS at 09:48

## 2024-12-02 RX ADMIN — INSULIN LISPRO 2 UNITS: 100 INJECTION, SOLUTION INTRAVENOUS; SUBCUTANEOUS at 12:02

## 2024-12-02 RX ADMIN — POTASSIUM BICARBONATE 40 MEQ: 782 TABLET, EFFERVESCENT ORAL at 09:49

## 2024-12-02 RX ADMIN — ASPIRIN 81 MG: 81 TABLET, CHEWABLE ORAL at 09:48

## 2024-12-02 RX ADMIN — HEPARIN SODIUM 15 UNITS/KG/HR: 10000 INJECTION, SOLUTION INTRAVENOUS at 20:57

## 2024-12-02 RX ADMIN — METOPROLOL TARTRATE 25 MG: 25 TABLET, FILM COATED ORAL at 09:48

## 2024-12-02 RX ADMIN — METRONIDAZOLE 500 MG: 500 TABLET ORAL at 06:03

## 2024-12-02 RX ADMIN — BUDESONIDE 250 MCG: 0.25 INHALANT RESPIRATORY (INHALATION) at 09:11

## 2024-12-02 RX ADMIN — HEPARIN SODIUM 2000 UNITS: 1000 INJECTION INTRAVENOUS; SUBCUTANEOUS at 06:04

## 2024-12-02 RX ADMIN — SPIRONOLACTONE 25 MG: 25 TABLET ORAL at 11:54

## 2024-12-02 RX ADMIN — WATER 1000 MG: 1 INJECTION INTRAMUSCULAR; INTRAVENOUS; SUBCUTANEOUS at 11:54

## 2024-12-02 RX ADMIN — SODIUM CHLORIDE, PRESERVATIVE FREE 10 ML: 5 INJECTION INTRAVENOUS at 11:51

## 2024-12-02 RX ADMIN — ALBUTEROL SULFATE 2.5 MG: 2.5 SOLUTION RESPIRATORY (INHALATION) at 09:11

## 2024-12-02 RX ADMIN — HEPARIN SODIUM 13 UNITS/KG/HR: 10000 INJECTION, SOLUTION INTRAVENOUS at 02:51

## 2024-12-02 RX ADMIN — DOCUSATE SODIUM 100 MG: 50 LIQUID ORAL at 09:48

## 2024-12-02 RX ADMIN — METRONIDAZOLE 500 MG: 500 TABLET ORAL at 14:55

## 2024-12-02 RX ADMIN — SODIUM CHLORIDE, PRESERVATIVE FREE 10 ML: 5 INJECTION INTRAVENOUS at 21:56

## 2024-12-02 RX ADMIN — BUMETANIDE 1 MG: 0.25 INJECTION INTRAMUSCULAR; INTRAVENOUS at 10:43

## 2024-12-02 RX ADMIN — METOPROLOL TARTRATE 25 MG: 25 TABLET, FILM COATED ORAL at 21:55

## 2024-12-02 RX ADMIN — POLYETHYLENE GLYCOL 3350 17 G: 17 POWDER, FOR SOLUTION ORAL at 09:48

## 2024-12-02 RX ADMIN — ATORVASTATIN CALCIUM 40 MG: 40 TABLET, FILM COATED ORAL at 21:55

## 2024-12-02 RX ADMIN — TIOTROPIUM BROMIDE AND OLODATEROL 2 PUFF: 3.124; 2.736 SPRAY, METERED RESPIRATORY (INHALATION) at 09:11

## 2024-12-02 RX ADMIN — MAGNESIUM SULFATE HEPTAHYDRATE 4000 MG: 40 INJECTION, SOLUTION INTRAVENOUS at 10:10

## 2024-12-02 NOTE — PROGRESS NOTES
Occupational Therapy  [facility]  [facility] NE[facility]OSCIENCES 4A  Occupational Therapy  Daily Note    Discharge Recommendations: Continue to assess pending progress and Subacute/skilled nursing facility  Equipment Recommendations:   monitor pending progress    Time In: 1113  Time Out: 1158  Timed Code Treatment Minutes: 45 Minutes  Minutes: 45        Date: 2024  Patient Name: Priya Lema,   Gender: female      Room: [address] [place] [address]  MRN: [id number]  : 1965  (59 y.o.)  Referring Practitioner: Shireen Murillo MD  Diagnosis: Pyelonephritis  Additional Pertinent Hx: Per H & P:59 y.o. female with a history of hypertension, hyperlipidemia, COPD, and chronic respiratory failure with hypoxia who was transferred from an outside facility for sepsis secondary to left-sided pyelonephritis with an associated left-sided nephrolithiasis.  The patient reports she presented to the outside facility initially for some left-sided back/flank pain.  She was not having any associated urinary symptoms at that time.  She does report some subjective fevers and chills.  She was found to have a left-sided nephrolithiasis and was transferred to [facility] for urology evaluation.  At this time, she does report some mild pain.  She is on oxygen at baseline.Rapid response  for decreased mentation, afib 's. Given 1L fld bolus. Transferred to ICU and intubated for airway protection. Taken to surgery today for cystoscopy with left ureteral stent. s/p CYSTOSCOPY LEFT URETERAL STENT INSERTION on . Unable to extubate post-op d/t tongue swelling and no cuff leak. Extubated  and transferred to stepdown unit    Restrictions/Precautions:  Restrictions/Precautions: General Precautions, Fall Risk  Position Activity Restriction  Other position/activity restrictions: monitor O2 & HR (2-4L O2 at baseline)     Social/Functional History:  Lives With: Other (comment)  Type of Home: House  Home Layout: Two level,  Bed/Bath upstairs, Able to Live on Main level with bedroom/bathroom (Shower is upstairs (~10 steps))  Home Access: Stairs to enter with rails  Entrance Stairs - Number of Steps: 2  Home Equipment: None   Bathroom Shower/Tub: Tub/Shower unit  Bathroom Toilet: Standard  Bathroom Equipment: None       ADL Assistance: Independent  Homemaking Assistance: Independent  Ambulation Assistance: Independent  Transfer Assistance: Independent    Active : Yes  Occupation: On disability  Additional Comments: Per CM note, Pt lives alone & was on 2-4L O2.    SUBJECTIVE: Pt supine in bed upon arrival. Pt pleasant and agreeable to OT tx.     PAIN: discomfort from lying in bed for extended period of time    Vitals: Vitals not assessed per clinical judgement, see nursing flowsheet    Pt on 2 Lt NC  COGNITION: Slow Processing, Decreased Insight, and Decreased Problem Solving    ADL:   Feeding: Independent.  While sitting up in chair  Grooming: with set-up.  To comb hair while sitting in chair  Lower Extremity Dressing: Maximum Assistance.  To thread BLEs into brief. Pt able to pull up over bottom in standing with increased time.   Toileting: Maximum Assistance.  Upon standing from EOB, pt noted to be incontinent of bowel. Pt completes toileting with Max A for hygiene with increased time required.   Toilet Transfer: Contact Guard Assistance.   .    IADL:   Not Tested    BALANCE:  Sitting Balance:  Independent. EOB  Standing Balance: Contact Guard Assistance. With BUE support    BED MOBILITY:  Supine to Sit: Supervision, with rail      TRANSFERS:  Sit to Stand:  Contact Guard Assistance.    Stand to Sit: Contact Guard Assistance.      FUNCTIONAL MOBILITY:  Assistive Device: Walker  Assist Level:  Minimal Assistance.   Distance: To and from bathroom  Pt repeatedly attempting to rest forearms on walker handles. Pt requires assist for safe walker mgmt.      Therapist retrieves 2WW at end of session and educates pt about using it for

## 2024-12-02 NOTE — RT PROTOCOL NOTE
RT Inhaler-Nebulizer Bronchodilator Protocol Note    There is a bronchodilator order in the chart from a provider indicating to follow the RT Bronchodilator Protocol and there is an “Initiate RT Inhaler-Nebulizer Bronchodilator Protocol” order as well (see protocol at bottom of note).    CXR Findings:  No results found.    The findings from the last RT Protocol Assessment were as follows:   History Pulmonary Disease: Chronic pulmonary disease  Respiratory Pattern: Dyspnea on exertion or RR 21-25 bpm  Breath Sounds: Slightly diminished and/or crackles  Cough: Strong, productive  Indication for Bronchodilator Therapy: Decreased or absent breath sounds, Wheezing associated with pulm disorder  Bronchodilator Assessment Score: 7    Aerosolized bronchodilator medication orders have been revised according to the RT Inhaler-Nebulizer Bronchodilator Protocol below.    Respiratory Therapist to perform RT Therapy Protocol Assessment initially then follow the protocol.  Repeat RT Therapy Protocol Assessment PRN for score 0-3 or on second treatment, BID, and PRN for scores above 3.    No Indications - adjust the frequency to every 6 hours PRN wheezing or bronchospasm, if no treatments needed after 48 hours then discontinue using Per Protocol order mode.     If indication present, adjust the RT bronchodilator orders based on the Bronchodilator Assessment Score as indicated below.  Use Inhaler orders unless patient has one or more of the following: on home nebulizer, not able to hold breath for 10 seconds, is not alert and oriented, cannot activate and use MDI correctly, or respiratory rate 25 breaths per minute or more, then use the equivalent nebulizer order(s) with same Frequency and PRN reasons based on the score.  If a patient is on this medication at home then do not decrease Frequency below that used at home.    0-3 - enter or revise RT bronchodilator order(s) to equivalent RT Bronchodilator order with Frequency of every 4  hours PRN for wheezing or increased work of breathing using Per Protocol order mode.        4-6 - enter or revise RT Bronchodilator order(s) to two equivalent RT bronchodilator orders with one order with BID Frequency and one order with Frequency of every 4 hours PRN wheezing or increased work of breathing using Per Protocol order mode.        7-10 - enter or revise RT Bronchodilator order(s) to two equivalent RT bronchodilator orders with one order with TID Frequency and one order with Frequency of every 4 hours PRN wheezing or increased work of breathing using Per Protocol order mode.       11-13 - enter or revise RT Bronchodilator order(s) to one equivalent RT bronchodilator order with QID Frequency and an Albuterol order with Frequency of every 4 hours PRN wheezing or increased work of breathing using Per Protocol order mode.      Greater than 13 - enter or revise RT Bronchodilator order(s) to one equivalent RT bronchodilator order with every 4 hours Frequency and an Albuterol order with Frequency of every 2 hours PRN wheezing or increased work of breathing using Per Protocol order mode.     RT to enter RT Home Evaluation for COPD & MDI Assessment order using Per Protocol order mode.    Electronically signed by Luis Dennis RCP on 12/2/2024 at 9:13 AM

## 2024-12-02 NOTE — PROGRESS NOTES
[facility]  INPATIENT PHYSICAL THERAPY  DAILY NOTE  [facility] 4A - 4A-04/004-A      Discharge Recommendations: Subacte/Skilled Nursing Facility and Patient would benefit from continued PT at discharge  Equipment Recommendations:    Pend pt progress          Time In: 1334  Time Out: 1401  Timed Code Treatment Minutes: 27 Minutes  Minutes: 27          Date: 2024  Patient Name: Priya Lema,  Gender:  female        MRN: [id number]  : 1965  (59 y.o.)     Referring Practitioner: Shireen Murillo MD  Diagnosis: Pyelonephritis  Additional Pertinent Hx: 59 y.o. female with a history of hypertension, hyperlipidemia, COPD, and chronic respiratory failure with hypoxia who was transferred from an outside facility for sepsis secondary to left-sided pyelonephritis with an associated left-sided nephrolithiasis.  The patient reports she presented to the outside facility initially for some left-sided back/flank pain.  She was not having any associated urinary symptoms at that time.  She does report some subjective fevers and chills.  She was found to have a left-sided nephrolithiasis and was transferred to [facility] for urology evaluation.  At this time, she does report some mild pain.  She is on oxygen at baseline.Rapid response  for decreased mentation, afib 's. Given 1L fld bolus. Transferred to ICU and intubated for airway protection. Taken to surgery today for cystoscopy with left ureteral stent. s/p CYSTOSCOPY LEFT URETERAL STENT INSERTION on . Unable to extubate post-op d/t tongue swelling and no cuff leak. Pt still intubated on eval . Extubated      Prior Level of Function:  Lives With: Other (comment)  Type of Home: House  Home Layout: Two level, Bed/Bath upstairs, Able to Live on Main level with bedroom/bathroom (Shower is upstairs (~10 steps))  Home Access: Stairs to enter with rails  Entrance Stairs - Number of Steps: 2  Home Equipment: None

## 2024-12-02 NOTE — PROGRESS NOTES
Cardiology Progress Note      Patient:  Priya Lema  YOB: 1965  MRN: [id number]   Acct: [id number]  Admit Date:  11/20/2024  Primary Cardiologist: mando Zapata   Seen by Dr. Haque     Per prior cardiology consult note-  REASON FOR CONSULT:   New drop in EF, ? Ischemic work up, elevated troponin, new atrial fibrillation       CHIEF COMPLAINT:    Flank pain     HISTORY OF PRESENT ILLNESS:    Priya Lema is a 59 year old female patient with past medical history that includes hypertension, COPD, tobacco abuse, obesity. She has h/o chronic respiratory failure and I s on home oxygen. Her family history is positive for CHF in her mother. Patient is intubated and is unable to provide history. Her family at bedside states that she was referred to a cardiologist last year for swelling in her legs. Echocardiogram on 12/2023 revealed an EF of 50-55%.The patient was admitted to the hospital on 11/20/2024. She had ;eft flank pain and was found to have RAINE. Patient developed worsening hypoxia and is now s/p intubation, being treated for COPD exacerbation. The patient is also being treated for UTI, Pyelonephritis, kidney stones and sepsis. Gram negative bacillin were noted on urine culture. She is followed by Urology and is now s/p left ureteral stent on 11/21/2024. She was noted to have new drop in EF on Echocardiogram with new onset atrial fibrillation and elevated troponin, cardiology consult was requested for further management and possible ischemic evaluation. Echocardiogram on 11/21/2024 revealed an EF of 30-35%.        Subjective (Events in last 24 hours):   Pt in bed - droggy    She can nod head yes and no to answer questions     Noted hematuria in [place] bag - cherry red     Pt nods head no to chest pain or SOB - barely can talk at present     Tele SR no ectopy   BP stable       12/2/24  Pt awake in bed   Answers questions appropriately     No known CAD - use 2L NC at home   No chest pains  range. There was no     significant regurgitation.     Tricuspid valve:     - The valve was structurally normal. The leaflets were normal     thickness.     Doppler:     - Transvalvular velocity was within the normal range. There was no     evidence for stenosis. There was trivial to mild regurgitation.     Pulmonary artery:     - Systolic pressure was at the upper limits of normal.     Right atrium:     - Not well visualized. The atrium was normal in size.     Pericardium:     - There was no pericardial effusion.     Systemic veins:     - Not visualized.         Stress:     Left Heart Cath:     Lab Data:    Cardiac Enzymes:  No results for input(s): \"CKTOTAL\", \"CKMB\", \"CKMBINDEX\", \"TROPONINI\" in the last 72 hours.    CBC:   Lab Results   Component Value Date/Time    WBC 11.0 12/02/2024 04:15 AM    RBC 2.82 12/02/2024 04:15 AM    HGB 7.1 12/02/2024 04:15 AM    HCT 22.9 12/02/2024 04:15 AM     12/02/2024 04:15 AM       CMP:    Lab Results   Component Value Date/Time     12/02/2024 04:15 AM    K 3.5 12/02/2024 04:15 AM     12/02/2024 04:15 AM    CO2 31 12/02/2024 04:15 AM    BUN 22 12/02/2024 04:15 AM    CREATININE 0.9 12/02/2024 04:15 AM    LABGLOM 73 12/02/2024 04:15 AM    GLUCOSE 107 12/02/2024 04:15 AM    CALCIUM 7.4 12/02/2024 04:15 AM       Hepatic Function Panel:    Lab Results   Component Value Date/Time    ALKPHOS 61 11/30/2024 03:30 AM    ALT 36 11/30/2024 03:30 AM    AST 20 11/30/2024 03:30 AM    BILITOT 0.7 11/30/2024 03:30 AM    BILIDIR 0.3 11/20/2024 05:51 PM       Magnesium:    Lab Results   Component Value Date/Time    MG 1.4 12/02/2024 04:15 AM       PT/INR:    Lab Results   Component Value Date/Time    INR 1.22 11/22/2024 08:15 AM       HgBA1c:    Lab Results   Component Value Date/Time    LABA1C 7.9 11/23/2024 04:30 AM       FLP:    Lab Results   Component Value Date/Time    TRIG 236 11/28/2024 03:39 AM    HDL 31 11/28/2024 03:39 AM       TSH:    Lab Results   Component Value

## 2024-12-02 NOTE — PROGRESS NOTES
Hospitalist Progress Note      [facility]    Unit[facility]-04/004-A  YOB: 1965  MRN: [id number]   Acct: [id number]   PCP: Parmjit Tesfaye MD  Date of Admission: 11/20/2024    Assessment/Plan:    Sepsis secondary to left nephrolithiasis complicated by left pyelonephritis  Septic shock - resolved  CT scan at outside facility showed nephrolithiasis on the left side, and cystic characteristic on the right kidney.  Leukocytosis worsening, 29.3 from 19.5, secondary to steroids. Urine culture positive for E.coli. (11/20). Bclx NGTD. Respiratory panel negative (11/25). UA(11/25) reported no caast, WBC>100, RBC>200. Admitted to the ICU on pressors. Has been successfully weaned off. On cefepime.   Clinically improving, hemodynamically stable and afebrile.  Remains off pressors.  Leukocytosis continues to downtrend, currently 11  Previous infectious workup with urine culture from 11/20 growing pansensitive E coli.   Patient previously on cefepime, switched to CTX and flagyl, plan for 7 days of therapy (suspected aspiration pneumonia)  EOT 12/4/24  Hb down to 7.1. continues to have blood tinged urology.  Case discussed with urology. Cleared for DAPT. Need to monitor Hb and urine output closely  Hematuria likely expected in this case.   Needs definitive stone mgmt on outpatient basis  Plan for repeat H&H and type and screen at 1200. No evidence of melenic stools, hematochezia, hemoptysis    Acute on chronic hypoxic hypercapnic respiratory failure in setting of aspiration pneumonia  On 2 L oxygen at home.  Intubated on 11/20 for airway protection. CXR 11/26 shows improvement with right opacity and infiltrates. Mild improvement with left pleural effusion.   Respiratory status improving, weaned back to baseline 2L 11/30/2024  WBC downtrending  As above, treat for aspiration pneumonia  Continue ceftriaxone, flagyl, end date    This document has been electronically signed by: Eitan Jacobs MD on    11/28/2024 03:35 AM      XR CHEST PORTABLE   Final Result   1. Small left-sided pleural effusion.   2. Bilateral lower lung atelectasis/infiltrate.               **This report has been created using voice recognition software. It may contain   minor errors which are inherent in voice recognition technology.**            Electronically signed by Dr. Jamir Stubbs      XR CHEST PORTABLE   Final Result      Mild interstitial pulmonary opacities bilaterally. The differential    diagnosis includes mild pulmonary vascular congestion and pneumonia.      Cardiomegaly.      Interval removal of endotracheal tube and nasogastric tube.      This document has been electronically signed by: Eitan Jacobs MD on    11/27/2024 06:37 AM      CT HEAD WO CONTRAST   Final Result   1. Limited exam. No acute disease.   2. Left sphenoid sinus disease is noted.   3. Opacification of the mastoid air cells and middle ear cavities, without    aggressive bony destructive change or acute fracture. This can be    correlated with patient history/symptoms.      This document has been electronically signed by: Jim Mcpherson M.D. on    11/26/2024 11:21 PM      All CTs at this facility use dose modulation techniques and iterative    reconstructions, and/or weight-based dosing   when appropriate to reduce radiation to a low as reasonably achievable.      XR CHEST PORTABLE   Final Result   1. Improved aeration involving the bilateral lung bases with some residual    opacities of the medial right lung base and within the retrocardiac region.      This document has been electronically signed by: Vincent Miller DO on    11/26/2024 02:14 AM      XR CHEST PORTABLE   Final Result   Persistent under aeration versus infiltrate in the left lung base   and medial right lung base.               **This report has been created using voice recognition software. It may contain   minor errors which

## 2024-12-02 NOTE — CARE COORDINATION
12/2/24, 1:48 PM EST    DISCHARGE ON GOING EVALUATION    Priya MAICOL [facility] day: 12  Location: -04/004-A Reason for admit: Pyelonephritis [N12]  Sepsis (HCC) [A41.9]     Procedures:   11/20 CVC RIJ - placed at [facility]  11/20 Intubated  11/21 Cystoscopy with left ureteral stent insertion  11/21 ECHO: EF 30-35%; mod global hypokinesis  11/27 PICC line placement  11/29 MBS-passed    Imaging since last note: none    Barriers to Discharge: PT/OT/SLP, diabetes management, Cardiology following, planning  heart cath. Urology has signed off. Nebs, IV Bumex, IV Rocephin, Heparin drip, Flagyl, electrolyte replacement protocols.    PCP: Parmjit Tesfaye MD  Readmission Risk Score: 17.3    Patient Goals/Plan/Treatment Preferences: Plan for SNF. SW following.

## 2024-12-02 NOTE — PLAN OF CARE
Problem: Discharge Planning  Goal: Discharge to home or other facility with appropriate resources  Outcome: Progressing  Flowsheets (Taken 12/1/2024 0837 by Ban Ellsworth RN)  Discharge to home or other facility with appropriate resources: Identify barriers to discharge with patient and caregiver     Problem: Pain  Goal: Verbalizes/displays adequate comfort level or baseline comfort level  Outcome: Progressing  Flowsheets (Taken 11/28/2024 0844 by Kenisha Stubbs, RN)  Verbalizes/displays adequate comfort level or baseline comfort level:   Encourage patient to monitor pain and request assistance   Assess pain using appropriate pain scale   Administer analgesics based on type and severity of pain and evaluate response     Problem: Neurosensory - Adult  Goal: Achieves stable or improved neurological status  Outcome: Progressing  Flowsheets (Taken 12/1/2024 0837 by Bna Ellsworth RN)  Achieves stable or improved neurological status: Maintain blood pressure and fluid volume within ordered parameters to optimize cerebral perfusion and minimize risk of hemorrhage  Goal: Achieves maximal functionality and self care  Outcome: Progressing  Flowsheets (Taken 12/1/2024 0837 by Ban Ellsworth RN)  Achieves maximal functionality and self care: Monitor swallowing and airway patency with patient fatigue and changes in neurological status     Problem: Respiratory - Adult  Goal: Achieves optimal ventilation and oxygenation  Outcome: Progressing  Flowsheets (Taken 12/2/2024 0911 by Luis Dennis RCP)  Achieves optimal ventilation and oxygenation:   Assess for changes in respiratory status   Respiratory therapy support as indicated  Goal: Clear lung sounds  Outcome: Progressing     Problem: Cardiovascular - Adult  Goal: Maintains optimal cardiac output and hemodynamic stability  Outcome: Progressing  Flowsheets (Taken 12/1/2024 0837 by Ban Ellsworth RN)  Maintains optimal cardiac output and hemodynamic stability: Assess for signs  prescribed range  Outcome: Progressing  Flowsheets (Taken 12/1/2024 0837 by Ban Ellsworth, RN)  Glucose maintained within prescribed range: Monitor blood glucose as ordered     Problem: Hematologic - Adult  Goal: Maintains hematologic stability  Outcome: Progressing  Flowsheets (Taken 12/1/2024 0837 by Ban Ellsworth, RN)  Maintains hematologic stability: Administer blood products/factors as ordered     Problem: Skin/Tissue Integrity  Goal: Absence of new skin breakdown  Description: 1.  Monitor for areas of redness and/or skin breakdown  2.  Assess vascular access sites hourly  3.  Every 4-6 hours minimum:  Change oxygen saturation probe site  4.  Every 4-6 hours:  If on nasal continuous positive airway pressure, respiratory therapy assess nares and determine need for appliance change or resting period.  Outcome: Progressing     Problem: Safety - Adult  Goal: Free from fall injury  Outcome: Progressing  Flowsheets (Taken 11/27/2024 0624 by Petra Urrutia, RN)  Free From Fall Injury: Instruct family/caregiver on patient safety     Problem: Safety - Medical Restraint  Goal: Remains free of injury from restraints (Restraint for Interference with Medical Device)  Description: INTERVENTIONS:  1. Determine that other, less restrictive measures have been tried or would not be effective before applying the restraint  2. Evaluate the patient's condition at the time of restraint application  3. Inform patient/family regarding the reason for restraint  4. Q2H: Monitor safety, psychosocial status, comfort, nutrition and hydration  Outcome: Progressing  Flowsheets (Taken 11/25/2024 2000 by Andie Bryan, RN)  Remains free of injury from restraints (restraint for interference with medical device):   Determine that other, less restrictive measures have been tried or would not be effective before applying the restraint   Evaluate the patient's condition at the time of restraint application   Inform patient/family regarding the

## 2024-12-03 PROBLEM — I10 ESSENTIAL HYPERTENSION: Status: ACTIVE | Noted: 2024-12-03

## 2024-12-03 PROBLEM — E11.21 DIABETIC NEPHROPATHY WITH PROTEINURIA (HCC): Status: ACTIVE | Noted: 2024-12-03

## 2024-12-03 PROBLEM — Z01.818 PREOPERATIVE CLEARANCE: Status: ACTIVE | Noted: 2024-12-03

## 2024-12-03 LAB
ANION GAP SERPL CALC-SCNC: 12 MEQ/L (ref 8–16)
ANION GAP SERPL CALC-SCNC: 7 MEQ/L (ref 8–16)
BUN SERPL-MCNC: 17 MG/DL (ref 7–22)
BUN SERPL-MCNC: 18 MG/DL (ref 7–22)
CALCIUM SERPL-MCNC: 7.4 MG/DL (ref 8.5–10.5)
CALCIUM SERPL-MCNC: 7.5 MG/DL (ref 8.5–10.5)
CHLORIDE SERPL-SCNC: 100 MEQ/L (ref 98–111)
CHLORIDE SERPL-SCNC: 103 MEQ/L (ref 98–111)
CO2 SERPL-SCNC: 31 MEQ/L (ref 23–33)
CO2 SERPL-SCNC: 33 MEQ/L (ref 23–33)
CREAT SERPL-MCNC: 1 MG/DL (ref 0.4–1.2)
CREAT SERPL-MCNC: 1.1 MG/DL (ref 0.4–1.2)
DEPRECATED RDW RBC AUTO: 51.8 FL (ref 35–45)
ERYTHROCYTE [DISTWIDTH] IN BLOOD BY AUTOMATED COUNT: 19.1 % (ref 11.5–14.5)
GFR SERPL CREATININE-BSD FRML MDRD: 57 ML/MIN/1.73M2
GFR SERPL CREATININE-BSD FRML MDRD: 64 ML/MIN/1.73M2
GLUCOSE BLD STRIP.AUTO-MCNC: 116 MG/DL (ref 70–108)
GLUCOSE BLD STRIP.AUTO-MCNC: 151 MG/DL (ref 70–108)
GLUCOSE BLD STRIP.AUTO-MCNC: 210 MG/DL (ref 70–108)
GLUCOSE BLD STRIP.AUTO-MCNC: 275 MG/DL (ref 70–108)
GLUCOSE SERPL-MCNC: 115 MG/DL (ref 70–108)
GLUCOSE SERPL-MCNC: 129 MG/DL (ref 70–108)
HCT VFR BLD AUTO: 23 % (ref 37–47)
HCT VFR BLD AUTO: 24.5 % (ref 37–47)
HEPARIN UNFRACTIONATED: 0.15 U/ML (ref 0.3–0.7)
HEPARIN UNFRACTIONATED: 0.17 U/ML (ref 0.3–0.7)
HEPARIN UNFRACTIONATED: 0.3 U/ML (ref 0.3–0.7)
HGB BLD-MCNC: 7.2 GM/DL (ref 12–16)
HGB BLD-MCNC: 7.7 GM/DL (ref 12–16)
MAGNESIUM SERPL-MCNC: 2 MG/DL (ref 1.6–2.4)
MCH RBC QN AUTO: 25.6 PG (ref 26–33)
MCHC RBC AUTO-ENTMCNC: 31.3 GM/DL (ref 32.2–35.5)
MCV RBC AUTO: 81.9 FL (ref 81–99)
PLATELET # BLD AUTO: 171 THOU/MM3 (ref 130–400)
PMV BLD AUTO: 11.1 FL (ref 9.4–12.4)
POTASSIUM SERPL-SCNC: 3.4 MEQ/L (ref 3.5–5.2)
POTASSIUM SERPL-SCNC: 3.8 MEQ/L (ref 3.5–5.2)
RBC # BLD AUTO: 2.81 MILL/MM3 (ref 4.2–5.4)
SODIUM SERPL-SCNC: 140 MEQ/L (ref 135–145)
SODIUM SERPL-SCNC: 146 MEQ/L (ref 135–145)
WBC # BLD AUTO: 9.9 THOU/MM3 (ref 4.8–10.8)

## 2024-12-03 PROCEDURE — 85520 HEPARIN ASSAY: CPT

## 2024-12-03 PROCEDURE — 6370000000 HC RX 637 (ALT 250 FOR IP): Performed by: NURSE PRACTITIONER

## 2024-12-03 PROCEDURE — 99232 SBSQ HOSP IP/OBS MODERATE 35: CPT | Performed by: INTERNAL MEDICINE

## 2024-12-03 PROCEDURE — 99222 1ST HOSP IP/OBS MODERATE 55: CPT | Performed by: INTERNAL MEDICINE

## 2024-12-03 PROCEDURE — 94761 N-INVAS EAR/PLS OXIMETRY MLT: CPT

## 2024-12-03 PROCEDURE — 6360000002 HC RX W HCPCS

## 2024-12-03 PROCEDURE — 97530 THERAPEUTIC ACTIVITIES: CPT

## 2024-12-03 PROCEDURE — 6370000000 HC RX 637 (ALT 250 FOR IP): Performed by: PHYSICIAN ASSISTANT

## 2024-12-03 PROCEDURE — 36592 COLLECT BLOOD FROM PICC: CPT

## 2024-12-03 PROCEDURE — 82948 REAGENT STRIP/BLOOD GLUCOSE: CPT

## 2024-12-03 PROCEDURE — 94669 MECHANICAL CHEST WALL OSCILL: CPT

## 2024-12-03 PROCEDURE — 85027 COMPLETE CBC AUTOMATED: CPT

## 2024-12-03 PROCEDURE — 97110 THERAPEUTIC EXERCISES: CPT

## 2024-12-03 PROCEDURE — 6360000002 HC RX W HCPCS: Performed by: INTERNAL MEDICINE

## 2024-12-03 PROCEDURE — 1200000003 HC TELEMETRY R&B

## 2024-12-03 PROCEDURE — 85014 HEMATOCRIT: CPT

## 2024-12-03 PROCEDURE — 36415 COLL VENOUS BLD VENIPUNCTURE: CPT

## 2024-12-03 PROCEDURE — 6370000000 HC RX 637 (ALT 250 FOR IP)

## 2024-12-03 PROCEDURE — 2580000003 HC RX 258: Performed by: INTERNAL MEDICINE

## 2024-12-03 PROCEDURE — 2700000000 HC OXYGEN THERAPY PER DAY

## 2024-12-03 PROCEDURE — 97535 SELF CARE MNGMENT TRAINING: CPT

## 2024-12-03 PROCEDURE — 80048 BASIC METABOLIC PNL TOTAL CA: CPT

## 2024-12-03 PROCEDURE — 83735 ASSAY OF MAGNESIUM: CPT

## 2024-12-03 PROCEDURE — 94640 AIRWAY INHALATION TREATMENT: CPT

## 2024-12-03 PROCEDURE — 85018 HEMOGLOBIN: CPT

## 2024-12-03 PROCEDURE — 6360000002 HC RX W HCPCS: Performed by: UROLOGY

## 2024-12-03 PROCEDURE — 97116 GAIT TRAINING THERAPY: CPT

## 2024-12-03 PROCEDURE — 6370000000 HC RX 637 (ALT 250 FOR IP): Performed by: INTERNAL MEDICINE

## 2024-12-03 PROCEDURE — 2580000003 HC RX 258: Performed by: PHYSICIAN ASSISTANT

## 2024-12-03 RX ORDER — ALBUTEROL SULFATE 0.83 MG/ML
2.5 SOLUTION RESPIRATORY (INHALATION)
Status: DISCONTINUED | OUTPATIENT
Start: 2024-12-03 | End: 2024-12-06 | Stop reason: HOSPADM

## 2024-12-03 RX ORDER — BUMETANIDE 1 MG/1
1 TABLET ORAL 2 TIMES DAILY
Status: DISCONTINUED | OUTPATIENT
Start: 2024-12-04 | End: 2024-12-06 | Stop reason: HOSPADM

## 2024-12-03 RX ORDER — METRONIDAZOLE 500 MG/1
500 TABLET ORAL EVERY 8 HOURS SCHEDULED
Status: COMPLETED | OUTPATIENT
Start: 2024-12-03 | End: 2024-12-04

## 2024-12-03 RX ORDER — DOCUSATE SODIUM 100 MG/1
100 CAPSULE, LIQUID FILLED ORAL DAILY
Status: DISCONTINUED | OUTPATIENT
Start: 2024-12-03 | End: 2024-12-06 | Stop reason: HOSPADM

## 2024-12-03 RX ADMIN — SPIRONOLACTONE 25 MG: 25 TABLET ORAL at 08:49

## 2024-12-03 RX ADMIN — WATER 1000 MG: 1 INJECTION INTRAMUSCULAR; INTRAVENOUS; SUBCUTANEOUS at 08:47

## 2024-12-03 RX ADMIN — ASPIRIN 81 MG: 81 TABLET, CHEWABLE ORAL at 08:33

## 2024-12-03 RX ADMIN — TIOTROPIUM BROMIDE AND OLODATEROL 2 PUFF: 3.124; 2.736 SPRAY, METERED RESPIRATORY (INHALATION) at 07:44

## 2024-12-03 RX ADMIN — ATORVASTATIN CALCIUM 40 MG: 40 TABLET, FILM COATED ORAL at 20:08

## 2024-12-03 RX ADMIN — INSULIN LISPRO 4 UNITS: 100 INJECTION, SOLUTION INTRAVENOUS; SUBCUTANEOUS at 12:48

## 2024-12-03 RX ADMIN — METOPROLOL SUCCINATE 25 MG: 25 TABLET, FILM COATED, EXTENDED RELEASE ORAL at 08:48

## 2024-12-03 RX ADMIN — ALBUTEROL SULFATE 2.5 MG: 2.5 SOLUTION RESPIRATORY (INHALATION) at 07:44

## 2024-12-03 RX ADMIN — INSULIN GLARGINE 25 UNITS: 100 INJECTION, SOLUTION SUBCUTANEOUS at 08:33

## 2024-12-03 RX ADMIN — METRONIDAZOLE 500 MG: 500 TABLET ORAL at 16:30

## 2024-12-03 RX ADMIN — ACETAMINOPHEN 650 MG: 325 TABLET ORAL at 06:50

## 2024-12-03 RX ADMIN — POLYETHYLENE GLYCOL 3350 17 G: 17 POWDER, FOR SOLUTION ORAL at 08:48

## 2024-12-03 RX ADMIN — SODIUM CHLORIDE, PRESERVATIVE FREE 10 ML: 5 INJECTION INTRAVENOUS at 08:48

## 2024-12-03 RX ADMIN — INSULIN LISPRO 2 UNITS: 100 INJECTION, SOLUTION INTRAVENOUS; SUBCUTANEOUS at 20:08

## 2024-12-03 RX ADMIN — METRONIDAZOLE 500 MG: 500 TABLET ORAL at 22:47

## 2024-12-03 RX ADMIN — DOCUSATE SODIUM 100 MG: 100 CAPSULE, LIQUID FILLED ORAL at 08:47

## 2024-12-03 RX ADMIN — HEPARIN SODIUM 17 UNITS/KG/HR: 10000 INJECTION, SOLUTION INTRAVENOUS at 12:54

## 2024-12-03 RX ADMIN — HEPARIN SODIUM 2000 UNITS: 1000 INJECTION INTRAVENOUS; SUBCUTANEOUS at 12:52

## 2024-12-03 RX ADMIN — BUDESONIDE 250 MCG: 0.25 INHALANT RESPIRATORY (INHALATION) at 20:55

## 2024-12-03 RX ADMIN — BUDESONIDE 250 MCG: 0.25 INHALANT RESPIRATORY (INHALATION) at 07:44

## 2024-12-03 RX ADMIN — ALBUTEROL SULFATE 2.5 MG: 2.5 SOLUTION RESPIRATORY (INHALATION) at 20:55

## 2024-12-03 RX ADMIN — HEPARIN SODIUM 17 UNITS/KG/HR: 10000 INJECTION, SOLUTION INTRAVENOUS at 20:57

## 2024-12-03 RX ADMIN — METRONIDAZOLE 500 MG: 500 TABLET ORAL at 06:41

## 2024-12-03 RX ADMIN — SODIUM CHLORIDE, PRESERVATIVE FREE 10 ML: 5 INJECTION INTRAVENOUS at 20:08

## 2024-12-03 ASSESSMENT — ENCOUNTER SYMPTOMS
SHORTNESS OF BREATH: 0
NAUSEA: 0
CHEST TIGHTNESS: 0
VOMITING: 0

## 2024-12-03 ASSESSMENT — PAIN DESCRIPTION - LOCATION: LOCATION: BACK

## 2024-12-03 ASSESSMENT — PAIN SCALES - GENERAL: PAINLEVEL_OUTOF10: 6

## 2024-12-03 ASSESSMENT — PAIN DESCRIPTION - DESCRIPTORS: DESCRIPTORS: ACHING

## 2024-12-03 NOTE — PLAN OF CARE
Problem: Discharge Planning  Goal: Discharge to home or other facility with appropriate resources  Outcome: Progressing  Flowsheets (Taken 12/3/2024 0735)  Discharge to home or other facility with appropriate resources:   Identify discharge learning needs (meds, wound care, etc)   Identify barriers to discharge with patient and caregiver   Refer to discharge planning if patient needs post-hospital services based on physician order or complex needs related to functional status, cognitive ability or social support system     Problem: Pain  Goal: Verbalizes/displays adequate comfort level or baseline comfort level  Outcome: Progressing  Flowsheets (Taken 12/3/2024 0735)  Verbalizes/displays adequate comfort level or baseline comfort level:   Consider cultural and social influences on pain and pain management   Administer analgesics based on type and severity of pain and evaluate response   Encourage patient to monitor pain and request assistance     Problem: Neurosensory - Adult  Goal: Achieves stable or improved neurological status  Outcome: Progressing  Flowsheets (Taken 12/3/2024 0735)  Achieves stable or improved neurological status:   Monitor temperature, glucose, and sodium. Initiate appropriate interventions as ordered   Initiate measures to prevent increased intracranial pressure   Assess for and report changes in neurological status   Maintain blood pressure and fluid volume within ordered parameters to optimize cerebral perfusion and minimize risk of hemorrhage  Goal: Achieves maximal functionality and self care  Outcome: Progressing     Problem: Neurosensory - Adult  Goal: Achieves maximal functionality and self care  Outcome: Progressing

## 2024-12-03 NOTE — CONSULTS
[facility]          [address]        [address]        [place], [place] [id number] [phone]           Inpatient Initial consult note         12/3/2024 12:14 PM      Patient Name:   Priya Lema  YOB: 1965  Primary Care Physician:  Parmjit Tesfaye MD   Admit Date:    11/20/2024  4:47 PM    Consultation requested by : Nessa Loomis NP    Reason for Consult : Nephrology following for clearance for cardiac catheterization, concern for CKD    History of presenting illness : Priya Lema is a 59 y.o.  female with Past Medical History as stated below presented with a chief complaint of flank pain on 11/20/2024 .  She was [facility] to [facility] for pansensitive E. coli sepsis with left-sided pyelonephritis from nephrolithiasis.  CTAP showed nephrolithiasis left side.  11/21 US renal shows normal cortical thickness, right renal cysts, left hydronephrosis/nephrolithiasis.  Started on empiric ceftriaxone.  On admission patient also found to have severe RAINE creatinine 2.9, no baseline on file, hematuria also present.  Urology consulted.  Left ureteral stent placed 11/21.  Shortly after admission 11/20, patient developed A-fib RVR, decreased mentation, acute hypoxic respiratory failure refractory to NIPPV, fluid boluses so patient was intubated and transferred to ICU.  At that point patient was found to be in septic shock, pressor support, Solu-Cortef started eventually weaned off. Extubated 11/26.  11/22 Cefipime initiated due to worsening clinical condition.  Required NG tube from 11/27 - 11/29 due to dysphagia.  Several CXRs taken during hospital course demonstrating lower lung base infiltrates, suggestive of aspiration pneumonia given patient's recent AMS, dysphagia.  11/28 cefepime switched to ceftriaxone, metronidazole for suspected aspiration pneumonia.  Cardiology consulted, TTE 11/21 EF 30-35% moderate global hypokinesis.  Worse compared to previous TTE  12/2023 50-55% EF, LV diastolic dysfunction.  Currently pending cardiac cath.  Consulted nephrology reason as above.        Past History:  Past Medical History:   Diagnosis Date    COPD (chronic obstructive pulmonary disease) (HCC)     Diabetes mellitus (HCC)     Hyperlipidemia     Hypertension     CARROLL (obstructive sleep apnea)      Past Surgical History:   Procedure Laterality Date    CYSTOSCOPY Left 11/21/2024    CYSTOSCOPY LEFT URETERAL STENT INSERTION performed by Zurdo Richardson MD at [facility] OR     Social History     Socioeconomic History    Marital status:      Spouse name: Not on file    Number of children: Not on file    Years of education: Not on file    Highest education level: Not on file   Occupational History    Not on file   Tobacco Use    Smoking status: Not on file    Smokeless tobacco: Not on file   Substance and Sexual Activity    Alcohol use: Not on file    Drug use: Not on file    Sexual activity: Not on file   Other Topics Concern    Not on file   Social History Narrative    Not on file     Social Determinants of Health     Financial Resource Strain: Not on file   Food Insecurity: Not on file   Transportation Needs: Not on file   Physical Activity: Not on file   Stress: Not on file   Social Connections: Not on file   Intimate Partner Violence: Not on file   Housing Stability: Not on file     No family history on file.    Medications & Allergies :  Prior to Admission medications    Not on File     Allergies: Patient has no known allergies.  IP meds : Scheduled Meds:   albuterol  2.5 mg Nebulization BID RT    [START ON 12/4/2024] bumetanide  1 mg Oral BID    docusate sodium  100 mg Oral Daily    metoprolol succinate  25 mg Oral Daily    [Held by provider] spironolactone  25 mg Oral Daily    insulin glargine  25 Units SubCUTAneous Daily    insulin lispro  0-8 Units SubCUTAneous 4x Daily AC & HS    cefTRIAXone (ROCEPHIN) IV  1,000 mg IntraVENous Q24H    metroNIDAZOLE  500 mg Per G Tube 3

## 2024-12-03 NOTE — CARE COORDINATION
12/3/24, 9:16 AM EST    DISCHARGE PLANNING EVALUATION    SW met with pt, discussed ECF for rehab.    Post-acute (PAC) provider list was provided to patient. Patient was informed of their freedom to choose PAC provider. Discussed and offered to show the patient the relevant PAC Providers quality and resource use measures on Medicare Compare web site via computer based on patient's goals of care and treatment preferences. Questions regarding selection process were answered.    Pt requested referral to: [facility], [facility] and [facility].    SW contacted all 3 above facilities, left message with their admissions coordinator to call SW regarding referral.      Await call back.     11:23 AM update:  SW received call back from [facility] with [facility], they do NOT have any MEDICAID beds at this time.    2:25 PM update:  GUERA spoke with Nora with [facility], they do have medicaid beds and can accept pt.    Referral faxed, await decision.

## 2024-12-03 NOTE — PROGRESS NOTES
Comprehensive Nutrition Assessment    Type and Reason for Visit:  Reassess    Nutrition Recommendations/Plan:   Diet as per Speech Therapy/ Physician   ONS: Glucerna once daily   Encouraged good nutrition/ healthy food choices - will provide additional carbohydrate control, heart healthy diet education as appropriate      Malnutrition Assessment:  Malnutrition Status:  At risk for malnutrition (11/26/24 1504)    Context:  Acute Illness     Findings of the 6 clinical characteristics of malnutrition:  Energy Intake:  Mild decrease in energy intake (received tube feed while intubated, currently NPO pending swallow evaluation when appropriate; family reports patient with good appetite/ intake PTA)  Weight Loss:   (weight appears stable per office visit weights in EMR)     Body Fat Loss:  No body fat loss     Muscle Mass Loss:  No muscle mass loss    Fluid Accumulation:  Mild Generalized   Strength:  Not Performed    Nutrition Assessment:     Pt. Improving from nutritional standpoint AEB no longer receives tube feed via NG tube, diet initiation per Speech Therapy 11/29, good intake per meal graphics, reports fair appetite. At risk for further nutrition compromise r/t sepsis 2/2 pyelonephritis, s/p cystoscopy - L ureteral stent insertion (11/21), pneumonia, RAINE, heart failure, intubation 11/20-11/26, and underlying medical condition (PMHx: HTN, COPD, T2DM with A1C (11/23/24) 7.9%, CARROLL, COPD, chronic tobacco abuse).     Nutrition Related Findings:    Pt. Report/Treatments/Miscellaneous: Patient seen and reports fair appetite, meal intake varies depending on what foods are served and how patient is feeling, typically had good appetite PTA and admits did not always make healthy food choices  GI Status: BM today   Pertinent Labs: glucose 115  Pertinent Meds: bumex, colace, lantus, humalog, flagyl, glycolax, senokot, aldactone      Wound Type: None       Current Nutrition Intake & Therapies:    Average Meal Intake: %

## 2024-12-03 NOTE — PROGRESS NOTES
Cardiology Progress Note      Patient:  Priya Lema  YOB: 1965  MRN: [id number]   Acct: [id number]  Admit Date:  11/20/2024  Primary Cardiologist: mando Zapata   Seen by Dr. Haque     Per prior cardiology consult note-  REASON FOR CONSULT:   New drop in EF, ? Ischemic work up, elevated troponin, new atrial fibrillation       CHIEF COMPLAINT:    Flank pain     HISTORY OF PRESENT ILLNESS:    Priya Lema is a 59 year old female patient with past medical history that includes hypertension, COPD, tobacco abuse, obesity. She has h/o chronic respiratory failure and I s on home oxygen. Her family history is positive for CHF in her mother. Patient is intubated and is unable to provide history. Her family at bedside states that she was referred to a cardiologist last year for swelling in her legs. Echocardiogram on 12/2023 revealed an EF of 50-55%.The patient was admitted to the hospital on 11/20/2024. She had ;eft flank pain and was found to have RAINE. Patient developed worsening hypoxia and is now s/p intubation, being treated for COPD exacerbation. The patient is also being treated for UTI, Pyelonephritis, kidney stones and sepsis. Gram negative bacillin were noted on urine culture. She is followed by Urology and is now s/p left ureteral stent on 11/21/2024. She was noted to have new drop in EF on Echocardiogram with new onset atrial fibrillation and elevated troponin, cardiology consult was requested for further management and possible ischemic evaluation. Echocardiogram on 11/21/2024 revealed an EF of 30-35%.        Subjective (Events in last 24 hours):   Pt in bed - droggy    She can nod head yes and no to answer questions     Noted hematuria in [place] bag - cherry red     Pt nods head no to chest pain or SOB - barely can talk at present     Tele SR no ectopy   BP stable       12/2/24  Pt awake in bed   Answers questions appropriately     No known CAD - use 2L NC at home   No chest pains

## 2024-12-03 NOTE — PROGRESS NOTES
Hospitalist Progress Note      Patient:  Priya Trejo    Unit/Bed:6K-25/025-A  YOB: 1965  MRN: [id number]   Acct: [id number]   PCP: Parjmit Tesfaye MD  Date of Admission: 11/20/2024    Assessment/Plan:    Sepsis secondary to left nephrolithiasis complicated by left pyelonephritis  Septic shock - resolved  hematuria  CT scan at outside facility showed nephrolithiasis on the left side, and cystic characteristic on the right kidney.  Leukocytosis worsening, 29.3 from 19.5, secondary to steroids. Urine culture positive for E.coli. (11/20). Bclx NGTD. Respiratory panel negative (11/25). UA(11/25) reported no caast, WBC>100, RBC>200. Admitted to the ICU on pressors. Has been successfully weaned off. On cefepime.   Clinically improving, hemodynamically stable and afebrile.  Remains off pressors.  Leukocytosis has cleared   Previous infectious workup with urine culture from 11/20 growing pansensitive E coli.   Patient previously on cefepime, switched to CTX and flagyl, plan for 7 days of therapy (suspected aspiration pneumonia)  EOT 12/4/24  Hb down to 7.1. continues to have blood tinged urine in [place].  Case discussed with urology. Cleared for DAPT. Need to monitor Hb and urine output closely  Repeat Hb at 7.7  Hematuria likely expected in this case.   Needs definitive stone mgmt on outpatient basis    Acute on chronic hypoxic hypercapnic respiratory failure in setting of aspiration pneumonia  On 2 L oxygen at home.  Intubated on 11/20 for airway protection. CXR 11/26 shows improvement with right opacity and infiltrates. Mild improvement with left pleural effusion.   Respiratory status improving, weaned back to baseline 2L 11/30/2024  WBC 9.9  Continue ceftriaxone, flagyl, end date 12/4/24  ETT culture with light growth of yeast, likely candida. Likely colonization and would not treat in this situation based on clinical

## 2024-12-03 NOTE — PROGRESS NOTES
[facility]  STRZ RENAL TELEMETRY 6K  Occupational Therapy  Daily Note    Discharge Recommendations: Subacute/skilled nursing facility  Equipment Recommendations:   monitor pending progress      Time In: 0934  Time Out: 1027  Timed Code Treatment Minutes: 53 Minutes  Minutes: 53          Date: 12/3/2024  Patient Name: Priya Lema,   Gender: female      Room: [address] [place] [address]  MRN: [id number]  : 1965  (59 y.o.)  Referring Practitioner: Shireen Murillo MD  Diagnosis: Pyelonephritis  Additional Pertinent Hx: Per H & P:59 y.o. female with a history of hypertension, hyperlipidemia, COPD, and chronic respiratory failure with hypoxia who was transferred from an outside facility for sepsis secondary to left-sided pyelonephritis with an associated left-sided nephrolithiasis.  The patient reports she presented to the outside facility initially for some left-sided back/flank pain.  She was not having any associated urinary symptoms at that time.  She does report some subjective fevers and chills.  She was found to have a left-sided nephrolithiasis and was transferred to [facility] for urology evaluation.  At this time, she does report some mild pain.  She is on oxygen at baseline.Rapid response  for decreased mentation, afib 's. Given 1L fld bolus. Transferred to ICU and intubated for airway protection. Taken to surgery today for cystoscopy with left ureteral stent. s/p CYSTOSCOPY LEFT URETERAL STENT INSERTION on . Unable to extubate post-op d/t tongue swelling and no cuff leak. Extubated  and transferred to stepdown unit    Restrictions/Precautions:  Restrictions/Precautions: General Precautions, Fall Risk  Position Activity Restriction  Other position/activity restrictions: monitor O2 & HR (2-4L O2 at baseline)     Social/Functional History:  Lives With: Other (comment)  Type of Home: House  Home Layout: Two level, Bed/Bath upstairs, Able to Live on Main level with

## 2024-12-03 NOTE — PROGRESS NOTES
Standard  Bathroom Equipment: None    ADL Assistance: Independent  Homemaking Assistance: Independent  Ambulation Assistance: Independent  Transfer Assistance: Independent  Active : Yes  Additional Comments: Per CM note, Pt lives alone & was on 2-4L O2.    Restrictions/Precautions:  Restrictions/Precautions: General Precautions, Fall Risk  Position Activity Restriction  Other position/activity restrictions: monitor O2 & HR (2-4L O2 at baseline)     SUBJECTIVE: Pt in recliner upon arrival, RN approved session, Pt A&O X 4/4, Pt pleasant and cooperative     PAIN: denies    Vitals: Flowsheet reviewed and vitals WFL prior to session start  Vitals:    12/03/24 0830   BP: 135/60   Pulse: 64   Resp: 21   Temp: 98.2 °F (36.8 °C)   SpO2: 100%   SpO2 and HR WFL throughout mobility on nasal cannula however pt does fatigue quickly and demos SOB with mobility    OBJECTIVE:  Bed Mobility:  Not Tested    Transfers:  Sit to Stand: Contact Guard Assistance, with increased time for completion, cues for hand placement, with verbal cues  Stand to Sit:Contact Guard Assistance, cues for hand placement    Ambulation:  Contact Guard Assistance, with verbal cues , with increased time for completion  Distance: 15ft  Surface: Level Tile  Device: Rolling Walker  Gait Deviations: Forward Flexed Posture, Slow Nia, Decreased Step Length Bilaterally, Lean to Right, Decreased Gait Speed, Decreased Heel Strike Bilaterally, Decreased Foot Clearance Right, Decreased Foot Clearance Left, Unsteady Gait, Increased reliance on assistive device, and pt leaning over onto R elbow during gait     Stairs:  Not Tested    Balance:  Not Tested    Exercise:  Patient was guided in 1 set(s) 10 reps of exercises to both lower extremities: ,Ankle pumps, Glut sets, Quad sets, Hip abduction/adduction, Seated marches, and Long arc quads.  Exercises were completed for increased independence with functional mobility.    Functional Outcome Measures:  [facility] (6  CLICK) BASIC MOBILITY  [facility] Inpatient Mobility Raw Score : 17  AM-PAC Inpatient T-Scale Score : 42.13        Modified Antony Scale:  Not Applicable    ASSESSMENT:  Assessment: Patient progressing toward established goals. Pt demonstrates impairments with strength, balance, endurance, activity tolerance, independence, requires hands on assistance for safety with mobility and would benefit from continued skilled PT improve these impairments, to maximize independence, pt will greatly benefit from continued skilled PT.    Activity Tolerance:  Patient tolerance of  treatment:Good. Fatigued quickly with mobility  Plan: Current Treatment Recommendations: Strengthening, Balance training, Functional mobility training, Endurance training, Neuromuscular re-education, Safety education & training, Therapeutic activities, Transfer training, Gait training, Stair training  General Plan:  (5x GM)    Education:  Learners: Patient  Patient Education: Plan of Care, Transfers, Gait, Verbal Exercise Instruction    Goals:  Patient Goals : none stated  Short Term Goals  Time Frame for Short Term Goals: by discharge  Short Term Goal 1: Pt to perform bed mobility independently for safe return home.  Short Term Goal 2: Pt to perform transfers from various levels for safe transfers.  Short Term Goal 3: Pt to ambulate 30 ft with LRD independently for safe return home.  Short Term Goal 4: Pt to perform stairs with LRD independently for safe return home.  Long Term Goals  Time Frame for Long Term Goals : NA due to short ELOS.    Following session, patient left in safe position with all fall risk precautions in place.

## 2024-12-03 NOTE — RT PROTOCOL NOTE
RT Inhaler-Nebulizer Bronchodilator Protocol Note    There is a bronchodilator order in the chart from a provider indicating to follow the RT Bronchodilator Protocol and there is an “Initiate RT Inhaler-Nebulizer Bronchodilator Protocol” order as well (see protocol at bottom of note).    CXR Findings:  No results found.    The findings from the last RT Protocol Assessment were as follows:   History Pulmonary Disease: Chronic pulmonary disease  Respiratory Pattern: Regular pattern and RR 12-20 bpm  Breath Sounds: Slightly diminished and/or crackles  Cough: Strong, spontaneous, non-productive  Indication for Bronchodilator Therapy: Decreased or absent breath sounds, Wheezing associated with pulm disorder  Bronchodilator Assessment Score: 4    Aerosolized bronchodilator medication orders have been revised according to the RT Inhaler-Nebulizer Bronchodilator Protocol below.    Respiratory Therapist to perform RT Therapy Protocol Assessment initially then follow the protocol.  Repeat RT Therapy Protocol Assessment PRN for score 0-3 or on second treatment, BID, and PRN for scores above 3.    No Indications - adjust the frequency to every 6 hours PRN wheezing or bronchospasm, if no treatments needed after 48 hours then discontinue using Per Protocol order mode.     If indication present, adjust the RT bronchodilator orders based on the Bronchodilator Assessment Score as indicated below.  Use Inhaler orders unless patient has one or more of the following: on home nebulizer, not able to hold breath for 10 seconds, is not alert and oriented, cannot activate and use MDI correctly, or respiratory rate 25 breaths per minute or more, then use the equivalent nebulizer order(s) with same Frequency and PRN reasons based on the score.  If a patient is on this medication at home then do not decrease Frequency below that used at home.    0-3 - enter or revise RT bronchodilator order(s) to equivalent RT Bronchodilator order with

## 2024-12-04 ENCOUNTER — PREP FOR PROCEDURE (OUTPATIENT)
Dept: CARDIOLOGY | Age: 59
End: 2024-12-04

## 2024-12-04 LAB
ANION GAP SERPL CALC-SCNC: 8 MEQ/L (ref 8–16)
BUN SERPL-MCNC: 13 MG/DL (ref 7–22)
CALCIUM SERPL-MCNC: 7.7 MG/DL (ref 8.5–10.5)
CHLORIDE SERPL-SCNC: 102 MEQ/L (ref 98–111)
CO2 SERPL-SCNC: 31 MEQ/L (ref 23–33)
CREAT SERPL-MCNC: 0.8 MG/DL (ref 0.4–1.2)
DEPRECATED RDW RBC AUTO: 57.6 FL (ref 35–45)
ERYTHROCYTE [DISTWIDTH] IN BLOOD BY AUTOMATED COUNT: 19.8 % (ref 11.5–14.5)
FERRITIN SERPL IA-MCNC: 267 NG/ML (ref 10–291)
GFR SERPL CREATININE-BSD FRML MDRD: 84 ML/MIN/1.73M2
GLUCOSE BLD STRIP.AUTO-MCNC: 119 MG/DL (ref 70–108)
GLUCOSE BLD STRIP.AUTO-MCNC: 127 MG/DL (ref 70–108)
GLUCOSE BLD STRIP.AUTO-MCNC: 156 MG/DL (ref 70–108)
GLUCOSE BLD STRIP.AUTO-MCNC: 180 MG/DL (ref 70–108)
GLUCOSE SERPL-MCNC: 109 MG/DL (ref 70–108)
HCT VFR BLD AUTO: 23.9 % (ref 37–47)
HCT VFR BLD AUTO: 24.6 % (ref 37–47)
HEPARIN UNFRACTIONATED: 0.3 U/ML (ref 0.3–0.7)
HEPARIN UNFRACTIONATED: 0.35 U/ML (ref 0.3–0.7)
HGB BLD-MCNC: 7.1 GM/DL (ref 12–16)
HGB BLD-MCNC: 7.7 GM/DL (ref 12–16)
IRON SATN MFR SERPL: 32 % (ref 20–50)
IRON SERPL-MCNC: 58 UG/DL (ref 50–170)
MAGNESIUM SERPL-MCNC: 1.7 MG/DL (ref 1.6–2.4)
MCH RBC QN AUTO: 25.5 PG (ref 26–33)
MCHC RBC AUTO-ENTMCNC: 29.7 GM/DL (ref 32.2–35.5)
MCV RBC AUTO: 86 FL (ref 81–99)
PLATELET # BLD AUTO: 209 THOU/MM3 (ref 130–400)
PMV BLD AUTO: 12 FL (ref 9.4–12.4)
POTASSIUM SERPL-SCNC: 3.6 MEQ/L (ref 3.5–5.2)
RBC # BLD AUTO: 2.78 MILL/MM3 (ref 4.2–5.4)
REASON FOR REJECTION: NORMAL
REJECTED TEST: NORMAL
SODIUM SERPL-SCNC: 141 MEQ/L (ref 135–145)
TIBC SERPL-MCNC: 184 UG/DL (ref 171–450)
WBC # BLD AUTO: 11.2 THOU/MM3 (ref 4.8–10.8)

## 2024-12-04 PROCEDURE — 97535 SELF CARE MNGMENT TRAINING: CPT

## 2024-12-04 PROCEDURE — 82948 REAGENT STRIP/BLOOD GLUCOSE: CPT

## 2024-12-04 PROCEDURE — 6370000000 HC RX 637 (ALT 250 FOR IP)

## 2024-12-04 PROCEDURE — 6370000000 HC RX 637 (ALT 250 FOR IP): Performed by: NURSE PRACTITIONER

## 2024-12-04 PROCEDURE — 6360000002 HC RX W HCPCS: Performed by: INTERNAL MEDICINE

## 2024-12-04 PROCEDURE — 82728 ASSAY OF FERRITIN: CPT

## 2024-12-04 PROCEDURE — 36592 COLLECT BLOOD FROM PICC: CPT

## 2024-12-04 PROCEDURE — 85014 HEMATOCRIT: CPT

## 2024-12-04 PROCEDURE — 94640 AIRWAY INHALATION TREATMENT: CPT

## 2024-12-04 PROCEDURE — 85018 HEMOGLOBIN: CPT

## 2024-12-04 PROCEDURE — 2580000003 HC RX 258: Performed by: PHYSICIAN ASSISTANT

## 2024-12-04 PROCEDURE — 94669 MECHANICAL CHEST WALL OSCILL: CPT

## 2024-12-04 PROCEDURE — 2580000003 HC RX 258: Performed by: INTERNAL MEDICINE

## 2024-12-04 PROCEDURE — 97530 THERAPEUTIC ACTIVITIES: CPT

## 2024-12-04 PROCEDURE — 6360000002 HC RX W HCPCS

## 2024-12-04 PROCEDURE — 36415 COLL VENOUS BLD VENIPUNCTURE: CPT

## 2024-12-04 PROCEDURE — 1200000003 HC TELEMETRY R&B

## 2024-12-04 PROCEDURE — 83540 ASSAY OF IRON: CPT

## 2024-12-04 PROCEDURE — 2700000000 HC OXYGEN THERAPY PER DAY

## 2024-12-04 PROCEDURE — 36430 TRANSFUSION BLD/BLD COMPNT: CPT

## 2024-12-04 PROCEDURE — 83735 ASSAY OF MAGNESIUM: CPT

## 2024-12-04 PROCEDURE — 6370000000 HC RX 637 (ALT 250 FOR IP): Performed by: PHYSICIAN ASSISTANT

## 2024-12-04 PROCEDURE — 99232 SBSQ HOSP IP/OBS MODERATE 35: CPT | Performed by: INTERNAL MEDICINE

## 2024-12-04 PROCEDURE — 83550 IRON BINDING TEST: CPT

## 2024-12-04 PROCEDURE — 85520 HEPARIN ASSAY: CPT

## 2024-12-04 PROCEDURE — 85027 COMPLETE CBC AUTOMATED: CPT

## 2024-12-04 PROCEDURE — 80048 BASIC METABOLIC PNL TOTAL CA: CPT

## 2024-12-04 PROCEDURE — 6370000000 HC RX 637 (ALT 250 FOR IP): Performed by: INTERNAL MEDICINE

## 2024-12-04 RX ORDER — SODIUM CHLORIDE 9 MG/ML
INJECTION, SOLUTION INTRAVENOUS PRN
Status: CANCELLED | OUTPATIENT
Start: 2024-12-04

## 2024-12-04 RX ORDER — SODIUM CHLORIDE 0.9 % (FLUSH) 0.9 %
5-40 SYRINGE (ML) INJECTION EVERY 12 HOURS SCHEDULED
Status: CANCELLED | OUTPATIENT
Start: 2024-12-04

## 2024-12-04 RX ORDER — SODIUM CHLORIDE 0.9 % (FLUSH) 0.9 %
5-40 SYRINGE (ML) INJECTION PRN
Status: CANCELLED | OUTPATIENT
Start: 2024-12-04

## 2024-12-04 RX ORDER — SODIUM CHLORIDE 9 MG/ML
INJECTION, SOLUTION INTRAVENOUS CONTINUOUS
Status: CANCELLED | OUTPATIENT
Start: 2024-12-04

## 2024-12-04 RX ORDER — SODIUM CHLORIDE 9 MG/ML
INJECTION, SOLUTION INTRAVENOUS PRN
Status: DISCONTINUED | OUTPATIENT
Start: 2024-12-04 | End: 2024-12-06 | Stop reason: HOSPADM

## 2024-12-04 RX ORDER — NITROGLYCERIN 0.4 MG/1
0.4 TABLET SUBLINGUAL EVERY 5 MIN PRN
Status: CANCELLED | OUTPATIENT
Start: 2024-12-04

## 2024-12-04 RX ORDER — ASPIRIN 325 MG
325 TABLET ORAL ONCE
Status: CANCELLED | OUTPATIENT
Start: 2024-12-04 | End: 2024-12-04

## 2024-12-04 RX ADMIN — ACETAMINOPHEN 650 MG: 325 TABLET ORAL at 08:00

## 2024-12-04 RX ADMIN — BUDESONIDE 250 MCG: 0.25 INHALANT RESPIRATORY (INHALATION) at 07:53

## 2024-12-04 RX ADMIN — DOCUSATE SODIUM 100 MG: 100 CAPSULE, LIQUID FILLED ORAL at 07:57

## 2024-12-04 RX ADMIN — ALBUTEROL SULFATE 2.5 MG: 2.5 SOLUTION RESPIRATORY (INHALATION) at 21:11

## 2024-12-04 RX ADMIN — METRONIDAZOLE 500 MG: 500 TABLET ORAL at 06:17

## 2024-12-04 RX ADMIN — TIOTROPIUM BROMIDE AND OLODATEROL 2 PUFF: 3.124; 2.736 SPRAY, METERED RESPIRATORY (INHALATION) at 07:53

## 2024-12-04 RX ADMIN — METRONIDAZOLE 500 MG: 500 TABLET ORAL at 16:07

## 2024-12-04 RX ADMIN — ATORVASTATIN CALCIUM 40 MG: 40 TABLET, FILM COATED ORAL at 22:24

## 2024-12-04 RX ADMIN — SODIUM CHLORIDE, PRESERVATIVE FREE 10 ML: 5 INJECTION INTRAVENOUS at 22:24

## 2024-12-04 RX ADMIN — METRONIDAZOLE 500 MG: 500 TABLET ORAL at 22:24

## 2024-12-04 RX ADMIN — HEPARIN SODIUM 17 UNITS/KG/HR: 10000 INJECTION, SOLUTION INTRAVENOUS at 13:05

## 2024-12-04 RX ADMIN — CEFTRIAXONE SODIUM 1000 MG: 1 INJECTION, POWDER, FOR SOLUTION INTRAMUSCULAR; INTRAVENOUS at 08:00

## 2024-12-04 RX ADMIN — INSULIN GLARGINE 25 UNITS: 100 INJECTION, SOLUTION SUBCUTANEOUS at 07:57

## 2024-12-04 RX ADMIN — POLYETHYLENE GLYCOL 3350 17 G: 17 POWDER, FOR SOLUTION ORAL at 07:57

## 2024-12-04 RX ADMIN — SODIUM CHLORIDE, PRESERVATIVE FREE 10 ML: 5 INJECTION INTRAVENOUS at 07:58

## 2024-12-04 RX ADMIN — METOPROLOL SUCCINATE 25 MG: 25 TABLET, FILM COATED, EXTENDED RELEASE ORAL at 07:58

## 2024-12-04 RX ADMIN — BUDESONIDE 250 MCG: 0.25 INHALANT RESPIRATORY (INHALATION) at 21:11

## 2024-12-04 RX ADMIN — ALBUTEROL SULFATE 2.5 MG: 2.5 SOLUTION RESPIRATORY (INHALATION) at 07:53

## 2024-12-04 RX ADMIN — ASPIRIN 81 MG: 81 TABLET, CHEWABLE ORAL at 08:02

## 2024-12-04 ASSESSMENT — ENCOUNTER SYMPTOMS
BLOOD IN STOOL: 0
EYES NEGATIVE: 1
CONSTIPATION: 0
RECTAL PAIN: 0
ANAL BLEEDING: 0
DIARRHEA: 0
COUGH: 0
WHEEZING: 0
ABDOMINAL DISTENTION: 0
ABDOMINAL PAIN: 0
ALLERGIC/IMMUNOLOGIC NEGATIVE: 1
VOMITING: 0
NAUSEA: 0
SHORTNESS OF BREATH: 1

## 2024-12-04 ASSESSMENT — PAIN DESCRIPTION - DESCRIPTORS: DESCRIPTORS: ACHING

## 2024-12-04 ASSESSMENT — PAIN DESCRIPTION - LOCATION: LOCATION: BACK

## 2024-12-04 ASSESSMENT — PAIN SCALES - GENERAL: PAINLEVEL_OUTOF10: 2

## 2024-12-04 NOTE — PROGRESS NOTES
[facility]  STRZ RENAL TELEMETRY 6K  Occupational Therapy  Daily Note    Discharge Recommendations: Subacute/skilled nursing facility  Equipment Recommendations:   monitor pending progress       Time In: 1004  Time Out: 1032  Timed Code Treatment Minutes: 28 Minutes  Minutes: 28          Date: 2024  Patient Name: Priya Lema,   Gender: female      Room: [address] [place] [address]  MRN: [id number]  : 1965  (59 y.o.)  Referring Practitioner: Shireen Murillo MD  Diagnosis: Pyelonephritis  Additional Pertinent Hx: Per H & P:59 y.o. female with a history of hypertension, hyperlipidemia, COPD, and chronic respiratory failure with hypoxia who was transferred from an outside facility for sepsis secondary to left-sided pyelonephritis with an associated left-sided nephrolithiasis.  The patient reports she presented to the outside facility initially for some left-sided back/flank pain.  She was not having any associated urinary symptoms at that time.  She does report some subjective fevers and chills.  She was found to have a left-sided nephrolithiasis and was transferred to [facility] for urology evaluation.  At this time, she does report some mild pain.  She is on oxygen at baseline.Rapid response  for decreased mentation, afib 's. Given 1L fld bolus. Transferred to ICU and intubated for airway protection. Taken to surgery today for cystoscopy with left ureteral stent. s/p CYSTOSCOPY LEFT URETERAL STENT INSERTION on . Unable to extubate post-op d/t tongue swelling and no cuff leak. Extubated  and transferred to stepdown unit    Restrictions/Precautions:  Restrictions/Precautions: General Precautions, Fall Risk  Position Activity Restriction  Other Position/Activity Restrictions: monitor O2 & HR (2-4L O2 at baseline)      Social/Functional History:  Lives With: Other (comment)  Type of Home: House  Home Layout: Two level, Bed/Bath upstairs, Able to Live on Main level with  Guard Assistance, X 1, with increased time for completion, cues for hand placement, with verbal cues, to/from chair with arms, cues for alignment to surface and for safety with assistive device.      FUNCTIONAL MOBILITY:  Assistive Device: Rolling Walker  Assist Level:  Stand By Assistance, Contact Guard Assistance, X 1, with verbal cues , and with increased time for completion.   Distance: To and from bathroom  Assistance with IV pole management, O2 tubing management, no LOB, impulsive    ADDITIONAL ACTIVITIES:     AM-PAC Inpatient Daily Activity Raw Score: 16  AM-PAC Inpatient ADL T-Scale Score : 35.96  ADL Inpatient CMS 0-100% Score: 53.32  ASSESSMENT:     Activity Tolerance:  Patient tolerance of  treatment: Good treatment tolerance       Plan: Times Per Week: 5x  Current Treatment Recommendations: Functional mobility training, Balance training, Safety education & training, Endurance training, Self-Care / ADL, Strengthening, ROM    Education:  Learners: Patient  Role of OT, Plan of Care, ADL's, IADL's, Home Safety, Importance of Increasing Activity, Fall Prevention, and Assistive Device Safety    Goals  Short Term Goals  Time Frame for Short Term Goals: until discharge  Short Term Goal 1: Pt will tolerate BUE AROM exercises x 10-15 reps with min RBs to increase endurance for BADL  Short Term Goal 2: Pt will complete ADL bathing/dressing routine with Silva and ECT as needed to increase indep & safety with ADL tasks  Short Term Goal 3: Pt will tolerate standing x3-5 minutes with SBA to improve ADL performance.  Short Term Goal 4: Pt will complete functional transfers with SBA with 0-2 cues for safety to increase (I) wit toileting.  Short Term Goal 5: Pt will demonstrate functional mobility using RW to/from BSC with SBA and progressing to/from BR with 0-2 cues for safety.  Additional Goals?: No  Long Term Goals  Time Frame for Long Term Goals : No LTG set d/t short ELOS    Following session, patient left in safe

## 2024-12-04 NOTE — CARE COORDINATION
12/4/24, 2:15 PM EST    DISCHARGE ON GOING EVALUATION    [facility] day: 14  Location: -25/025-A Reason for admit: Pyelonephritis [N12]  Sepsis (HCC) [A41.9]     Procedures: 12/4 PRBC transfusion    Imaging since last note: none    Barriers to Discharge: Hgb 7.1. GI consulted, feel anemia is not due to GI causes, and have signed off. Planning heart cath tomorrow. Nephrology following, plan gentle IVF before and after cath.     PCP: Parmjit Tesfaye MD  Readmission Risk Score: 15    Patient Goals/Plan/Treatment Preferences: [facility] at discharge. Requires medicaid LOC.

## 2024-12-04 NOTE — PROGRESS NOTES
Hospitalist Progress Note      [facility]    [facility]025[facility]A  YOB: 1965  MRN: [id number]   Acct: [id number]   PCP: Parmjit Tesfaye MD  Date of Admission: 11/20/2024    Assessment/Plan:    #Sepsis secondary to left nephrolithiasis complicated by left pyelonephritis  Septic shock - resolved  hematuria  -CT scan at outside facility showed nephrolithiasis on the left side, and cystic characteristic on the right kidney.    -Urine culture positive for E.coli. (11/20). Bclx NGTD. Respiratory panel negative (11/25). UA(11/25) reported no caast, WBC>100, RBC>200. Admitted to the ICU on pressors. Has been successfully weaned off. On cefepime.   -Clinically improving, hemodynamically stable and afebrile.  Remains off pressors.  -Leukocytosis has cleared   -Previous infectious workup with urine culture from 11/20 growing pansensitive E coli.   -Patient previously on cefepime, switched to CTX and flagyl, plan for 7 days of therapy (suspected aspiration pneumonia)    #Anemia.  -Hb down to 7.1. continues to have blood tinged urine in [place].  -Case discussed with urology. Cleared for DAPT. Need to monitor Hb and urine output closely.  -GI consult appreciated.Ok from GI standpoint for cardiac evaluation/intervention and DAPT/anticoagulation therapy if needed.     #Acute on chronic hypoxic hypercapnic respiratory failure in setting of aspiration pneumonia  -On 2 L oxygen at home.   - Intubated on 11/20 for airway protection. CXR 11/26 shows improvement with right opacity and infiltrates. Mild improvement with left pleural effusion.   -Respiratory status improving, weaned back to baseline 2L 11/30/2024  -Continue ceftriaxone, flagyl, end date 12/4/24  -ETT culture with light growth of yeast, likely candida. Likely colonization and would not treat in this situation based on clinical papers/studies  -RVP negative  -Previously  11/20/2024 09:54 PM      Electronically signed by Abbey Cisneros MD on 12/4/2024 at 11:44 AM

## 2024-12-04 NOTE — CONSENT
Informed Consent for Blood Component Transfusion Note    I have discussed with the patient the rationale for blood component transfusion; its benefits in treating or preventing fatigue, organ damage, or death; and its risk which includes mild transfusion reactions, rare risk of blood borne infection, or more serious but rare reactions. I have discussed the alternatives to transfusion, including the risk and consequences of not receiving transfusion. The patient had an opportunity to ask questions and had agreed to proceed with transfusion of blood components.    Electronically signed by YOHANNES Ballesteros CNP on 12/4/24 at 10:05 AM EST

## 2024-12-04 NOTE — PROGRESS NOTES
Cardiology Progress Note      Patient:  Priya Lema  YOB: 1965  MRN: [id number]   Acct: [id number]  Admit Date:  11/20/2024  Primary Cardiologist: mando Zapata   Seen by Dr. Haque     Per prior cardiology consult note-  REASON FOR CONSULT:   New drop in EF, ? Ischemic work up, elevated troponin, new atrial fibrillation       CHIEF COMPLAINT:    Flank pain     HISTORY OF PRESENT ILLNESS:    Priya Lema is a 59 year old female patient with past medical history that includes hypertension, COPD, tobacco abuse, obesity. She has h/o chronic respiratory failure and I s on home oxygen. Her family history is positive for CHF in her mother. Patient is intubated and is unable to provide history. Her family at bedside states that she was referred to a cardiologist last year for swelling in her legs. Echocardiogram on 12/2023 revealed an EF of 50-55%.The patient was admitted to the hospital on 11/20/2024. She had ;eft flank pain and was found to have RAINE. Patient developed worsening hypoxia and is now s/p intubation, being treated for COPD exacerbation. The patient is also being treated for UTI, Pyelonephritis, kidney stones and sepsis. Gram negative bacillin were noted on urine culture. She is followed by Urology and is now s/p left ureteral stent on 11/21/2024. She was noted to have new drop in EF on Echocardiogram with new onset atrial fibrillation and elevated troponin, cardiology consult was requested for further management and possible ischemic evaluation. Echocardiogram on 11/21/2024 revealed an EF of 30-35%.        Subjective (Events in last 24 hours):   Pt in bed - droggy    She can nod head yes and no to answer questions     Noted hematuria in [place] bag - cherry red     Pt nods head no to chest pain or SOB - barely can talk at present     Tele SR no ectopy   BP stable       12/2/24  Pt awake in bed   Answers questions appropriately     No known CAD - use 2L NC at home   No chest pains  normal.     Right ventricle:     - Not well visualized. The cavity size was normal. Wall thickness     was normal. Systolic function was normal.     Ventricular septum:     - Thickness was normal.     Pulmonic valve:     - Not visualized.     Doppler:     - Transvalvular velocity was within the normal range. There was no     significant regurgitation.     Tricuspid valve:     - The valve was structurally normal. The leaflets were normal     thickness.     Doppler:     - Transvalvular velocity was within the normal range. There was no     evidence for stenosis. There was trivial to mild regurgitation.     Pulmonary artery:     - Systolic pressure was at the upper limits of normal.     Right atrium:     - Not well visualized. The atrium was normal in size.     Pericardium:     - There was no pericardial effusion.     Systemic veins:     - Not visualized.         Stress:     Left Heart Cath:     Lab Data:    Cardiac Enzymes:  No results for input(s): \"CKTOTAL\", \"CKMB\", \"CKMBINDEX\", \"TROPONINI\" in the last 72 hours.    CBC:   Lab Results   Component Value Date/Time    WBC 11.2 12/04/2024 06:20 AM    RBC 2.78 12/04/2024 06:20 AM    HGB 7.1 12/04/2024 06:20 AM    HCT 23.9 12/04/2024 06:20 AM     12/04/2024 06:20 AM       CMP:    Lab Results   Component Value Date/Time     12/04/2024 06:20 AM    K 3.6 12/04/2024 06:20 AM     12/04/2024 06:20 AM    CO2 31 12/04/2024 06:20 AM    BUN 13 12/04/2024 06:20 AM    CREATININE 0.8 12/04/2024 06:20 AM    LABGLOM 84 12/04/2024 06:20 AM    GLUCOSE 109 12/04/2024 06:20 AM    CALCIUM 7.7 12/04/2024 06:20 AM       Hepatic Function Panel:    Lab Results   Component Value Date/Time    ALKPHOS 61 11/30/2024 03:30 AM    ALT 36 11/30/2024 03:30 AM    AST 20 11/30/2024 03:30 AM    BILITOT 0.7 11/30/2024 03:30 AM    BILIDIR 0.3 11/20/2024 05:51 PM       Magnesium:    Lab Results   Component Value Date/Time    MG 1.7 12/04/2024 06:20 AM       PT/INR:    Lab Results   Component

## 2024-12-04 NOTE — PLAN OF CARE
Problem: Discharge Planning  Goal: Discharge to home or other facility with appropriate resources  Outcome: Progressing     Problem: Pain  Goal: Verbalizes/displays adequate comfort level or baseline comfort level  Outcome: Progressing     Problem: Neurosensory - Adult  Goal: Achieves stable or improved neurological status  Outcome: Progressing  Goal: Achieves maximal functionality and self care  Outcome: Progressing     Problem: Respiratory - Adult  Goal: Achieves optimal ventilation and oxygenation  12/3/2024 2147 by Sarah Beth Campos RN  Outcome: Progressing  12/3/2024 2059 by Joyce Rodriguez RCP  Outcome: Progressing  Flowsheets (Taken 12/3/2024 0748 by Sugar Mckeon RCP)  Achieves optimal ventilation and oxygenation:   Assess for changes in respiratory status   Respiratory therapy support as indicated  Goal: Clear lung sounds  12/3/2024 2147 by Sarah Beth Campos RN  Outcome: Progressing  12/3/2024 2059 by Joyce Rodriguez RCP  Outcome: Progressing     Problem: Cardiovascular - Adult  Goal: Maintains optimal cardiac output and hemodynamic stability  Outcome: Progressing  Goal: Absence of cardiac dysrhythmias or at baseline  Outcome: Progressing     Problem: Skin/Tissue Integrity - Adult  Goal: Skin integrity remains intact  Outcome: Progressing  Goal: Incisions, wounds, or drain sites healing without S/S of infection  Outcome: Progressing     Problem: Musculoskeletal - Adult  Goal: Return mobility to safest level of function  Outcome: Progressing  Goal: Maintain proper alignment of affected body part  Outcome: Progressing  Goal: Return ADL status to a safe level of function  Outcome: Progressing     Problem: Gastrointestinal - Adult  Goal: Minimal or absence of nausea and vomiting  Outcome: Progressing  Goal: Maintains or returns to baseline bowel function  Outcome: Progressing  Goal: Maintains adequate nutritional intake  Outcome: Progressing     Problem: Genitourinary - Adult  Goal: Absence of urinary

## 2024-12-04 NOTE — CARE COORDINATION
12/4/24, 9:21 AM EST    DISCHARGE PLANNING EVALUATION    GUERA spoke with [facility], they can accept pt at discharge.  Pt will require a Medicaid LOC.    SW updated pt.  Pt stated that her son or daughter will transport pt.

## 2024-12-04 NOTE — PLAN OF CARE
Problem: Respiratory - Adult  Goal: Achieves optimal ventilation and oxygenation  12/4/2024 0807 by Elfego Colmenares, FELISHA  Outcome: Progressing     Problem: Respiratory - Adult  Goal: Clear lung sounds  12/4/2024 0807 by Elfego Colmenares, MANOJP  Outcome: Progressing     Patient mutually agreed on goals.

## 2024-12-04 NOTE — PROGRESS NOTES
[facility]   Nephrology progress note  12/4/2024, 9:50 AM      Pt Name:    Priya Lema  MRN:     [id number]     YOB: 1965  Admit Date:    11/20/2024  4:47 PM    Chief Complaint: Nephrology following for cardiac catherization clearance    Subjective:  Patient was seen and examined this morning  No chest pain or shortness of breath  Afib RVR on telemetry overnight, 2 episodes, 130s, 140s tachycardia  Multiple episodes PVCs overnight  Still has red tinged urine, improving compared to previously  Feels okay    Objective:  24HR INTAKE/OUTPUT:    Intake/Output Summary (Last 24 hours) at 12/4/2024 0950  Last data filed at 12/3/2024 2349  Gross per 24 hour   Intake 1887.87 ml   Output 930 ml   Net 957.87 ml         I/O last 3 completed shifts:  In: 2687.9 [P.O.:1720; I.V.:967.9]  Out: 2680 [Urine:2680]  No intake/output data recorded.   Admission weight: 95.6 kg (210 lb 12.2 oz)  Wt Readings from Last 3 Encounters:   12/04/24 92.3 kg (203 lb 7.8 oz)        Vitals :   Vitals:    12/03/24 2347 12/04/24 0333 12/04/24 0745 12/04/24 0754   BP: (!) 141/68 138/70 (!) 143/83    Pulse: 66 68 73    Resp: 16 18 20    Temp: 98.7 °F (37.1 °C) 97.9 °F (36.6 °C) 98.1 °F (36.7 °C)    TempSrc: Axillary Oral Oral    SpO2: 100% 100% 100% 100%   Weight:  92.3 kg (203 lb 7.8 oz)     Height:           Physical examination  General Appearance: alert and cooperative with exam, appears comfortable, no distress  Mouth/Throat: Oral mucosa moist  Neck: No JVD  Lungs: Air entry B/L, no rales, no use of accessory muscles  Heart:  S1, S2 heard  GI: soft, non-tender, no guarding  Extremities: +1 bilateral LE edema    Medications:  Infusion:    heparin (PORCINE) Infusion 17 Units/kg/hr (12/04/24 0105)    sodium chloride Stopped (11/24/24 0413)    dextrose       Meds:    albuterol  2.5 mg Nebulization BID RT    [Held by provider] bumetanide  1 mg Oral BID    docusate sodium  100 mg Oral Daily    metroNIDAZOLE  500 mg  Oral 3 times per day    metoprolol succinate  25 mg Oral Daily    [Held by provider] spironolactone  25 mg Oral Daily    insulin glargine  25 Units SubCUTAneous Daily    insulin lispro  0-8 Units SubCUTAneous 4x Daily AC & HS    aspirin  81 mg Per G Tube Daily    atorvastatin  40 mg Per G Tube Nightly    senna  8.8 mg Per G Tube Nightly    polyethylene glycol  17 g Oral Daily    budesonide  250 mcg Nebulization BID RT    sodium chloride flush  5-40 mL IntraVENous 2 times per day    [Held by provider] lisinopril  40 mg Oral Daily    tiotropium-olodaterol  2 puff Inhalation Daily     Meds prn: potassium chloride **OR** potassium chloride, potassium chloride **OR** potassium alternative oral replacement **OR** potassium chloride, hydrALAZINE, hydrOXYzine HCl, heparin (porcine), heparin (porcine), albuterol sulfate HFA, sodium chloride flush, sodium chloride, ondansetron **OR** ondansetron, polyethylene glycol, acetaminophen **OR** acetaminophen, glucose, dextrose bolus **OR** dextrose bolus, glucagon (rDNA), dextrose     Lab Data :  CBC:   Recent Labs     12/02/24  0415 12/02/24  1415 12/03/24  0545 12/03/24  0645 12/04/24  0620   WBC 11.0*  --   --  9.9 11.2*   HGB 7.1*   < > 7.7* 7.2* 7.1*   HCT 22.9*   < > 24.5* 23.0* 23.9*     --   --  171 209    < > = values in this interval not displayed.     CMP:  Recent Labs     12/02/24  0415 12/03/24  0545 12/03/24  0645 12/04/24  0620    140 146* 141   K 3.5 3.4* 3.8 3.6    100 103 102   CO2 31 33 31 31   BUN 22 17 18 13   CREATININE 0.9 1.0 1.1 0.8   GLUCOSE 107 129* 115* 109*   CALCIUM 7.4* 7.4* 7.5* 7.7*   MG 1.4*  --  2.0 1.7     Hepatic: No results for input(s): \"LABALBU\", \"AST\", \"ALT\", \"BILITOT\", \"ALKPHOS\" in the last 72 hours.    Invalid input(s): \"ALB\"      Assessment and Plan:  Renal - RAINE, resolved.  Baseline creatinine 0.7,  November 2023. Initial creatinine 2.9 this hospitalization.  Initial FENa 0.6% -prerenal etiology from sepsis.  Etiology

## 2024-12-04 NOTE — PLAN OF CARE
Problem: Respiratory - Adult  Goal: Achieves optimal ventilation and oxygenation  12/3/2024 2059 by Joyce Rodriguez RCP  Outcome: Progressing       Problem: Respiratory - Adult  Goal: Clear lung sounds  12/3/2024 2059 by Joyce Rodriguez RCP  Outcome: Progressing   Patient mutually agreed on goals.

## 2024-12-04 NOTE — DISCHARGE INSTR - COC
Continuity of Care Form    Patient Name: Priya Huynh   :  1965  MRN:  [id number]    Admit date:  2024  Discharge date:  24    Code Status Order: Full Code   Advance Directives:   Advance Care Flowsheet Documentation             Admitting Physician:  Krishna Sánchez MD  PCP: Parmjit Tesfaye MD    Discharging Nurse: Manasa BLANCAS  Discharging Hospital Unit/Room#: 6K-25/025-A  Discharging Unit Phone Number: [phone]    Emergency Contact:   Extended Emergency Contact Information  Primary Emergency Contact: Sara Huynh  Mobile Phone: [phone]  Relation: Daughter-in-Law  Secondary Emergency Contact: viky huynh  Mobile Phone: [phone]  Relation: Child    Past Surgical History:  Past Surgical History:   Procedure Laterality Date    CYSTOSCOPY Left 2024    CYSTOSCOPY LEFT URETERAL STENT INSERTION performed by Zurdo Richardson MD at [facility] OR       Immunization History:   Immunization History   Administered Date(s) Administered    COVID-19, PFIZER PURPLE top, DILUTE for use, (age 12 y+), 30mcg/0.3mL 2021, 2021       Active Problems:  Patient Active Problem List   Diagnosis Code    Pyelonephritis N12    Sepsis ([facility]) A41.9    Acute on chronic respiratory failure with hypoxia and hypercapnia J96.21, J96.22    RAINE (acute kidney injury) ([facility]) N17.9    Metabolic acidosis E87.20    Elevated troponin level R79.89    Type 2 diabetes mellitus with hyperglycemia ([facility]) E11.65    COPD exacerbation ([facility]) J44.1    Septic shock ([facility]) A41.9, R65.21    Acute HFrEF (heart failure with reduced ejection fraction) ([facility]) I50.21    PAF (paroxysmal atrial fibrillation) ([facility]) I48.0    Ureteral calculus, left N20.1    Gross hematuria R31.0    Diabetic nephropathy with proteinuria ([facility]) E11.21    Essential hypertension I10    Preoperative clearance Z01.818       Isolation/Infection:   Isolation            No Isolation          Patient Infection Status       None to display            Nurse  Assessment:  Last Vital Signs: BP (!) 143/83   Pulse 73   Temp 97.9 °F (36.6 °C) (Oral)   Resp 20   Ht 1.499 m (4' 11\")   Wt 92.3 kg (203 lb 7.8 oz)   SpO2 100%   BMI 41.10 kg/m²     Last documented pain score (0-10 scale): Pain Level: 2  Last Weight:   Wt Readings from Last 1 Encounters:   12/04/24 92.3 kg (203 lb 7.8 oz)     Mental Status:  oriented and alert    IV Access:  - None    Nursing Mobility/ADLs:  Walking   Assisted  Transfer  Assisted  Bathing  Assisted  Dressing  Assisted  Toileting  Assisted  Feeding  Independent  Med Admin  Assisted  Med Delivery   whole    Wound Care Documentation and Therapy:        Elimination:  Continence:   Bowel: No  Bladder: No  Urinary Catheter: Removal Date 12/6/24    Colostomy/Ileostomy/Ileal Conduit: No       Date of Last BM: 12/6/24    Intake/Output Summary (Last 24 hours) at 12/4/2024 0831  Last data filed at 12/3/2024 2349  Gross per 24 hour   Intake 1887.87 ml   Output 930 ml   Net 957.87 ml     I/O last 3 completed shifts:  In: 2687.9 [P.O.:1720; I.V.:967.9]  Out: 2680 [Urine:2680]    Safety Concerns:     At Risk for Falls    Impairments/Disabilities:      None    Nutrition Therapy:  Current Nutrition Therapy:   - Oral Diet:  General    Routes of Feeding: Oral  Liquids: Thin Liquids  Daily Fluid Restriction: no  Last Modified Barium Swallow with Video (Video Swallowing Test): not done    Treatments at the Time of Hospital Discharge:   Respiratory Treatments: oxygen   Oxygen Therapy:  is on oxygen at 2 L/min per nasal cannula.  Ventilator:    - No ventilator support    Rehab Therapies: Physical Therapy and Occupational Therapy  Weight Bearing Status/Restrictions: No weight bearing restrictions  Other Medical Equipment (for information only, NOT a DME order):  walker  Other Treatments: n/a    Patient's personal belongings (please select all that are sent with patient):  None    RN SIGNATURE:  Manasa BLANCAS    CASE MANAGEMENT/SOCIAL WORK SECTION    Inpatient Status

## 2024-12-04 NOTE — CONSULTS
Consult History & Physical      Patient:  Priya Lema  YOB: 1965  MRN: [id number]     Acct: [id number]  Code Status: Full Code  PCP: Parmjit Tesfaye MD      Chief Complaint:  No chief complaint on file.      Date of Service: Pt seen/examined in consultation on 12/4/2024    History Of Present Illness:      Priya Lema  is a 59 y.o. female who we are asked to see/evaluate by Abbey Cisneros MD for medical management of acute on chronic anemia. PMHx includes HTN, HLD, COPD, and Chronic respiratory failure with hypoxia on home oxygen.  The patient was transferred from an OSH 11/20/24 due to sepsis secondary to left-sided pyelonephritis with left sided neprholithiasis and RAINE.  Patient has had an extensive hospital stay, we were consulted today to evaluate possible GI cause for acute on chronic anemia.  Denies any heartburn or indigestion.  Denies any previous EGD/Colonoscopy.  Denies any constipation or diarrhea. Denies any melena, hematemesis, or hematochezia.     Past Medical History:    Past Medical History:   Diagnosis Date    COPD (chronic obstructive pulmonary disease) (HCC)     Diabetes mellitus (HCC)     Hyperlipidemia     Hypertension     CARROLL (obstructive sleep apnea)        Home Medications:  Prior to Admission medications    Not on File       Surgical History:  Past Surgical History:   Procedure Laterality Date    CYSTOSCOPY Left 11/21/2024    CYSTOSCOPY LEFT URETERAL STENT INSERTION performed by Zurdo Richardson MD at [facility] OR       Family History:  No family history on file.    Past GI History:  Denies any EGD or Colonoscopy in the past.   Denies any family history of GI cancers.     Allergies:  Patient has no known allergies.    Social History:   TOBACCO:   has no history on file for tobacco use.  ETOH:   has no history on file for alcohol use.    Review of Systems   Constitutional:  Positive for activity change, appetite change and fatigue. Negative for fever and unexpected

## 2024-12-04 NOTE — PROGRESS NOTES
[facility]  PHYSICAL THERAPY MISSED TREATMENT NOTE  [facility] RENAL TELEMETRY 6K    Date: 2024  Patient Name: Priya Lema        MRN: [id number]   : 1965  (59 y.o.)  Gender: female   Referring Practitioner: Shireen Murillo MD            REASON FOR MISSED TREATMENT:  Missed Treat.      Pt receiving blood at time of attempt. 0052

## 2024-12-05 ENCOUNTER — APPOINTMENT (OUTPATIENT)
Age: 59
End: 2024-12-05
Attending: STUDENT IN AN ORGANIZED HEALTH CARE EDUCATION/TRAINING PROGRAM
Payer: COMMERCIAL

## 2024-12-05 PROBLEM — I42.0 DILATED CARDIOMYOPATHY (HCC): Status: ACTIVE | Noted: 2024-12-05

## 2024-12-05 PROBLEM — R31.9 HEMATURIA: Status: ACTIVE | Noted: 2024-12-05

## 2024-12-05 PROBLEM — Z72.0 TOBACCO ABUSE: Status: ACTIVE | Noted: 2024-12-05

## 2024-12-05 PROBLEM — Z87.09 HISTORY OF COPD: Status: ACTIVE | Noted: 2024-12-05

## 2024-12-05 PROBLEM — J96.00 ACUTE RESPIRATORY FAILURE: Status: ACTIVE | Noted: 2024-12-05

## 2024-12-05 PROBLEM — J18.9 PNEUMONIA DUE TO INFECTIOUS ORGANISM: Status: ACTIVE | Noted: 2024-12-05

## 2024-12-05 PROBLEM — D64.9 ANEMIA: Status: ACTIVE | Noted: 2024-12-05

## 2024-12-05 PROBLEM — Z96.0 S/P URETERAL STENT PLACEMENT: Status: ACTIVE | Noted: 2024-12-05

## 2024-12-05 LAB
ANION GAP SERPL CALC-SCNC: 10 MEQ/L (ref 8–16)
APTT PPP: 75.8 SECONDS (ref 22–38)
BUN SERPL-MCNC: 11 MG/DL (ref 7–22)
CALCIUM SERPL-MCNC: 8.1 MG/DL (ref 8.5–10.5)
CHLORIDE SERPL-SCNC: 101 MEQ/L (ref 98–111)
CHOLEST SERPL-MCNC: 100 MG/DL (ref 100–199)
CO2 SERPL-SCNC: 29 MEQ/L (ref 23–33)
CREAT SERPL-MCNC: 0.8 MG/DL (ref 0.4–1.2)
DEPRECATED RDW RBC AUTO: 53.6 FL (ref 35–45)
ECHO BSA: 1.99 M2
ECHO BSA: 1.99 M2
ECHO LA DIAMETER INDEX: 1.73 CM/M2
ECHO LA DIAMETER: 3.2 CM
ECHO LV EDV A2C: 147 ML
ECHO LV EDV A4C: 129 ML
ECHO LV EDV INDEX A4C: 70 ML/M2
ECHO LV EDV NDEX A2C: 79 ML/M2
ECHO LV EJECTION FRACTION A2C: 59 %
ECHO LV EJECTION FRACTION A4C: 58 %
ECHO LV EJECTION FRACTION BIPLANE: 61 % (ref 55–100)
ECHO LV ESV A2C: 60 ML
ECHO LV ESV A4C: 54 ML
ECHO LV ESV INDEX A2C: 32 ML/M2
ECHO LV ESV INDEX A4C: 29 ML/M2
ECHO LV FRACTIONAL SHORTENING: 35 % (ref 28–44)
ECHO LV INTERNAL DIMENSION DIASTOLE INDEX: 2.92 CM/M2
ECHO LV INTERNAL DIMENSION DIASTOLIC: 5.4 CM (ref 3.9–5.3)
ECHO LV INTERNAL DIMENSION SYSTOLIC INDEX: 1.89 CM/M2
ECHO LV INTERNAL DIMENSION SYSTOLIC: 3.5 CM
ECHO LV IVSD: 1 CM (ref 0.6–0.9)
ECHO LV MASS 2D: 220.6 G (ref 67–162)
ECHO LV MASS INDEX 2D: 119.2 G/M2 (ref 43–95)
ECHO LV POSTERIOR WALL DIASTOLIC: 1.1 CM (ref 0.6–0.9)
ECHO LV RELATIVE WALL THICKNESS RATIO: 0.41
ECHO RV INTERNAL DIMENSION: 2.9 CM
EKG ATRIAL RATE: 63 BPM
EKG P AXIS: 59 DEGREES
EKG P-R INTERVAL: 108 MS
EKG Q-T INTERVAL: 410 MS
EKG QRS DURATION: 88 MS
EKG QTC CALCULATION (BAZETT): 419 MS
EKG R AXIS: 25 DEGREES
EKG T AXIS: 70 DEGREES
EKG VENTRICULAR RATE: 63 BPM
ERYTHROCYTE [DISTWIDTH] IN BLOOD BY AUTOMATED COUNT: 19.9 % (ref 11.5–14.5)
GFR SERPL CREATININE-BSD FRML MDRD: 84 ML/MIN/1.73M2
GLUCOSE BLD STRIP.AUTO-MCNC: 121 MG/DL (ref 70–108)
GLUCOSE BLD STRIP.AUTO-MCNC: 124 MG/DL (ref 70–108)
GLUCOSE BLD STRIP.AUTO-MCNC: 161 MG/DL (ref 70–108)
GLUCOSE BLD STRIP.AUTO-MCNC: 167 MG/DL (ref 70–108)
GLUCOSE BLD STRIP.AUTO-MCNC: 201 MG/DL (ref 70–108)
GLUCOSE SERPL-MCNC: 120 MG/DL (ref 70–108)
HCT VFR BLD AUTO: 26.4 % (ref 37–47)
HDLC SERPL-MCNC: 30 MG/DL
HEPARIN UNFRACTIONATED: 0.44 U/ML (ref 0.3–0.7)
HGB BLD-MCNC: 8.3 GM/DL (ref 12–16)
INR PPP: 1.15 (ref 0.85–1.13)
LDLC SERPL CALC-MCNC: 32 MG/DL
MAGNESIUM SERPL-MCNC: 1.7 MG/DL (ref 1.6–2.4)
MCH RBC QN AUTO: 25.6 PG (ref 26–33)
MCHC RBC AUTO-ENTMCNC: 31.4 GM/DL (ref 32.2–35.5)
MCV RBC AUTO: 81.5 FL (ref 81–99)
PLATELET # BLD AUTO: 213 THOU/MM3 (ref 130–400)
PMV BLD AUTO: 10.9 FL (ref 9.4–12.4)
POTASSIUM SERPL-SCNC: 4 MEQ/L (ref 3.5–5.2)
RBC # BLD AUTO: 3.24 MILL/MM3 (ref 4.2–5.4)
SODIUM SERPL-SCNC: 140 MEQ/L (ref 135–145)
TRIGL SERPL-MCNC: 189 MG/DL (ref 0–199)
WBC # BLD AUTO: 9.7 THOU/MM3 (ref 4.8–10.8)

## 2024-12-05 PROCEDURE — 2709999900 HC NON-CHARGEABLE SUPPLY: Performed by: INTERNAL MEDICINE

## 2024-12-05 PROCEDURE — C1894 INTRO/SHEATH, NON-LASER: HCPCS | Performed by: INTERNAL MEDICINE

## 2024-12-05 PROCEDURE — 93325 DOPPLER ECHO COLOR FLOW MAPG: CPT | Performed by: NUCLEAR MEDICINE

## 2024-12-05 PROCEDURE — 6370000000 HC RX 637 (ALT 250 FOR IP): Performed by: NURSE PRACTITIONER

## 2024-12-05 PROCEDURE — 6360000002 HC RX W HCPCS

## 2024-12-05 PROCEDURE — 99152 MOD SED SAME PHYS/QHP 5/>YRS: CPT | Performed by: INTERNAL MEDICINE

## 2024-12-05 PROCEDURE — 93010 ELECTROCARDIOGRAM REPORT: CPT | Performed by: INTERNAL MEDICINE

## 2024-12-05 PROCEDURE — 85610 PROTHROMBIN TIME: CPT

## 2024-12-05 PROCEDURE — 93005 ELECTROCARDIOGRAM TRACING: CPT | Performed by: PHYSICIAN ASSISTANT

## 2024-12-05 PROCEDURE — 2580000003 HC RX 258: Performed by: INTERNAL MEDICINE

## 2024-12-05 PROCEDURE — 83735 ASSAY OF MAGNESIUM: CPT

## 2024-12-05 PROCEDURE — 1200000003 HC TELEMETRY R&B

## 2024-12-05 PROCEDURE — 80048 BASIC METABOLIC PNL TOTAL CA: CPT

## 2024-12-05 PROCEDURE — 80061 LIPID PANEL: CPT

## 2024-12-05 PROCEDURE — 6360000002 HC RX W HCPCS: Performed by: INTERNAL MEDICINE

## 2024-12-05 PROCEDURE — 99232 SBSQ HOSP IP/OBS MODERATE 35: CPT | Performed by: INTERNAL MEDICINE

## 2024-12-05 PROCEDURE — 85027 COMPLETE CBC AUTOMATED: CPT

## 2024-12-05 PROCEDURE — 94640 AIRWAY INHALATION TREATMENT: CPT

## 2024-12-05 PROCEDURE — 93321 DOPPLER ECHO F-UP/LMTD STD: CPT | Performed by: NUCLEAR MEDICINE

## 2024-12-05 PROCEDURE — 97530 THERAPEUTIC ACTIVITIES: CPT

## 2024-12-05 PROCEDURE — 94669 MECHANICAL CHEST WALL OSCILL: CPT

## 2024-12-05 PROCEDURE — 97110 THERAPEUTIC EXERCISES: CPT

## 2024-12-05 PROCEDURE — 93308 TTE F-UP OR LMTD: CPT

## 2024-12-05 PROCEDURE — 6370000000 HC RX 637 (ALT 250 FOR IP): Performed by: INTERNAL MEDICINE

## 2024-12-05 PROCEDURE — 93308 TTE F-UP OR LMTD: CPT | Performed by: NUCLEAR MEDICINE

## 2024-12-05 PROCEDURE — 6360000004 HC RX CONTRAST MEDICATION: Performed by: INTERNAL MEDICINE

## 2024-12-05 PROCEDURE — 93458 L HRT ARTERY/VENTRICLE ANGIO: CPT | Performed by: INTERNAL MEDICINE

## 2024-12-05 PROCEDURE — 85520 HEPARIN ASSAY: CPT

## 2024-12-05 PROCEDURE — 82948 REAGENT STRIP/BLOOD GLUCOSE: CPT

## 2024-12-05 PROCEDURE — 92526 ORAL FUNCTION THERAPY: CPT

## 2024-12-05 PROCEDURE — 85730 THROMBOPLASTIN TIME PARTIAL: CPT

## 2024-12-05 PROCEDURE — 6370000000 HC RX 637 (ALT 250 FOR IP): Performed by: PHYSICIAN ASSISTANT

## 2024-12-05 PROCEDURE — 2700000000 HC OXYGEN THERAPY PER DAY

## 2024-12-05 PROCEDURE — B2151ZZ FLUOROSCOPY OF LEFT HEART USING LOW OSMOLAR CONTRAST: ICD-10-PCS | Performed by: INTERNAL MEDICINE

## 2024-12-05 PROCEDURE — B2111ZZ FLUOROSCOPY OF MULTIPLE CORONARY ARTERIES USING LOW OSMOLAR CONTRAST: ICD-10-PCS | Performed by: INTERNAL MEDICINE

## 2024-12-05 PROCEDURE — 2580000003 HC RX 258: Performed by: PHYSICIAN ASSISTANT

## 2024-12-05 PROCEDURE — 36415 COLL VENOUS BLD VENIPUNCTURE: CPT

## 2024-12-05 PROCEDURE — 4A023N7 MEASUREMENT OF CARDIAC SAMPLING AND PRESSURE, LEFT HEART, PERCUTANEOUS APPROACH: ICD-10-PCS | Performed by: INTERNAL MEDICINE

## 2024-12-05 PROCEDURE — 99232 SBSQ HOSP IP/OBS MODERATE 35: CPT | Performed by: NURSE PRACTITIONER

## 2024-12-05 PROCEDURE — 2500000003 HC RX 250 WO HCPCS: Performed by: INTERNAL MEDICINE

## 2024-12-05 PROCEDURE — C1769 GUIDE WIRE: HCPCS | Performed by: INTERNAL MEDICINE

## 2024-12-05 RX ORDER — ACETAMINOPHEN 325 MG/1
650 TABLET ORAL EVERY 4 HOURS PRN
Status: DISCONTINUED | OUTPATIENT
Start: 2024-12-05 | End: 2024-12-06 | Stop reason: HOSPADM

## 2024-12-05 RX ORDER — NITROGLYCERIN 0.4 MG/1
0.4 TABLET SUBLINGUAL EVERY 5 MIN PRN
Status: DISCONTINUED | OUTPATIENT
Start: 2024-12-05 | End: 2024-12-06 | Stop reason: HOSPADM

## 2024-12-05 RX ORDER — SODIUM CHLORIDE 9 MG/ML
INJECTION, SOLUTION INTRAVENOUS CONTINUOUS
Status: ACTIVE | OUTPATIENT
Start: 2024-12-05 | End: 2024-12-05

## 2024-12-05 RX ORDER — SODIUM CHLORIDE 0.9 % (FLUSH) 0.9 %
5-40 SYRINGE (ML) INJECTION EVERY 12 HOURS SCHEDULED
Status: DISCONTINUED | OUTPATIENT
Start: 2024-12-05 | End: 2024-12-06 | Stop reason: SDUPTHER

## 2024-12-05 RX ORDER — SODIUM CHLORIDE 9 MG/ML
INJECTION, SOLUTION INTRAVENOUS PRN
Status: DISCONTINUED | OUTPATIENT
Start: 2024-12-05 | End: 2024-12-06 | Stop reason: HOSPADM

## 2024-12-05 RX ORDER — ATROPINE SULFATE 0.4 MG/ML
0.5 INJECTION, SOLUTION INTRAVENOUS
Status: ACTIVE | OUTPATIENT
Start: 2024-12-05 | End: 2024-12-06

## 2024-12-05 RX ORDER — ASPIRIN 325 MG
325 TABLET ORAL ONCE
Status: COMPLETED | OUTPATIENT
Start: 2024-12-05 | End: 2024-12-05

## 2024-12-05 RX ORDER — SODIUM CHLORIDE 9 MG/ML
INJECTION, SOLUTION INTRAVENOUS PRN
Status: DISCONTINUED | OUTPATIENT
Start: 2024-12-05 | End: 2024-12-06 | Stop reason: SDUPTHER

## 2024-12-05 RX ORDER — SODIUM CHLORIDE 0.9 % (FLUSH) 0.9 %
5-40 SYRINGE (ML) INJECTION PRN
Status: DISCONTINUED | OUTPATIENT
Start: 2024-12-05 | End: 2024-12-06 | Stop reason: SDUPTHER

## 2024-12-05 RX ORDER — SODIUM CHLORIDE 9 MG/ML
INJECTION, SOLUTION INTRAVENOUS CONTINUOUS
Status: DISCONTINUED | OUTPATIENT
Start: 2024-12-05 | End: 2024-12-06 | Stop reason: SDUPTHER

## 2024-12-05 RX ORDER — FENTANYL CITRATE 50 UG/ML
INJECTION, SOLUTION INTRAMUSCULAR; INTRAVENOUS PRN
Status: DISCONTINUED | OUTPATIENT
Start: 2024-12-05 | End: 2024-12-05 | Stop reason: HOSPADM

## 2024-12-05 RX ORDER — MIDAZOLAM HYDROCHLORIDE 1 MG/ML
INJECTION, SOLUTION INTRAMUSCULAR; INTRAVENOUS PRN
Status: DISCONTINUED | OUTPATIENT
Start: 2024-12-05 | End: 2024-12-05 | Stop reason: HOSPADM

## 2024-12-05 RX ORDER — IOPAMIDOL 755 MG/ML
INJECTION, SOLUTION INTRAVASCULAR PRN
Status: DISCONTINUED | OUTPATIENT
Start: 2024-12-05 | End: 2024-12-05 | Stop reason: HOSPADM

## 2024-12-05 RX ADMIN — BUDESONIDE 250 MCG: 0.25 INHALANT RESPIRATORY (INHALATION) at 07:56

## 2024-12-05 RX ADMIN — ASPIRIN 325 MG: 325 TABLET ORAL at 08:32

## 2024-12-05 RX ADMIN — METOPROLOL SUCCINATE 25 MG: 25 TABLET, FILM COATED, EXTENDED RELEASE ORAL at 08:27

## 2024-12-05 RX ADMIN — TIOTROPIUM BROMIDE AND OLODATEROL 2 PUFF: 3.124; 2.736 SPRAY, METERED RESPIRATORY (INHALATION) at 07:56

## 2024-12-05 RX ADMIN — ASPIRIN 81 MG: 81 TABLET, CHEWABLE ORAL at 08:26

## 2024-12-05 RX ADMIN — SODIUM CHLORIDE: 9 INJECTION, SOLUTION INTRAVENOUS at 05:43

## 2024-12-05 RX ADMIN — INSULIN LISPRO 2 UNITS: 100 INJECTION, SOLUTION INTRAVENOUS; SUBCUTANEOUS at 17:05

## 2024-12-05 RX ADMIN — SODIUM CHLORIDE: 9 INJECTION, SOLUTION INTRAVENOUS at 18:12

## 2024-12-05 RX ADMIN — INSULIN GLARGINE 25 UNITS: 100 INJECTION, SOLUTION SUBCUTANEOUS at 12:08

## 2024-12-05 RX ADMIN — APIXABAN 5 MG: 5 TABLET, FILM COATED ORAL at 21:26

## 2024-12-05 RX ADMIN — ACETAMINOPHEN 650 MG: 325 TABLET ORAL at 21:25

## 2024-12-05 RX ADMIN — ATORVASTATIN CALCIUM 40 MG: 40 TABLET, FILM COATED ORAL at 19:54

## 2024-12-05 RX ADMIN — ALBUTEROL SULFATE 2.5 MG: 2.5 SOLUTION RESPIRATORY (INHALATION) at 21:48

## 2024-12-05 RX ADMIN — ALBUTEROL SULFATE 2.5 MG: 2.5 SOLUTION RESPIRATORY (INHALATION) at 07:56

## 2024-12-05 RX ADMIN — APIXABAN 5 MG: 5 TABLET, FILM COATED ORAL at 14:54

## 2024-12-05 RX ADMIN — BUDESONIDE 250 MCG: 0.25 INHALANT RESPIRATORY (INHALATION) at 21:48

## 2024-12-05 RX ADMIN — DOCUSATE SODIUM 100 MG: 100 CAPSULE, LIQUID FILLED ORAL at 08:26

## 2024-12-05 RX ADMIN — SODIUM CHLORIDE: 9 INJECTION, SOLUTION INTRAVENOUS at 10:31

## 2024-12-05 ASSESSMENT — PAIN SCALES - GENERAL
PAINLEVEL_OUTOF10: 0
PAINLEVEL_OUTOF10: 5
PAINLEVEL_OUTOF10: 0

## 2024-12-05 ASSESSMENT — PAIN DESCRIPTION - LOCATION: LOCATION: BACK

## 2024-12-05 ASSESSMENT — PAIN SCALES - WONG BAKER
WONGBAKER_NUMERICALRESPONSE: NO HURT
WONGBAKER_NUMERICALRESPONSE: NO HURT

## 2024-12-05 NOTE — PROGRESS NOTES
[facility]  PHYSICAL THERAPY MISSED TREATMENT NOTE  [facility] RENAL TELEMETRY 6K    Date: 2024  Patient Name: Priya Lema        MRN: [id number]   : 1965  (59 y.o.)  Gender: female   Referring Practitioner: Shireen Murillo MD            REASON FOR MISSED TREATMENT:  Missed Treat.  Pt currently working with OT at this time. Will attempt to see pt later if time allows and pt is willing to participate.

## 2024-12-05 NOTE — PROGRESS NOTES
[facility]   Nephrology progress note  12/5/2024, 8:32 AM      Pt Name:    Priya Lema  MRN:     [id number]     YOB: 1965  Admit Date:    11/20/2024  4:47 PM    Chief Complaint: Nephrology following for cardiac catherization clearance    Subjective:  Patient was seen and examined this morning  No chest pain or shortness of breath  Hematuria improving compared to previously, urine is more red-tinged as opposed to odalys hematuria  Feels okay    Objective:  24HR INTAKE/OUTPUT:    Intake/Output Summary (Last 24 hours) at 12/5/2024 0832  Last data filed at 12/5/2024 0544  Gross per 24 hour   Intake 711.5 ml   Output 1150 ml   Net -438.5 ml         I/O last 3 completed shifts:  In: 2079.4 [P.O.:400; I.V.:967.9; Blood:361.5; Other:350]  Out: 1550 [Urine:1550]  No intake/output data recorded.   Admission weight: 95.6 kg (210 lb 12.2 oz)  Wt Readings from Last 3 Encounters:   12/05/24 91.5 kg (201 lb 11.5 oz)        Vitals :   Vitals:    12/05/24 0545 12/05/24 0756 12/05/24 0759 12/05/24 0824   BP:    (!) 148/72   Pulse:  62 72 67   Resp:  20 20 18   Temp:    98.4 °F (36.9 °C)   TempSrc:    Oral   SpO2:  99% 100% 98%   Weight: 91.5 kg (201 lb 11.5 oz)      Height:           Physical examination  General Appearance: alert and cooperative with exam, appears comfortable, no distress  Mouth/Throat: Oral mucosa moist  Neck: No JVD  Lungs: Air entry B/L, no rales, no use of accessory muscles  Heart:  S1, S2 heard  GI: soft, non-tender, no guarding  Extremities: +1 bilateral LE edema    Medications:  Infusion:    sodium chloride 75 mL/hr at 12/05/24 0543    sodium chloride      sodium chloride      heparin (PORCINE) Infusion 17 Units/kg/hr (12/04/24 1305)    sodium chloride Stopped (11/24/24 2450)    dextrose       Meds:    sodium chloride flush  5-40 mL IntraVENous 2 times per day    aspirin  325 mg Oral Once    albuterol  2.5 mg Nebulization BID RT    [Held by provider] bumetanide  1 mg  obstructive uropathy from nephrolithiasis  Possibly CKD 3A/2, needs outpatient nephrology follow-up  Patient unlikely to have CKD - no pathology on renal US, bland UA with minor granular casts  Explained to patient risks and benefits of cardiac cath, patient chose to proceed with procedure  Holding all nephrotoxic agents including Bumex, lisinopril  Gentle IVF 50 cc/H at least 6 hours prior and after cardiac cath  Electrolytes - hypernatremia resolved.   Mixed systolic/diastolic HFrEF, new onset cardiomyopathy  Possible CAD -planning for heart cath on 12/5 per cardiology.  Paroxysmal A-fib RVR  Status post sepsis 2/2 left pyelonephritis  Hematuria, improving: On DAPT  Status post left ureteral stent 11/21  Anemia of chronic disease - in setting of sepsis.  GI consulted to rule out occult GI bleed   AHRF - due to aspiration pneumonia  Type 2 diabetes  Meds reviewed and discussed with patient      D/W patient and RN    Pako Hayes W, DO  [facility]    This report has been created using voice recognition software. It may contain minor errors which are inherent in voice recognition technology

## 2024-12-05 NOTE — PROGRESS NOTES
[facility]  [facility] RENAL TELEMETRY 6K  Occupational Therapy  Daily Note    Discharge Recommendations: Subacute/skilled nursing facility and Patient would benefit from continued OT at discharge  Equipment Recommendations:   monitor pending progress      Time In: 1405  Time Out: 1429  Timed Code Treatment Minutes: 24 Minutes  Minutes: 24          Date: 2024  Patient Name: Priya Lema,   Gender: female      Room: [address] [place] [address]  MRN: [id number]  : 1965  (59 y.o.)  Referring Practitioner: Shireen Murillo MD  Diagnosis: Pyelonephritis  Additional Pertinent Hx: Per H & P:59 y.o. female with a history of hypertension, hyperlipidemia, COPD, and chronic respiratory failure with hypoxia who was transferred from an outside facility for sepsis secondary to left-sided pyelonephritis with an associated left-sided nephrolithiasis.  The patient reports she presented to the outside facility initially for some left-sided back/flank pain.  She was not having any associated urinary symptoms at that time.  She does report some subjective fevers and chills.  She was found to have a left-sided nephrolithiasis and was transferred to [facility] for urology evaluation.  At this time, she does report some mild pain.  She is on oxygen at baseline.Rapid response  for decreased mentation, afib 's. Given 1L fld bolus. Transferred to ICU and intubated for airway protection. Taken to surgery today for cystoscopy with left ureteral stent. s/p CYSTOSCOPY LEFT URETERAL STENT INSERTION on . Unable to extubate post-op d/t tongue swelling and no cuff leak. Extubated  and transferred to stepdown unit    Restrictions/Precautions:  Restrictions/Precautions: General Precautions, Fall Risk  Position Activity Restriction  Other Position/Activity Restrictions: monitor O2 & HR (2-4L O2 at baseline)     Social/Functional History:  Lives With: Other (comment)  Type of Home: House  Home Layout: Two level,

## 2024-12-05 NOTE — CARE COORDINATION
12/5/24, 8:29 AM EST    DISCHARGE ON GOING EVALUATION    [facility] day: 15  Location: [facility]25/025-A Reason for admit: Pyelonephritis [N12]  Sepsis (HCC) [A41.9]     Procedures:   12/5 Heart Cath pending    Imaging since last note: none    Barriers to Discharge: Hgb 8.3 s/p transfusion of PRBC's on 12/4. Diabetes management, GI, Cardiology, Nephrology following, nebs, Heparin drip, diabetes management.    PCP: Parmjit Tesfaye MD  Readmission Risk Score: 13.5    Patient Goals/Plan/Treatment Preferences: From home,  planning [facility] at discharge. SW following. Refer to SW note.

## 2024-12-05 NOTE — PROGRESS NOTES
[facility]  Sedation/Analgesia History & Physical    Pt Name: Priya Lema  Account number: [id number]  MRN: [id number]  YOB: 1965  Provider Performing Procedure: Nando Nolan MD MD [facility]  Primary Care Physician: Parmjit Tesfaye MD  Date: 12/5/2024    PRE-PROCEDURE    Code Status: FULL CODE  Brief History/Pre-Procedure Diagnosis: LV dysfunction    Consent: : I have discussed with the patient risks, benefits, and alternatives (and relevant risks, benefits, and side effects related to alternatives or not receiving care), and likelihood of the success.   The patient and/or representative appear to understand and agree to proceed.  The discussion encompasses risks, benefits, and side effects related to the alternatives and the risks related to not receiving the proposed care, treatment, and services.       PLANNED PROCEDURE   [x]Cath  [x]PCI                []Pacemaker/AICD  []FADUMO             []Cardioversion []Peripheral angiography/PTA  []Other:      VITAL SIGNS   Vitals:    12/05/24 0824   BP: (!) 148/72   Pulse: 67   Resp: 18   Temp: 98.4 °F (36.9 °C)   SpO2: 98%       PHYSICAL:   General: No acute distress  HEENT:  Unremarkable for age  Neck: without increased JVD, carotid pulses 2+ bilaterally without bruits  Heart: RRR, S1 & S2 WNL   Lungs: Clear to auscultation    Abdomen: BS present, without HSM, masses, or tenderness    Extremities: without C,C,E.  Pulses 2+ bilaterally  Mental Status: Alert & Oriented    SEDATION  Planned agent:[x]Midazolam []Meperidine []Sublimaze []Morphine  []Diazepam  [x]Other: fentanyl      ASA Classification:  []1 []2 [x]3 []4 []5  Class 1: A normal healthy patient  Class 2: Pt with mild to moderate systemic disease  Class 3: Severe systemic disease or disturbance  Class 4: Severe systemic disorders that are already life threatening.  Class 5: Moribund pt with little chances of survival, for more than 24 hours.  Mallampati I Airway Classification:    J, DO, 250 mcg at 12/05/24 0756    albuterol sulfate HFA (PROVENTIL;VENTOLIN;PROAIR) 108 (90 Base) MCG/ACT inhaler 2 puff, 2 puff, Inhalation, PRN, Cory Carver P, DO    sodium chloride flush 0.9 % injection 5-40 mL, 5-40 mL, IntraVENous, 2 times per day, Kristian Lambert PA-C, 10 mL at 12/04/24 2224    sodium chloride flush 0.9 % injection 5-40 mL, 5-40 mL, IntraVENous, PRN, Kristian Lambert, PA-C    0.9 % sodium chloride infusion, , IntraVENous, PRN, Kristian Lambert PA-C, Stopped at 11/24/24 0413    ondansetron (ZOFRAN-ODT) disintegrating tablet 4 mg, 4 mg, Oral, Q8H PRN **OR** ondansetron (ZOFRAN) injection 4 mg, 4 mg, IntraVENous, Q6H PRN, Kristian Lambert PA-C, 4 mg at 11/27/24 1926    polyethylene glycol (GLYCOLAX) packet 17 g, 17 g, Oral, Daily PRN, Kristian Lambert PA-C, 17 g at 11/25/24 0747    acetaminophen (TYLENOL) tablet 650 mg, 650 mg, Oral, Q6H PRN, 650 mg at 12/04/24 0800 **OR** acetaminophen (TYLENOL) suppository 650 mg, 650 mg, Rectal, Q6H PRN, Kristian Lambert PA-C    [Held by provider] lisinopril (PRINIVIL;ZESTRIL) tablet 40 mg, 40 mg, Oral, Daily, Kristian Lambert PA-C    glucose chewable tablet 16 g, 4 tablet, Oral, PRN, Kristian Lambert, PA-C    dextrose bolus 10% 125 mL, 125 mL, IntraVENous, PRN **OR** dextrose bolus 10% 250 mL, 250 mL, IntraVENous, PRN, Kristian Lambert, PA-C    glucagon injection 1 mg, 1 mg, SubCUTAneous, PRN, Kristian Lambert PA-C    dextrose 10 % infusion, , IntraVENous, Continuous PRN, Kristian Lambert PA-C    tiotropium-olodaterol (STIOLTO) 2.5-2.5 MCG/ACT inhaler 2 puff, 2 puff, Inhalation, Daily, Payam Salazar MD, 2 puff at 12/05/24 0756  Prior to Admission medications    Not on File     Additional information:                 Nando Nolan MD MD [facility]  Electronically signed 12/5/2024 at 8:26 AM

## 2024-12-05 NOTE — PROGRESS NOTES
input(s): \"CKTOTAL\", \"TROPONINI\" in the last 72 hours.    Microbiology:    Blood culture #1:   Lab Results   Component Value Date/Time    BC  11/25/2024 10:02 AM     No growth 24 hours. No growth 48 hours. No growth at 5 days       Blood culture #2:No results found for: \"BLOODCULT2\"    Organism:  Lab Results   Component Value Date/Time    ORG Candida species,not albicans 11/25/2024 10:30 AM         Lab Results   Component Value Date/Time    LABGRAM  11/25/2024 10:30 AM     Few segmented neutrophils observed. Rare epithelial cells observed. Few gram negative bacilli. Rare budding yeast.       MRSA culture only:No results found for: \"MRSAC\"    Urine culture:   Lab Results   Component Value Date/Time    LABURIN No growth-preliminary 11/20/2024 10:08 PM    LABURIN [place] count: <10,000 CFU/mL 11/20/2024 10:08 PM       Respiratory culture: No results found for: \"CULTRESP\"    Aerobic and Anaerobic :  No results found for: \"LABAERO\"  No results found for: \"LABANAE\"    Urinalysis:      Lab Results   Component Value Date/Time    NITRU NEGATIVE 11/25/2024 08:40 AM    WBCUA >100W/CLUMPS 11/25/2024 08:40 AM    BACTERIA FEW 11/25/2024 08:40 AM    RBCUA > 200 11/25/2024 08:40 AM    BLOODU LARGE 11/25/2024 08:40 AM    GLUCOSEU 250 11/25/2024 08:40 AM       Radiology:  FL MODIFIED BARIUM SWALLOW W VIDEO   Final Result   1. Laryngeal penetration of thin and mildly thick barium without evidence of   aspiration.   2. Additional recommendations from the speech therapist will follow.            **This report has been created using voice recognition software. It may contain   minor errors which are inherent in voice recognition technology.**            Electronically signed by Dr. Jamir Stubbs      XR CHEST PORTABLE   Final Result      Left lung base opacities, increased. The differential diagnosis includes    small pleural effusion with atelectasis and pneumonia.      The remainder of the examination is unchanged.      This  effusion left side. Tiny effusion right side.   3. Normal appearance of chest has worsened since prior.            **This report has been created using voice recognition software.  It may contain   minor errors which are inherent in voice recognition technology.**      Electronically signed by Dr. Arjun Aquino      US RENAL COMPLETE   Final Result      Probable right renal cysts.            **This report has been created using voice recognition software. It may contain   minor errors which are inherent in voice recognition technology.**         Electronically signed by Dr. Jamir Stubbs      US RENAL COMPLETE   Final Result   Impression:   Left renal stone.   Mild left hydronephrosis.   Right kidney unremarkable aside from two cysts.      This document has been electronically signed by: Pebbles Mathis MD on    11/21/2024 06:10 AM      CT COMPARISON OF OUTSIDE FILMS   Final Result      XR CHEST PORTABLE   Final Result   Impression:   Findings as above.      This document has been electronically signed by: Veto Colmenares MD on    11/20/2024 09:54 PM        US RENAL COMPLETE    Result Date: 11/22/2024  PROCEDURE: US RENAL COMPLETE CLINICAL INFORMATION: Indication provided by the ordering physician as \"s/p stent, drop in UOP.\" TECHNIQUE: Ultrasound of the kidneys and urinary bladder was performed. Grayscale and color images were obtained. COMPARISON: Renal ultrasound 11/21/2024 FINDINGS: The right kidney measures 12.5 x 8.0 x 5.0 cm and the left kidney measures 11.6 x 6.2 x 6.2 cm. Renal cortical thickness is normal. An anechoic structure at the right mid kidney measures 5.2 cm. An anechoic structure at the right lower kidney measures 3.2 cm. There is no hydronephrosis or renal calculi. The urinary bladder is not visualized by the sonographer.     Probable right renal cysts. **This report has been created using voice recognition software. It may contain minor errors which are inherent in voice recognition

## 2024-12-05 NOTE — BRIEF OP NOTE
[facility]  Sedation/Analgesia Post Sedation Record        Pt Name: Priya Lema  MRN: [id number]  YOB: 1965  Procedure Performed By: Nando Nolan MD MD, RADHA, GURDEEP, JAZMYNE  Primary Care Physician: Parmjit Tesfaye MD    POST-PROCEDURE    Sedation/Anesthesia:  Local Anesthesia and IV Conscious Sedation with continuous O2 monitoring    Estimated Blood Loss: 10 cc     Specimens Removed:  [x]None []Other:      Disposition of Specimen:  []Pathology []Other        Complications:   [x]None Immediate []Other:       Procedure performed: Left heart cath    Post Procedure Diagnosis/Findings:  Non-obstructive Coronary Artery Disease        Recommendations:    Medical treatment  Routine post-cath care.               Nando Nolan MD MD, FACTONO, GURDEEP, JAZMYNE  Electronically signed 12/5/2024 at 10:20 AM

## 2024-12-05 NOTE — PLAN OF CARE
Problem: Respiratory - Adult  Goal: Achieves optimal ventilation and oxygenation  12/4/2024 2114 by Luis Moore RCP  Outcome: Progressing     Problem: Respiratory - Adult  Goal: Clear lung sounds  12/4/2024 2114 by Luis Moore RCP  Outcome: Progressing   Patient lung sounds are considered normal for their current lung condition. No signs of distress noted. Current treatment regimen appropriate

## 2024-12-05 NOTE — PROGRESS NOTES
Cardiology Progress Note      Patient:  Priya Lema  YOB: 1965  MRN: [id number]   Acct: [id number]  Admit Date:  11/20/2024  Primary Cardiologist: mando Zapata   Seen by Dr. Haque     Per prior cardiology consult note-  REASON FOR CONSULT:   New drop in EF, ? Ischemic work up, elevated troponin, new atrial fibrillation       CHIEF COMPLAINT:    Flank pain     HISTORY OF PRESENT ILLNESS:    Priya Lema is a 59 year old female patient with past medical history that includes hypertension, COPD, tobacco abuse, obesity. She has h/o chronic respiratory failure and I s on home oxygen. Her family history is positive for CHF in her mother. Patient is intubated and is unable to provide history. Her family at bedside states that she was referred to a cardiologist last year for swelling in her legs. Echocardiogram on 12/2023 revealed an EF of 50-55%.The patient was admitted to the hospital on 11/20/2024. She had ;eft flank pain and was found to have RAINE. Patient developed worsening hypoxia and is now s/p intubation, being treated for COPD exacerbation. The patient is also being treated for UTI, Pyelonephritis, kidney stones and sepsis. Gram negative bacillin were noted on urine culture. She is followed by Urology and is now s/p left ureteral stent on 11/21/2024. She was noted to have new drop in EF on Echocardiogram with new onset atrial fibrillation and elevated troponin, cardiology consult was requested for further management and possible ischemic evaluation. Echocardiogram on 11/21/2024 revealed an EF of 30-35%.        Subjective (Events in last 24 hours):   Pt in bed - droggy    She can nod head yes and no to answer questions     Noted hematuria in montague bag - cherry red     Pt nods head no to chest pain or SOB - barely can talk at present     Tele SR no ectopy   BP stable       12/2/24  Pt awake in bed   Answers questions appropriately     No known CAD - use 2L NC at home   No chest pains  AM    CREATININE 0.8 12/05/2024 06:50 AM    LABGLOM 84 12/05/2024 06:50 AM    GLUCOSE 120 12/05/2024 06:50 AM    CALCIUM 8.1 12/05/2024 06:50 AM       Hepatic Function Panel:    Lab Results   Component Value Date/Time    ALKPHOS 61 11/30/2024 03:30 AM    ALT 36 11/30/2024 03:30 AM    AST 20 11/30/2024 03:30 AM    BILITOT 0.7 11/30/2024 03:30 AM    BILIDIR 0.3 11/20/2024 05:51 PM       Magnesium:    Lab Results   Component Value Date/Time    MG 1.7 12/05/2024 06:50 AM       PT/INR:    Lab Results   Component Value Date/Time    INR 1.15 12/05/2024 06:50 AM       HgBA1c:    Lab Results   Component Value Date/Time    LABA1C 7.9 11/23/2024 04:30 AM       FLP:    Lab Results   Component Value Date/Time    TRIG 189 12/05/2024 06:50 AM    HDL 30 12/05/2024 06:50 AM       TSH:    Lab Results   Component Value Date/Time    TSH 1.960 11/20/2024 05:51 PM         Assessment:    Sp cardiac cath 12/5/2024  Nonobstructive CAD     New onset AFB -- remains in SR   [id number] -4   on IV heparin   Was on iv amiodarone but not now  just on BB    Acute systolic chf   - diuresing well - compensated     Elevated trop - demand - no ACS  50 / 52 / 125 / 121     ASP PNA    RAINE-- > resolved     Anemia- needs workup  - GI seen - ok for DAPT   - chronic disease anemia     Acute hypoxic resp failure - intubated - now extubated   - on 2 L NC    COPD exacerbation = resolved   - on 2L NC at home     UTI (E Coli) / pyelonephritis / kidney stone sepsis    -s/p left ureteral stent on 11/21/2024   -dark maroon urine       New CDMP EF 30-35% (was 50-55%)  - Likely infectious related but with age and smoking hx can't exclude CAD - needs cath     HTN - uncontrolled   - resolved     HLP  LDL 35    DM II  -A1c 7.9    COPD / CARROLL   Tobacco abuse - current smoker       Plan:  Cath nonobstructive   OAC for AFB   GDMT for low EF - limited echo for LV evaluation   Follow        CHF guidelines:  BB: yes  ACE / ARB/ entresto: start after limited echo evaluation

## 2024-12-05 NOTE — PROGRESS NOTES
[facility]  INPATIENT SPEECH THERAPY  STRZ RENAL TELEMETRY 6K  DISCHARGE NOTE    Discharge Recommendations: SNF    SLP Individual Minutes  Time In: 1147  Time Out: 1155  Minutes: 8  Timed Code Treatment Minutes: 0 Minutes       Date: 2024  Patient Name: Priya Lema      CSN: [id number]   : 1965  (59 y.o.)  Gender: female   Referring Physician:  Rupesh Gallagher DO   Diagnosis: Pyelonephritis  Precautions: fall precautions  Current Diet: Regular diet and thin liquids  Respiratory Status: Room Air  Swallowing Strategies: Full Upright Position, Small Bite/Sip, Limit Distractions, and Monitor for Fatigue   Date of Last MBS/FEES:  2024    Pain:  No pain reported.    Subjective:  YONNY Sanchez approved session. Patient laying in bed upon ST arrival. Agreeable to skilled ST services. No family present. Pleasant and cooperative throughout.     Short-Term Goals:  SHORT TERM GOAL #1:  Goal 1: Patient will consume soft and bite sized diet and thin liquids (advanced textures as indicated) with no overt s/s of aspiration ro pulmonary decline to maintain adequate nutrition and hydration measures GOAL MET  INTERVENTIONS: Patient consumed advanced PO trials of brown cracker dipped in peanut butter this date. Good bolus control/manipulation and timely rotary mastication with effective textural breakdown. No oral residue noted. No overt signs/symptoms of aspiration across PO trials. Of course, pharyngeal dysfunction and totality of airway invasion events cannot be discerned at bedside alone. MBS completed on  with results of laryngeal penetration with thin and mildly thick liquids and recommendations for soft and bite size diet.     Recommend patient continue regular diet and thin liquids. No further skilled ST services warranted for dysphagia management. Should further concerns arise, please re-consult.    SHORT TERM GOAL #2:  Goal 2: Patient will complete pharyngeal strengthening exercises x10

## 2024-12-05 NOTE — PLAN OF CARE
Problem: Discharge Planning  Goal: Discharge to home or other facility with appropriate resources  Outcome: Progressing  Flowsheets (Taken 12/5/2024 0223)  Discharge to home or other facility with appropriate resources:   Refer to discharge planning if patient needs post-hospital services based on physician order or complex needs related to functional status, cognitive ability or social support system   Identify discharge learning needs (meds, wound care, etc)   Identify barriers to discharge with patient and caregiver  Note: Pt to be discharge home       Problem: Pain  Goal: Verbalizes/displays adequate comfort level or baseline comfort level  Outcome: Progressing  Flowsheets (Taken 12/5/2024 0223)  Verbalizes/displays adequate comfort level or baseline comfort level:   Administer analgesics based on type and severity of pain and evaluate response   Consider cultural and social influences on pain and pain management   Encourage patient to monitor pain and request assistance     Problem: Neurosensory - Adult  Goal: Achieves stable or improved neurological status  Outcome: Progressing  Flowsheets (Taken 12/5/2024 0223)  Achieves stable or improved neurological status:   Initiate measures to prevent increased intracranial pressure   Assess for and report changes in neurological status   Monitor temperature, glucose, and sodium. Initiate appropriate interventions as ordered   Maintain blood pressure and fluid volume within ordered parameters to optimize cerebral perfusion and minimize risk of hemorrhage  Goal: Achieves maximal functionality and self care  Outcome: Progressing  Flowsheets (Taken 12/5/2024 0223)  Achieves maximal functionality and self care:   Encourage visually impaired, hearing impaired and aphasic patients to use assistive/communication devices   Monitor swallowing and airway patency with patient fatigue and changes in neurological status     Problem: Respiratory - Adult  Goal: Achieves optimal

## 2024-12-05 NOTE — PROCEDURES
PROCEDURE NOTE  Date: 12/5/2024   Name: Priya Lema  YOB: 1965    Procedures    12 lead EKG completed. Results handed to Camelia BLANCAS.

## 2024-12-05 NOTE — RT PROTOCOL NOTE
RT Inhaler-Nebulizer Bronchodilator Protocol Note    There is a bronchodilator order in the chart from a provider indicating to follow the RT Bronchodilator Protocol and there is an “Initiate RT Inhaler-Nebulizer Bronchodilator Protocol” order as well (see protocol at bottom of note).    CXR Findings:  No results found.    The findings from the last RT Protocol Assessment were as follows:   History Pulmonary Disease: Chronic pulmonary disease  Respiratory Pattern: Regular pattern and RR 12-20 bpm  Breath Sounds: Slightly diminished and/or crackles  Cough: Strong, productive  Indication for Bronchodilator Therapy: Decreased or absent breath sounds, Wheezing associated with pulm disorder  Bronchodilator Assessment Score: 5    Aerosolized bronchodilator medication orders have been revised according to the RT Inhaler-Nebulizer Bronchodilator Protocol below.    Respiratory Therapist to perform RT Therapy Protocol Assessment initially then follow the protocol.  Repeat RT Therapy Protocol Assessment PRN for score 0-3 or on second treatment, BID, and PRN for scores above 3.    No Indications - adjust the frequency to every 6 hours PRN wheezing or bronchospasm, if no treatments needed after 48 hours then discontinue using Per Protocol order mode.     If indication present, adjust the RT bronchodilator orders based on the Bronchodilator Assessment Score as indicated below.  Use Inhaler orders unless patient has one or more of the following: on home nebulizer, not able to hold breath for 10 seconds, is not alert and oriented, cannot activate and use MDI correctly, or respiratory rate 25 breaths per minute or more, then use the equivalent nebulizer order(s) with same Frequency and PRN reasons based on the score.  If a patient is on this medication at home then do not decrease Frequency below that used at home.    0-3 - enter or revise RT bronchodilator order(s) to equivalent RT Bronchodilator order with Frequency of every 4

## 2024-12-05 NOTE — CARE COORDINATION
12/5/24, 2:25 PM EST    DISCHARGE PLANNING EVALUATION       SW spoke with Admissions with [facility] and they are able to accept Patient at discharge and do not need a LOC as Patient is a pre-cert with [facility].  They will accept tomorrow and do not need the pre-cert approved prior to accepting.

## 2024-12-06 VITALS
SYSTOLIC BLOOD PRESSURE: 153 MMHG | HEIGHT: 59 IN | TEMPERATURE: 98.2 F | DIASTOLIC BLOOD PRESSURE: 100 MMHG | BODY MASS INDEX: 41.47 KG/M2 | HEART RATE: 72 BPM | WEIGHT: 205.69 LBS | OXYGEN SATURATION: 99 % | RESPIRATION RATE: 18 BRPM

## 2024-12-06 LAB
ANION GAP SERPL CALC-SCNC: 10 MEQ/L (ref 8–16)
BUN SERPL-MCNC: 11 MG/DL (ref 7–22)
CALCIUM SERPL-MCNC: 7.9 MG/DL (ref 8.5–10.5)
CHLORIDE SERPL-SCNC: 106 MEQ/L (ref 98–111)
CO2 SERPL-SCNC: 28 MEQ/L (ref 23–33)
CREAT SERPL-MCNC: 0.8 MG/DL (ref 0.4–1.2)
DEPRECATED RDW RBC AUTO: 58.1 FL (ref 35–45)
ERYTHROCYTE [DISTWIDTH] IN BLOOD BY AUTOMATED COUNT: 20.2 % (ref 11.5–14.5)
GFR SERPL CREATININE-BSD FRML MDRD: 84 ML/MIN/1.73M2
GLUCOSE BLD STRIP.AUTO-MCNC: 223 MG/DL (ref 70–108)
GLUCOSE SERPL-MCNC: 125 MG/DL (ref 70–108)
HCT VFR BLD AUTO: 25.3 % (ref 37–47)
HGB BLD-MCNC: 7.7 GM/DL (ref 12–16)
MAGNESIUM SERPL-MCNC: 1.5 MG/DL (ref 1.6–2.4)
MCH RBC QN AUTO: 25.4 PG (ref 26–33)
MCHC RBC AUTO-ENTMCNC: 30.4 GM/DL (ref 32.2–35.5)
MCV RBC AUTO: 83.5 FL (ref 81–99)
PLATELET # BLD AUTO: 204 THOU/MM3 (ref 130–400)
PMV BLD AUTO: 10.7 FL (ref 9.4–12.4)
POTASSIUM SERPL-SCNC: 4.2 MEQ/L (ref 3.5–5.2)
RBC # BLD AUTO: 3.03 MILL/MM3 (ref 4.2–5.4)
SODIUM SERPL-SCNC: 144 MEQ/L (ref 135–145)
WBC # BLD AUTO: 7.3 THOU/MM3 (ref 4.8–10.8)

## 2024-12-06 PROCEDURE — 6360000002 HC RX W HCPCS: Performed by: INTERNAL MEDICINE

## 2024-12-06 PROCEDURE — 99232 SBSQ HOSP IP/OBS MODERATE 35: CPT | Performed by: INTERNAL MEDICINE

## 2024-12-06 PROCEDURE — 36415 COLL VENOUS BLD VENIPUNCTURE: CPT

## 2024-12-06 PROCEDURE — 80048 BASIC METABOLIC PNL TOTAL CA: CPT

## 2024-12-06 PROCEDURE — 85027 COMPLETE CBC AUTOMATED: CPT

## 2024-12-06 PROCEDURE — 83735 ASSAY OF MAGNESIUM: CPT

## 2024-12-06 PROCEDURE — 6370000000 HC RX 637 (ALT 250 FOR IP): Performed by: INTERNAL MEDICINE

## 2024-12-06 PROCEDURE — 82948 REAGENT STRIP/BLOOD GLUCOSE: CPT

## 2024-12-06 PROCEDURE — 6370000000 HC RX 637 (ALT 250 FOR IP): Performed by: NURSE PRACTITIONER

## 2024-12-06 PROCEDURE — 2700000000 HC OXYGEN THERAPY PER DAY

## 2024-12-06 PROCEDURE — 99239 HOSP IP/OBS DSCHRG MGMT >30: CPT | Performed by: INTERNAL MEDICINE

## 2024-12-06 PROCEDURE — 94640 AIRWAY INHALATION TREATMENT: CPT

## 2024-12-06 PROCEDURE — 2580000003 HC RX 258: Performed by: PHYSICIAN ASSISTANT

## 2024-12-06 PROCEDURE — 94761 N-INVAS EAR/PLS OXIMETRY MLT: CPT

## 2024-12-06 PROCEDURE — 6360000002 HC RX W HCPCS

## 2024-12-06 RX ORDER — BUDESONIDE 0.25 MG/2ML
250 INHALANT ORAL
Qty: 60 EACH | Refills: 3 | Status: SHIPPED | OUTPATIENT
Start: 2024-12-06

## 2024-12-06 RX ORDER — INSULIN GLARGINE 100 [IU]/ML
25 INJECTION, SOLUTION SUBCUTANEOUS DAILY
Qty: 10 ML | Refills: 3 | Status: SHIPPED | OUTPATIENT
Start: 2024-12-06

## 2024-12-06 RX ORDER — INSULIN LISPRO 100 [IU]/ML
0-8 INJECTION, SOLUTION INTRAVENOUS; SUBCUTANEOUS
Qty: 10 ML | Refills: 0 | Status: SHIPPED | OUTPATIENT
Start: 2024-12-06

## 2024-12-06 RX ORDER — METOPROLOL SUCCINATE 25 MG/1
25 TABLET, EXTENDED RELEASE ORAL DAILY
Qty: 30 TABLET | Refills: 3 | Status: SHIPPED | OUTPATIENT
Start: 2024-12-06

## 2024-12-06 RX ORDER — ALBUTEROL SULFATE 90 UG/1
2 INHALANT RESPIRATORY (INHALATION) PRN
Qty: 18 G | Refills: 3 | Status: SHIPPED | OUTPATIENT
Start: 2024-12-06

## 2024-12-06 RX ORDER — LISINOPRIL 40 MG/1
40 TABLET ORAL DAILY
Qty: 30 TABLET | Refills: 3 | Status: SHIPPED | OUTPATIENT
Start: 2024-12-06

## 2024-12-06 RX ORDER — HYDROXYZINE HYDROCHLORIDE 10 MG/1
10 TABLET, FILM COATED ORAL 3 TIMES DAILY PRN
Qty: 30 TABLET | Refills: 0 | Status: SHIPPED | OUTPATIENT
Start: 2024-12-06 | End: 2024-12-16

## 2024-12-06 RX ORDER — ATORVASTATIN CALCIUM 40 MG/1
40 TABLET, FILM COATED ORAL NIGHTLY
Qty: 30 TABLET | Refills: 3 | Status: SHIPPED | OUTPATIENT
Start: 2024-12-06

## 2024-12-06 RX ORDER — SPIRONOLACTONE 25 MG/1
25 TABLET ORAL DAILY
Qty: 30 TABLET | Refills: 3 | Status: SHIPPED | OUTPATIENT
Start: 2024-12-06

## 2024-12-06 RX ADMIN — INSULIN GLARGINE 25 UNITS: 100 INJECTION, SOLUTION SUBCUTANEOUS at 08:25

## 2024-12-06 RX ADMIN — INSULIN LISPRO 2 UNITS: 100 INJECTION, SOLUTION INTRAVENOUS; SUBCUTANEOUS at 11:52

## 2024-12-06 RX ADMIN — DOCUSATE SODIUM 100 MG: 100 CAPSULE, LIQUID FILLED ORAL at 08:26

## 2024-12-06 RX ADMIN — SODIUM CHLORIDE, PRESERVATIVE FREE 10 ML: 5 INJECTION INTRAVENOUS at 08:26

## 2024-12-06 RX ADMIN — TIOTROPIUM BROMIDE AND OLODATEROL 2 PUFF: 3.124; 2.736 SPRAY, METERED RESPIRATORY (INHALATION) at 08:09

## 2024-12-06 RX ADMIN — BUDESONIDE 250 MCG: 0.25 INHALANT RESPIRATORY (INHALATION) at 08:06

## 2024-12-06 RX ADMIN — ALBUTEROL SULFATE 2.5 MG: 2.5 SOLUTION RESPIRATORY (INHALATION) at 08:06

## 2024-12-06 RX ADMIN — METOPROLOL SUCCINATE 25 MG: 25 TABLET, FILM COATED, EXTENDED RELEASE ORAL at 08:26

## 2024-12-06 RX ADMIN — APIXABAN 5 MG: 5 TABLET, FILM COATED ORAL at 08:26

## 2024-12-06 NOTE — DISCHARGE SUMMARY
Discharge Summary    Date: 12/6/2024  Patient Name: Priya Lema    YOB: 1965     Age: 59 y.o.    Admit Date: 11/20/2024  Discharge Date: 12/6/2024  Discharge Condition: Good    Admission Diagnosis  Pyelonephritis [N12];Sepsis ([facility]) [A41.9]      Discharge Diagnosis  Principal Problem:    Pyelonephritis  Active Problems:    S/P ureteral stent placement    Hematuria    Acute respiratory failure    Pneumonia due to infectious organism    History of COPD    Dilated cardiomyopathy (HCC)    Tobacco abuse    Anemia    Sepsis (HCC)    Acute on chronic respiratory failure with hypoxia and hypercapnia    RAINE (acute kidney injury) (HCC)    Metabolic acidosis    Elevated troponin    Type 2 diabetes mellitus with hyperglycemia (HCC)    COPD exacerbation (HCC)    Septic shock (HCC)    Acute HFrEF (heart failure with reduced ejection fraction) ([facility])    PAF (paroxysmal atrial fibrillation) ([facility])    Ureteral calculus, left    Gross hematuria    Diabetic nephropathy with proteinuria ([facility])    Primary hypertension    Preoperative clearance  Resolved Problems:    * No resolved hospital problems. *      Hospital Stay  Narrative of Hospital Course:  59-year-old female with past medical history of hypertension, hyperlipidemia, COPD (on 2 L O2 at baseline) presented to [facility] from outside facility secondary to sepsis with left-sided pyelonephritis due to left-sided nephrolithiasis.  Patient presented to outside facility with left back pain/flank pain.  Denied any urinary symptoms.  CT A/P reported nephrolithiasis.  Patient was transferred to [facility] for urological evaluation.  Patient was admitted to the floor.  Overnight rapid response was called on 11/20/2024 secondary to decreased mentation, sinus tachycardia in the 160s, and concerns for airway protection.  At bedside patient given 1 L fluid boluses.  Patient was started on nasal cannula and switched to nonrebreather mask, saturation did not improve and patient was

## 2024-12-06 NOTE — PLAN OF CARE
Problem: Discharge Planning  Goal: Discharge to home or other facility with appropriate resources  12/5/2024 2350 by Nimco Yang RN  Outcome: Progressing     Problem: Pain  Goal: Verbalizes/displays adequate comfort level or baseline comfort level  12/5/2024 2350 by Nimco Yang RN  Outcome: Progressing     Problem: Neurosensory - Adult  Goal: Achieves stable or improved neurological status  12/5/2024 2350 by Nimco Yang RN  Outcome: Progressing     Problem: Neurosensory - Adult  Goal: Achieves maximal functionality and self care  12/5/2024 2350 by Nimco Yang RN  Outcome: Progressing     Problem: Respiratory - Adult  Goal: Achieves optimal ventilation and oxygenation  12/5/2024 2350 by Nimco Yang RN  Outcome: Progressing     Problem: Respiratory - Adult  Goal: Clear lung sounds  12/5/2024 2350 by Nimco Yang RN  Outcome: Progressing     Problem: Cardiovascular - Adult  Goal: Maintains optimal cardiac output and hemodynamic stability  12/5/2024 2350 by Nimco Yang RN  Outcome: Progressing     Problem: Skin/Tissue Integrity - Adult  Goal: Skin integrity remains intact  12/5/2024 2350 by Nimco Yang RN  Outcome: Progressing     Problem: Skin/Tissue Integrity - Adult  Goal: Incisions, wounds, or drain sites healing without S/S of infection  12/5/2024 2350 by Nimco Yang RN  Outcome: Progressing     Problem: Musculoskeletal - Adult  Goal: Return mobility to safest level of function  12/5/2024 2350 by Nimco Yang RN  Outcome: Progressing     Problem: Musculoskeletal - Adult  Goal: Return ADL status to a safe level of function  12/5/2024 2350 by Nimco Yang RN  Outcome: Progressing

## 2024-12-06 NOTE — PROGRESS NOTES
[facility]   Nephrology progress note  12/6/2024, 8:18 AM      Pt Name:    Priya Lema  MRN:     [id number]     YOB: 1965  Admit Date:    11/20/2024  4:47 PM    Chief Complaint: Nephrology following for cardiac catherization clearance    Subjective:  Patient was seen and examined this morning  No chest pain or shortness of breath  Cath nonobstructive  Hematuria resolved  Feels okay    Objective:  24HR INTAKE/OUTPUT:    Intake/Output Summary (Last 24 hours) at 12/6/2024 0818  Last data filed at 12/6/2024 0512  Gross per 24 hour   Intake --   Output 1805 ml   Net -1805 ml         I/O last 3 completed shifts:  In: 350 [Other:350]  Out: 2355 [Urine:2350; Blood:5]  No intake/output data recorded.   Admission weight: 95.6 kg (210 lb 12.2 oz)  Wt Readings from Last 3 Encounters:   12/06/24 93.3 kg (205 lb 11 oz)        Vitals :   Vitals:    12/05/24 1949 12/06/24 0059 12/06/24 0512 12/06/24 0808   BP: (!) 156/66 (!) 154/62 (!) 169/65    Pulse: 72 74 68    Resp: 18 18 18    Temp: 98.8 °F (37.1 °C) 98.7 °F (37.1 °C) 97.7 °F (36.5 °C)    TempSrc: Oral Oral Oral    SpO2: 100% 100% 100% 99%   Weight:   93.3 kg (205 lb 11 oz)    Height:           Physical examination  General Appearance: alert and cooperative with exam, appears comfortable, no distress  Mouth/Throat: Oral mucosa moist  Neck: No JVD  Lungs: Air entry B/L, no rales, no use of accessory muscles  Heart:  S1, S2 heard  GI: soft, non-tender, no guarding  Extremities: +1 bilateral LE edema    Medications:  Infusion:    sodium chloride      sodium chloride      sodium chloride Stopped (11/24/24 0413)    dextrose       Meds:    apixaban  5 mg Oral BID    albuterol  2.5 mg Nebulization BID RT    [Held by provider] bumetanide  1 mg Oral BID    docusate sodium  100 mg Oral Daily    metoprolol succinate  25 mg Oral Daily    [Held by provider] spironolactone  25 mg Oral Daily    insulin glargine  25 Units SubCUTAneous Daily    insulin  lispro  0-8 Units SubCUTAneous 4x Daily AC & HS    atorvastatin  40 mg Per G Tube Nightly    senna  8.8 mg Per G Tube Nightly    polyethylene glycol  17 g Oral Daily    budesonide  250 mcg Nebulization BID RT    sodium chloride flush  5-40 mL IntraVENous 2 times per day    [Held by provider] lisinopril  40 mg Oral Daily    tiotropium-olodaterol  2 puff Inhalation Daily     Meds prn: nitroGLYCERIN, sodium chloride, acetaminophen, atropine, sodium chloride, potassium chloride **OR** potassium chloride, potassium chloride **OR** potassium alternative oral replacement **OR** potassium chloride, hydrALAZINE, hydrOXYzine HCl, albuterol sulfate HFA, sodium chloride flush, sodium chloride, ondansetron **OR** ondansetron, polyethylene glycol, glucose, dextrose bolus **OR** dextrose bolus, glucagon (rDNA), dextrose     Lab Data :  CBC:   Recent Labs     12/04/24  0620 12/04/24  1850 12/05/24  0650 12/06/24  0603   WBC 11.2*  --  9.7 7.3   HGB 7.1* 7.7* 8.3* 7.7*   HCT 23.9* 24.6* 26.4* 25.3*     --  213 204     CMP:  Recent Labs     12/04/24  0620 12/05/24  0650 12/06/24  0603    140 144   K 3.6 4.0 4.2    101 106   CO2 31 29 28   BUN 13 11 11   CREATININE 0.8 0.8 0.8   GLUCOSE 109* 120* 125*   CALCIUM 7.7* 8.1* 7.9*   MG 1.7 1.7 1.5*     Hepatic: No results for input(s): \"LABALBU\", \"AST\", \"ALT\", \"BILITOT\", \"ALKPHOS\" in the last 72 hours.    Invalid input(s): \"ALB\"      Assessment and Plan:  Renal - RAINE, resolved.  Baseline creatinine 0.7,  November 2023. Initial creatinine 2.9 this hospitalization.  Etiology of ARINE multifactorial: Sepsis, ATN, obstructive uropathy from nephrolithiasis  Possibly CKD 3A/2, needs outpatient nephrology follow-up  Patient unlikely to have CKD - no pathology on renal US, bland UA with minor granular casts  Okay to resume bumex, lisinopril on discharge  Remove montague before discharge  Electrolytes - hypernatremia resolved.   Mixed systolic/diastolic HFrEF, new onset

## 2024-12-06 NOTE — PROGRESS NOTES
AVS, last dose MAR, and YOEL faxed to [facility]. Report  called to Marycarmen BLANCAS at [facility]. Patient discharged to [facility] via [facility].

## 2024-12-06 NOTE — CARE COORDINATION
12/6/24, 8:27 AM EST    Patient goals/plan/ treatment preferences discussed by  and .  Patient goals/plan/ treatment preferences reviewed with patient/ family.  Patient/ family verbalize understanding of discharge plan and are in agreement with goal/plan/treatment preferences.  Understanding was demonstrated using the teach back method.  AVS provided by RN at time of discharge, which includes all necessary medical information pertaining to the patients current course of illness, treatment, post-discharge goals of care, and treatment preferences.     Services At/After Discharge: Skilled Nursing Facility (SNF)    Patient discharge today to [facility]. SW updated Nora with admissions and they are able to accept. Ambulette arranged for 2:15/2:30pm today. Blue envelope on chart and RN updated. AVS to be faxed when completed.

## 2024-12-06 NOTE — PROGRESS NOTES
Order received for PICC removal per Abbey Cisneros MD. Patient placed in bed. Transparent dressing removed. Skin accessed appears clean, dry and intact. Area cleaned with alcohol, sterile gauze and Vaseline gauze placed over site, PICC removed with tip intact, pressure held with sterile gauze for 3 minutes. New transparent dressing applied. Educated the patient on remaining in bed for 30 min, dressing stays on for 24 hours, signs and symptoms of infection and notify primary nurse if any blood or oozing. Primary RN notified.

## 2024-12-06 NOTE — PLAN OF CARE
Problem: Discharge Planning  Goal: Discharge to home or other facility with appropriate resources  12/6/2024 1332 by Manasa Mcmullen RN  Outcome: Adequate for Discharge  12/5/2024 2350 by Nimco Yang RN  Outcome: Progressing     Problem: Pain  Goal: Verbalizes/displays adequate comfort level or baseline comfort level  12/6/2024 1332 by Manasa Mcmullen RN  Outcome: Adequate for Discharge  12/5/2024 2350 by Nimco Yang RN  Outcome: Progressing     Problem: Neurosensory - Adult  Goal: Achieves stable or improved neurological status  12/6/2024 1332 by Manasa Mcmullen RN  Outcome: Adequate for Discharge  12/5/2024 2350 by Nimco Yang RN  Outcome: Progressing  Goal: Achieves maximal functionality and self care  12/6/2024 1332 by Manasa Mcmullen RN  Outcome: Adequate for Discharge  12/5/2024 2350 by Nimco Yang RN  Outcome: Progressing     Problem: Respiratory - Adult  Goal: Achieves optimal ventilation and oxygenation  12/6/2024 1332 by Manasa Mcmullen RN  Outcome: Adequate for Discharge  12/6/2024 0916 by Alesha Bryan RCP  Outcome: Progressing  12/5/2024 2350 by Nimco Yang RN  Outcome: Progressing  Goal: Clear lung sounds  12/6/2024 1332 by Manasa Mcmullen RN  Outcome: Adequate for Discharge  12/6/2024 0916 by Alesha Bryan RCP  Outcome: Progressing  12/5/2024 2350 by Nimco Yang RN  Outcome: Progressing     Problem: Cardiovascular - Adult  Goal: Maintains optimal cardiac output and hemodynamic stability  12/6/2024 1332 by Manasa Mcmullen RN  Outcome: Adequate for Discharge  12/5/2024 2350 by Nimco Yang RN  Outcome: Progressing  Goal: Absence of cardiac dysrhythmias or at baseline  12/6/2024 1332 by Manasa Mcmullen RN  Outcome: Adequate for Discharge  12/5/2024 2350 by Nimco Yang RN  Outcome: Progressing     Problem: Skin/Tissue Integrity - Adult  Goal: Skin integrity remains intact  12/6/2024 1332 by Manasa Mcmullen, RN  Outcome: Adequate for

## 2024-12-06 NOTE — PLAN OF CARE
Problem: Respiratory - Adult  Goal: Achieves optimal ventilation and oxygenation  12/5/2024 2150 by Joyce Rodriguez RCP  Outcome: Progressing     Problem: Respiratory - Adult  Goal: Clear lung sounds  12/5/2024 2150 by Joyce Rodriguez RCP  Outcome: Progressing   Patient mutually agreed on goals.

## 2024-12-11 DIAGNOSIS — N20.0 KIDNEY STONE: ICD-10-CM

## 2024-12-11 DIAGNOSIS — N20.0 CALCULUS OF RENAL PELVIS: Primary | ICD-10-CM

## 2024-12-13 ENCOUNTER — PREP FOR PROCEDURE (OUTPATIENT)
Dept: UROLOGY | Age: 59
End: 2024-12-13

## 2024-12-13 RX ORDER — SODIUM CHLORIDE 0.9 % (FLUSH) 0.9 %
5-40 SYRINGE (ML) INJECTION EVERY 12 HOURS SCHEDULED
Status: CANCELLED | OUTPATIENT
Start: 2024-12-13

## 2024-12-13 RX ORDER — SODIUM CHLORIDE 9 MG/ML
INJECTION, SOLUTION INTRAVENOUS PRN
Status: CANCELLED | OUTPATIENT
Start: 2024-12-13

## 2024-12-13 RX ORDER — SODIUM CHLORIDE 0.9 % (FLUSH) 0.9 %
5-40 SYRINGE (ML) INJECTION PRN
Status: CANCELLED | OUTPATIENT
Start: 2024-12-13

## 2024-12-17 RX ORDER — HYDROXYZINE HYDROCHLORIDE 10 MG/1
10 TABLET, FILM COATED ORAL EVERY 8 HOURS PRN
COMMUNITY

## 2024-12-17 NOTE — PROGRESS NOTES
Who will be transporting patient? Unsure possibly son  Who will be with patient? son  Is the patient oriented? Alert and oriented x3  Height: 59in             Weight: 195lb   Does patient have any assistive devices? Walker, oxygen  Is the patient weight bearing?yes  How many people are needed to move the patient? Stand by  Does the patient have a pacemaker or AICD? no  Have you received any pre-op testing orders and instructions from the surgeons's office? no  Please fax MAR-Medication record and Administration record with Diagnoses    Medication instructions given-per nurse patient takes lantus in am, instructed to hold lantus and lispro am of surgery, hold lisinopril am of surgery, take metoprolol am of surgery, hold aspirin 5 days if ok with cardiologist, hold eliquis 3 days if ok with cardiologist (Blaire states patient currently at cardiology clearance appointment), if patient usually uses inhalers in am, use am of surgery  Bring list of medications with dose and frequency.  Please fax medication list and send list of allergies  NPO after midnight  Bring insurance info and drivers license  Wear comfortable clean clothing  Do not bring jewelry or valuables  Shower night before and morning of surgery with a liquid antibacterial soap, no lotions, creams or powders after showers  Follow all instructions given by your physician   needed at discharge  Name of nurse you spoke with: Blaire

## 2024-12-17 NOTE — PROGRESS NOTES
Left message for Mercy at Dr Wilson's office regarding patient at [facility] [phone] and need testing/instructions sent to them.

## 2024-12-17 NOTE — PROGRESS NOTES
PAT call attempted, patient unavailable, left message to please call us back at your earliest convenience; [phone]

## 2024-12-26 NOTE — PROGRESS NOTES
I spoke with Priya's nurse at [facility]; they will fax urine specimen and lab results- thank you.

## 2024-12-26 NOTE — PROGRESS NOTES
CBC,BMP AND Urine results received from [facility]( phone [phone]) - urine positive for E coli. I notified the on-call service-Joyce, transferred to [facility]- results faxed to [phone]. Results will get scanned in then reviewed by PAs. Thank you.

## 2024-12-27 RX ORDER — SULFAMETHOXAZOLE AND TRIMETHOPRIM 800; 160 MG/1; MG/1
1 TABLET ORAL 2 TIMES DAILY
Qty: 14 TABLET | Refills: 0 | Status: SHIPPED | OUTPATIENT
Start: 2024-12-27 | End: 2025-01-03

## 2024-12-30 ENCOUNTER — ANESTHESIA (OUTPATIENT)
Dept: OPERATING ROOM | Age: 59
End: 2024-12-30
Payer: COMMERCIAL

## 2024-12-30 ENCOUNTER — ANESTHESIA EVENT (OUTPATIENT)
Dept: OPERATING ROOM | Age: 59
End: 2024-12-30
Payer: COMMERCIAL

## 2024-12-30 ENCOUNTER — HOSPITAL ENCOUNTER (OUTPATIENT)
Age: 59
Setting detail: OUTPATIENT SURGERY
Discharge: HOME | End: 2024-12-30
Attending: UROLOGY | Admitting: UROLOGY
Payer: COMMERCIAL

## 2024-12-30 VITALS
HEART RATE: 95 BPM | BODY MASS INDEX: 38.38 KG/M2 | TEMPERATURE: 97.6 F | SYSTOLIC BLOOD PRESSURE: 128 MMHG | WEIGHT: 190.4 LBS | OXYGEN SATURATION: 95 % | RESPIRATION RATE: 16 BRPM | HEIGHT: 59 IN | DIASTOLIC BLOOD PRESSURE: 65 MMHG

## 2024-12-30 DIAGNOSIS — N18.31 STAGE 3A CHRONIC KIDNEY DISEASE (HCC): Primary | ICD-10-CM

## 2024-12-30 LAB
GLUCOSE BLD STRIP.AUTO-MCNC: 118 MG/DL (ref 70–108)
INR PPP: 0.95 (ref 0.85–1.13)

## 2024-12-30 PROCEDURE — 3700000001 HC ADD 15 MINUTES (ANESTHESIA): Performed by: UROLOGY

## 2024-12-30 PROCEDURE — C2617 STENT, NON-COR, TEM W/O DEL: HCPCS | Performed by: UROLOGY

## 2024-12-30 PROCEDURE — 7100000011 HC PHASE II RECOVERY - ADDTL 15 MIN: Performed by: UROLOGY

## 2024-12-30 PROCEDURE — 6360000002 HC RX W HCPCS: Performed by: UROLOGY

## 2024-12-30 PROCEDURE — C1758 CATHETER, URETERAL: HCPCS | Performed by: UROLOGY

## 2024-12-30 PROCEDURE — 6360000002 HC RX W HCPCS: Performed by: ANESTHESIOLOGY

## 2024-12-30 PROCEDURE — 7100000010 HC PHASE II RECOVERY - FIRST 15 MIN: Performed by: UROLOGY

## 2024-12-30 PROCEDURE — 82948 REAGENT STRIP/BLOOD GLUCOSE: CPT

## 2024-12-30 PROCEDURE — 2500000003 HC RX 250 WO HCPCS: Performed by: UROLOGY

## 2024-12-30 PROCEDURE — C1747 HC ENDOSCOPE, SINGLE, URINARY TRACT: HCPCS | Performed by: UROLOGY

## 2024-12-30 PROCEDURE — 85610 PROTHROMBIN TIME: CPT

## 2024-12-30 PROCEDURE — 7100000001 HC PACU RECOVERY - ADDTL 15 MIN: Performed by: UROLOGY

## 2024-12-30 PROCEDURE — 2720000010 HC SURG SUPPLY STERILE: Performed by: UROLOGY

## 2024-12-30 PROCEDURE — 2709999900 HC NON-CHARGEABLE SUPPLY: Performed by: UROLOGY

## 2024-12-30 PROCEDURE — 36415 COLL VENOUS BLD VENIPUNCTURE: CPT

## 2024-12-30 PROCEDURE — 3600000013 HC SURGERY LEVEL 3 ADDTL 15MIN: Performed by: UROLOGY

## 2024-12-30 PROCEDURE — C1769 GUIDE WIRE: HCPCS | Performed by: UROLOGY

## 2024-12-30 PROCEDURE — 3700000000 HC ANESTHESIA ATTENDED CARE: Performed by: UROLOGY

## 2024-12-30 PROCEDURE — 3600000003 HC SURGERY LEVEL 3 BASE: Performed by: UROLOGY

## 2024-12-30 PROCEDURE — 2580000003 HC RX 258: Performed by: UROLOGY

## 2024-12-30 PROCEDURE — 7100000000 HC PACU RECOVERY - FIRST 15 MIN: Performed by: UROLOGY

## 2024-12-30 DEVICE — URETERAL STENT
Type: IMPLANTABLE DEVICE | Status: FUNCTIONAL
Brand: PERCUFLEX™ PLUS

## 2024-12-30 RX ORDER — LABETALOL HYDROCHLORIDE 5 MG/ML
10 INJECTION, SOLUTION INTRAVENOUS
Status: CANCELLED | OUTPATIENT
Start: 2024-12-30

## 2024-12-30 RX ORDER — KETOROLAC TROMETHAMINE 10 MG/1
10 TABLET, FILM COATED ORAL EVERY 6 HOURS PRN
Qty: 15 TABLET | Refills: 0 | Status: SHIPPED | OUTPATIENT
Start: 2024-12-30

## 2024-12-30 RX ORDER — SODIUM CHLORIDE 0.9 % (FLUSH) 0.9 %
5-40 SYRINGE (ML) INJECTION PRN
Status: DISCONTINUED | OUTPATIENT
Start: 2024-12-30 | End: 2024-12-30 | Stop reason: HOSPADM

## 2024-12-30 RX ORDER — FENTANYL CITRATE 50 UG/ML
INJECTION, SOLUTION INTRAMUSCULAR; INTRAVENOUS
Status: DISCONTINUED | OUTPATIENT
Start: 2024-12-30 | End: 2024-12-30 | Stop reason: SDUPTHER

## 2024-12-30 RX ORDER — SODIUM CHLORIDE 0.9 % (FLUSH) 0.9 %
5-40 SYRINGE (ML) INJECTION EVERY 12 HOURS SCHEDULED
Status: CANCELLED | OUTPATIENT
Start: 2024-12-30

## 2024-12-30 RX ORDER — PROPOFOL 10 MG/ML
INJECTION, EMULSION INTRAVENOUS
Status: DISCONTINUED | OUTPATIENT
Start: 2024-12-30 | End: 2024-12-30 | Stop reason: SDUPTHER

## 2024-12-30 RX ORDER — ONDANSETRON 2 MG/ML
INJECTION INTRAMUSCULAR; INTRAVENOUS
Status: DISCONTINUED | OUTPATIENT
Start: 2024-12-30 | End: 2024-12-30 | Stop reason: SDUPTHER

## 2024-12-30 RX ORDER — ONDANSETRON 2 MG/ML
4 INJECTION INTRAMUSCULAR; INTRAVENOUS
Status: CANCELLED | OUTPATIENT
Start: 2024-12-30 | End: 2024-12-31

## 2024-12-30 RX ORDER — PHENAZOPYRIDINE HYDROCHLORIDE 100 MG/1
100 TABLET, FILM COATED ORAL 3 TIMES DAILY PRN
Qty: 15 TABLET | Refills: 0 | Status: SHIPPED | OUTPATIENT
Start: 2024-12-30 | End: 2025-01-04

## 2024-12-30 RX ORDER — SODIUM CHLORIDE 0.9 % (FLUSH) 0.9 %
5-40 SYRINGE (ML) INJECTION EVERY 12 HOURS SCHEDULED
Status: DISCONTINUED | OUTPATIENT
Start: 2024-12-30 | End: 2024-12-30 | Stop reason: HOSPADM

## 2024-12-30 RX ORDER — SODIUM CHLORIDE 9 MG/ML
INJECTION, SOLUTION INTRAVENOUS PRN
Status: CANCELLED | OUTPATIENT
Start: 2024-12-30

## 2024-12-30 RX ORDER — DEXAMETHASONE SODIUM PHOSPHATE 10 MG/ML
INJECTION, EMULSION INTRAMUSCULAR; INTRAVENOUS
Status: DISCONTINUED | OUTPATIENT
Start: 2024-12-30 | End: 2024-12-30 | Stop reason: SDUPTHER

## 2024-12-30 RX ORDER — SODIUM CHLORIDE 9 MG/ML
INJECTION, SOLUTION INTRAVENOUS PRN
Status: DISCONTINUED | OUTPATIENT
Start: 2024-12-30 | End: 2024-12-30 | Stop reason: HOSPADM

## 2024-12-30 RX ORDER — SODIUM CHLORIDE 0.9 % (FLUSH) 0.9 %
5-40 SYRINGE (ML) INJECTION PRN
Status: CANCELLED | OUTPATIENT
Start: 2024-12-30

## 2024-12-30 RX ORDER — HYDRALAZINE HYDROCHLORIDE 20 MG/ML
10 INJECTION INTRAMUSCULAR; INTRAVENOUS
Status: CANCELLED | OUTPATIENT
Start: 2024-12-30

## 2024-12-30 RX ORDER — NALOXONE HYDROCHLORIDE 0.4 MG/ML
INJECTION, SOLUTION INTRAMUSCULAR; INTRAVENOUS; SUBCUTANEOUS PRN
Status: CANCELLED | OUTPATIENT
Start: 2024-12-30

## 2024-12-30 RX ORDER — DOXYCYCLINE HYCLATE 100 MG
100 TABLET ORAL 2 TIMES DAILY
Qty: 6 TABLET | Refills: 0 | Status: SHIPPED | OUTPATIENT
Start: 2024-12-30 | End: 2025-01-02

## 2024-12-30 RX ORDER — OXYBUTYNIN CHLORIDE 5 MG/1
5 TABLET, EXTENDED RELEASE ORAL DAILY
Qty: 14 TABLET | Refills: 0 | Status: SHIPPED | OUTPATIENT
Start: 2024-12-30 | End: 2025-01-13

## 2024-12-30 RX ADMIN — SODIUM CHLORIDE: 9 INJECTION, SOLUTION INTRAVENOUS at 12:45

## 2024-12-30 RX ADMIN — FENTANYL CITRATE 50 MCG: 50 INJECTION, SOLUTION INTRAMUSCULAR; INTRAVENOUS at 16:09

## 2024-12-30 RX ADMIN — PROPOFOL 120 MG: 10 INJECTION, EMULSION INTRAVENOUS at 16:09

## 2024-12-30 RX ADMIN — FENTANYL CITRATE 50 MCG: 50 INJECTION, SOLUTION INTRAMUSCULAR; INTRAVENOUS at 16:13

## 2024-12-30 RX ADMIN — DEXAMETHASONE SODIUM PHOSPHATE 4 MG: 10 INJECTION, EMULSION INTRAMUSCULAR; INTRAVENOUS at 16:13

## 2024-12-30 RX ADMIN — WATER 2000 MG: 1 INJECTION INTRAMUSCULAR; INTRAVENOUS; SUBCUTANEOUS at 16:04

## 2024-12-30 RX ADMIN — ONDANSETRON 4 MG: 2 INJECTION INTRAMUSCULAR; INTRAVENOUS at 16:13

## 2024-12-30 ASSESSMENT — COPD QUESTIONNAIRES: CAT_SEVERITY: SEVERE

## 2024-12-30 NOTE — ANESTHESIA PRE PROCEDURE
11/30/2024 03:30 AM    ALKPHOS 61 11/30/2024 03:30 AM    AST 20 11/30/2024 03:30 AM    ALT 36 11/30/2024 03:30 AM       POC Tests:   Recent Labs     12/30/24  1236   POCGLU 118*       Coags:   Lab Results   Component Value Date/Time    INR 0.95 12/30/2024 12:06 PM    APTT 75.8 12/05/2024 06:50 AM       HCG (If Applicable): No results found for: \"PREGTESTUR\", \"PREGSERUM\", \"HCG\", \"HCGQUANT\"     ABGs: No results found for: \"PHART\", \"PO2ART\", \"BOK4CRK\", \"JCJ7JRH\", \"BEART\", \"J5MXCQTA\"     Type & Screen (If Applicable):  Lab Results   Component Value Date    LABANTI NEG 12/02/2024       Drug/Infectious Status (If Applicable):  No results found for: \"HIV\", \"HEPCAB\"    COVID-19 Screening (If Applicable): No results found for: \"COVID19\"        Anesthesia Evaluation  Patient summary reviewed  Airway: Mallampati: I  TM distance: >3 FB   Neck ROM: full  Mouth opening: > = 3 FB  Intubated Dental:          Pulmonary:normal exam    (+)  COPD: severe,                                    ROS comment: Chronic supplemental oxygen   Cardiovascular:  Exercise tolerance: poor (<4 METS)  (+) hypertension:      ECG reviewed                        Neuro/Psych:               GI/Hepatic/Renal:   (+) renal disease: kidney stones          Endo/Other:    (+) Diabetes.                 Abdominal:             Vascular:          Other Findings:       Anesthesia Plan      general     ASA 4       Induction: intravenous and inhalational.    MIPS: Postoperative opioids intended and Prophylactic antiemetics administered.  Anesthetic plan and risks discussed with child/children.      Plan discussed with CRNA.                Kadeem Freitas MD   12/30/2024

## 2024-12-30 NOTE — ANESTHESIA POSTPROCEDURE EVALUATION
Department of Anesthesiology  Postprocedure Note    Patient: Priya Lema  MRN: [id number]  YOB: 1965  Date of evaluation: 12/30/2024    Procedure Summary       Date: 12/30/24 [facility]    Anesthesia Start: 1604 Anesthesia Stop: 1633    Procedure: CYSTOSCOPY, LEFT URETEROSCOPY, HOLIUM LASER LITOTRIPSY, BASKET RETRIEVAL OF STONE FRAGMENETS, LEFT  STENT EXCHANGE (Left) Diagnosis:       Kidney stone      (Kidney stone [N20.0])    Surgeons: Esteban Wilson Jr., MD Responsible Provider: Kadeem Freitas MD    Anesthesia Type: general ASA Status: 4            Anesthesia Type: No value filed.    Ronald Phase I: Ronald Score: 9    Ronald Phase II:      Anesthesia Post Evaluation    Patient location during evaluation: PACU  Patient participation: complete - patient participated  Level of consciousness: awake and alert  Airway patency: patent  Nausea & Vomiting: no vomiting and no nausea  Cardiovascular status: hemodynamically stable  Respiratory status: acceptable and nasal cannula  Hydration status: stable  Pain management: adequate    No notable events documented.

## 2024-12-30 NOTE — DISCHARGE INSTRUCTIONS
Discharge instructions: Ureteroscopy lithotripsy and stent placement (with string):  You may see blood in the urine after the procedure.  This should resolve over the next couple days.  Please stay hydrated.  You may see intermittent blood in the urine while the stent is in place.  This is expected.  You may experience flank pain, and/or frequency/urgency of urination while the stent is in place.  Please use medications prescribed to help with these symptoms.    Pt ok to discharge home in good condition  No heavy lifting, >10 lbs for today  Pt should avoid strenuous activity for today  Pt should walk moderately at home  Pt ok to shower   Pt may resume diet as tolerated  Pt should take Rx as directed  No driving while on narcotics  Please call attending physician or hospital  with questions  Call or Present to ED if fever (> 101F), intractable nausea vomiting or pain.  Rx in chart    Pt should follow up with Dr. Esteban Wilson Jr, MD, in 3 months with MARI and shanita, call to confirm appointment.    Pt should Pull stent in the morning of 1/3.  There may be some pain associated with the stent removal, which is usually self limiting.  We suggest using the pain medication prescribed for you and a nonsteroidal anti-inflammatory such as Ibuprofen, if you are able to take this medication, to control symptoms.  Take Ibuprofen as directed for 24 hrs after stent pull.  Please stay hydrated.  Please call with questions.

## 2024-12-30 NOTE — PROGRESS NOTES
Report called to [facility].   Pt has met discharge criteria and states she is ready for discharge to facility IV removed, gauze and tape applied. Dressed in own clothes and personal belongings gathered. Discharge instructions (with opioid medication education information) given to pt and family; pt and family verbalized understanding of discharge instructions, prescriptions and follow up appointments. Pt transported to discharge lobby by [facility] staff.

## 2024-12-30 NOTE — OP NOTE
FACILITY:  [place], [place]   Priya Lema  1965  [id number]    DATE: 12/30/24   SURGEON:  Dr. Esteban Wilson Jr, MD , MD  ASSISTANT: Dr. Esteban Wilson Jr, MD MD  PREOPERATIVE DIAGNOSIS: Left sided kidney stone  POSTOPERATIVE DIAGNOSIS: Left sided kidney stone  PROCEDURES PERFORMED:  1. Cystoscopy  2. Left sided ureteroscopy with holmium laser lithotripsy  3. Left sided stent exchange.  DRAINS: A Left sided  6x26 double J ureteral stent (with string)  SPECIMEN: none  ANESTHESIA: General  ESTIMATED BLOOD LOSS: None.   COMPLICATIONS: None.     INDICATIONS FOR PROCEDURE:  Priya Lema is a 59 y.o. female presents for Left sided calculus.     After the risks, benefits, alternatives, of the procedure were discussed with the patient, informed consent was obtained.  The patient elected to proceed.     DETAILS OF THE PROCEDURE:  The patient was brought back from the preoperative holding area to the operating suite, and was transferred to the operating table where the patient lay in supine position. EPC's were in place, connected to the machine and the machine was turned on before induction.  General endotracheal anesthesia was induced, and patient was prepped and draped in standard surgical fashion after being placed in dorsolithotomy position. A proper timeout was performed, preoperative antibiotics were given. We began by inserting a cystoscope with a 22 Divehi sheath and 30 degree lens into the patient's urethral meatus and advancing into the bladder without complication.  A pan cystoscopy was preformed and the bladder appeared unremarkable.  We then focused our attention on the Left ureteral orifice, removed the stent which we cannulated with our glidewire, advanced up to renal pelvis.  We then used a dual lumen catheter to place a second wire.  Once in good position, we advanced our flexible ureteroscope over the working wire to the renal pelvis under direct visualization.  We identified the

## 2024-12-30 NOTE — PROGRESS NOTES
1630- pt to pacu, oral airway removed upon arrival, resp easy and unlabored, VSS, pt appears in no acute distress, pt denies pain and nausea  1645- pt resting in bed with eyes opened, resp easy and unlabored, VSS, pt appears in no acute distress, pt denies pain  1700- pt meets criteria for discharge from pacu, pt transported to hospitals in stable condition

## 2024-12-30 NOTE — H&P
[facility]  Urology H&P Note     Patient:  Priya Lema  MRN: [id number]  YOB: 1965    ATTENDING: Esteban Wilson Jr, MD     CHIEF COMPLAINT:  kidney stone    HISTORY OF PRESENT ILLNESS:   The patient is a 59 y.o. female who presents with kidney stone    Patient's old records, notes and chart reviewed and summarized above.     Past Medical History:    Past Medical History:   Diagnosis Date    COPD (chronic obstructive pulmonary disease) (HCC)     Diabetes mellitus (HCC)     Hyperlipidemia     Hypertension     CARROLL (obstructive sleep apnea)        Past Surgical History:    Past Surgical History:   Procedure Laterality Date    CARDIAC PROCEDURE N/A 12/5/2024    Left heart cath / coronary angiography performed by Nando Nolan MD at [facility] CARDIAC CATH LAB    CYSTOSCOPY Left 11/21/2024    CYSTOSCOPY LEFT URETERAL STENT INSERTION performed by Zurdo Richardson MD at [facility] OR       Medications Prior to Admission:   Prior to Admission medications    Medication Sig Start Date End Date Taking? Authorizing Provider   sulfamethoxazole-trimethoprim (BACTRIM DS;SEPTRA DS) 800-160 MG per tablet Take 1 tablet by mouth 2 times daily for 7 days 12/27/24 1/3/25  Jw Leonard APRN - CNP   hydrOXYzine HCl (ATARAX) 10 MG tablet Take 1 tablet by mouth every 8 hours as needed for Itching    Provider, MD Yocasta   albuterol sulfate HFA (PROVENTIL;VENTOLIN;PROAIR) 108 (90 Base) MCG/ACT inhaler Inhale 2 puffs into the lungs as needed for Shortness of Breath 12/6/24   Abbey Cisneros MD   apixaban (ELIQUIS) 5 MG TABS tablet Take 1 tablet by mouth 2 times daily 12/6/24   Abbey Cisneros MD   atorvastatin (LIPITOR) 40 MG tablet 1 tablet by Per G Tube route nightly  Patient taking differently: Take 1 tablet by mouth nightly 12/6/24   Abbey Cisneros MD   budesonide (PULMICORT) 0.25 MG/2ML nebulizer suspension Take 2 mLs by nebulization in the morning and 2 mLs in the evening. 12/6/24   Abbey Cisneros MD   insulin

## 2024-12-30 NOTE — PROGRESS NOTES
Pt returned to [facility] room 18. Vitals and assessment as charted. 0.9 infusing, to count from PACU. Pt has crackers and water. Family at the bedside. Pt and family verbalized understanding of discharge criteria and call light use. Call light in reach.

## 2024-12-30 NOTE — PROGRESS NOTES
Patient admitted to [facility] room 18 with family at bedside. Bed in low position side rails up call light in reach. Patient denies questions at this time.     Sara [phone]

## 2024-12-31 DIAGNOSIS — N20.0 CALCULUS OF RENAL PELVIS: Primary | ICD-10-CM

## 2025-01-02 PROBLEM — Z01.818 PREOPERATIVE CLEARANCE: Status: RESOLVED | Noted: 2024-12-03 | Resolved: 2025-01-02

## 2025-01-04 PROBLEM — R79.89 ELEVATED TROPONIN: Status: RESOLVED | Noted: 2024-11-21 | Resolved: 2025-01-04

## 2025-01-06 ENCOUNTER — TELEPHONE (OUTPATIENT)
Dept: NEPHROLOGY | Age: 60
End: 2025-01-06

## 2025-01-06 ENCOUNTER — OFFICE VISIT (OUTPATIENT)
Dept: NEPHROLOGY | Age: 60
End: 2025-01-06
Payer: COMMERCIAL

## 2025-01-06 VITALS
OXYGEN SATURATION: 96 % | BODY MASS INDEX: 38.38 KG/M2 | DIASTOLIC BLOOD PRESSURE: 64 MMHG | WEIGHT: 190 LBS | HEART RATE: 93 BPM | SYSTOLIC BLOOD PRESSURE: 132 MMHG

## 2025-01-06 DIAGNOSIS — E11.21 DIABETIC NEPHROPATHY WITH PROTEINURIA (HCC): ICD-10-CM

## 2025-01-06 DIAGNOSIS — N18.1 CKD (CHRONIC KIDNEY DISEASE), STAGE I: Primary | ICD-10-CM

## 2025-01-06 DIAGNOSIS — I10 ESSENTIAL HYPERTENSION: ICD-10-CM

## 2025-01-06 LAB
ANION GAP SERPL CALC-SCNC: 15 MEQ/L (ref 8–16)
BUN SERPL-MCNC: 59 MG/DL (ref 7–22)
CALCIUM SERPL-MCNC: 10.1 MG/DL (ref 8.5–10.5)
CHLORIDE SERPL-SCNC: 99 MEQ/L (ref 98–111)
CO2 SERPL-SCNC: 20 MEQ/L (ref 23–33)
CREAT SERPL-MCNC: 2.2 MG/DL (ref 0.4–1.2)
GFR SERPL CREATININE-BSD FRML MDRD: 25 ML/MIN/1.73M2
GLUCOSE SERPL-MCNC: 105 MG/DL (ref 70–108)
POTASSIUM SERPL-SCNC: 5.9 MEQ/L (ref 3.5–5.2)
SODIUM SERPL-SCNC: 134 MEQ/L (ref 135–145)

## 2025-01-06 PROCEDURE — 99213 OFFICE O/P EST LOW 20 MIN: CPT | Performed by: INTERNAL MEDICINE

## 2025-01-06 PROCEDURE — 3078F DIAST BP <80 MM HG: CPT | Performed by: INTERNAL MEDICINE

## 2025-01-06 PROCEDURE — 3075F SYST BP GE 130 - 139MM HG: CPT | Performed by: INTERNAL MEDICINE

## 2025-01-06 NOTE — TELEPHONE ENCOUNTER
Patient called back and was informed. She voiced understanding. She will call us back to advise of what lab she will be going to so we can fax the orders there    Patient had labs done 1-14-25. Please advise

## 2025-01-06 NOTE — TELEPHONE ENCOUNTER
Bryant Nunez MD  [facility] Clinical Staff  Patient's creatinine is much worse and her potassium is much worse as well  Please prescribe the patient to take Lokelma 10 g daily for 3 days and low potassium diet  Hold the lisinopril and Aldactone drink slightly more liquids  Recheck another BUN/creatinine/potassium in 4 days    Lokelma sent to the pharmacy. Lab orders signed. MAR updated. Had to leave a message for patient to return my call

## 2025-01-06 NOTE — PROGRESS NOTES
[facility]        Outpatient Follow-Up note         1/6/2025 9:49 AM    Patient Name:   Priya Lema  YOB: 1965  Primary Care Physician:  Parmjit Tesfaye MD   [facility]  [address]  [address]  [place] [address]  Dept: [phone]  Loc: [phone]     Chief Complaint / Reason for follow-up : Follow Up of recent hospital discharge     Interval History :  Patient seen and examined by me.  I have seen the patient in the hospital in early December for some acute kidney injury and clearance for a cardiac cath patient did have cardiac cath and no significant CAD found.  Her creatinine at presentation was close to 3.0.     Past History :  Past Medical History:   Diagnosis Date    COPD (chronic obstructive pulmonary disease) (HCC)     Diabetes mellitus (HCC)     Hyperlipidemia     Hypertension     CARROLL (obstructive sleep apnea)      Past Surgical History:   Procedure Laterality Date    CARDIAC PROCEDURE N/A 12/5/2024    Left heart cath / coronary angiography performed by Nando Nolan MD at [facility] CARDIAC CATH LAB    CYSTOSCOPY Left 11/21/2024    CYSTOSCOPY LEFT URETERAL STENT INSERTION performed by Zurdo Richardson MD at [facility] OR    CYSTOSCOPY Left 12/30/2024    CYSTOSCOPY, LEFT URETEROSCOPY, HOLIUM LASER LITOTRIPSY, BASKET RETRIEVAL OF STONE FRAGMENETS, LEFT  STENT EXCHANGE performed by Esteban Wilson Jr., MD at [facility] OR        Medications :     Outpatient Medications Marked as Taking for the 1/6/25 encounter (Office Visit) with Bryant Nunez MD   Medication Sig Dispense Refill    oxyBUTYnin (DITROPAN XL) 5 MG extended release tablet Take 1 tablet by mouth daily for 14 days 14 tablet 0    hydrOXYzine HCl (ATARAX) 10 MG tablet Take 1 tablet by mouth every 8 hours as needed for Itching      albuterol sulfate HFA (PROVENTIL;VENTOLIN;PROAIR) 108 (90 Base) MCG/ACT inhaler Inhale 2 puffs

## 2025-01-06 NOTE — RESULT ENCOUNTER NOTE
Patient's creatinine is much worse and her potassium is much worse as well  Please prescribe the patient to take Lokelma 10 g daily for 3 days and low potassium diet  Hold the lisinopril and Aldactone drink slightly more liquids  Recheck another BUN/creatinine/potassium in 4 days

## 2025-01-14 LAB
BUN / CREAT RATIO: 21 (ref 7–25)
BUN BLDV-MCNC: 30 MG/DL (ref 3–29)
CALCIUM SERPL-MCNC: 9.8 MG/DL (ref 8.5–10.5)
CHLORIDE BLD-SCNC: 104 MEQ/L (ref 96–110)
CO2: 22 MEQ/L (ref 19–32)
CREAT SERPL-MCNC: 1.4 MG/DL (ref 0.5–1.2)
ESTIMATED GLOMERULAR FILTRATION RATE CREATININE EQUATION: 43 MLS/MIN/1.73M2
FASTING STATUS: ABNORMAL
GLUCOSE BLD-MCNC: 133 MG/DL (ref 70–99)
POTASSIUM SERPL-SCNC: 4.9 MEQ/L (ref 3.4–5.3)
SODIUM BLD-SCNC: 139 MEQ/L (ref 135–148)

## 2025-01-30 ENCOUNTER — OFFICE VISIT (OUTPATIENT)
Dept: UROLOGY | Age: 60
End: 2025-01-30
Payer: COMMERCIAL

## 2025-01-30 VITALS — BODY MASS INDEX: 38.3 KG/M2 | WEIGHT: 190 LBS | HEIGHT: 59 IN | RESPIRATION RATE: 20 BRPM

## 2025-01-30 DIAGNOSIS — N20.0 KIDNEY STONE: Primary | ICD-10-CM

## 2025-01-30 DIAGNOSIS — R82.90 ABNORMAL URINALYSIS: ICD-10-CM

## 2025-01-30 LAB
BACTERIA: ABNORMAL
BILIRUB UR QL STRIP: NEGATIVE
BILIRUBIN, URINE: NEGATIVE
BLOOD URINE, POC: ABNORMAL
CASTS #/AREA URNS LPF: ABNORMAL /LPF
CASTS #/AREA URNS LPF: ABNORMAL /LPF
CHARACTER UR: CLEAR
CHARACTER, URINE: CLEAR
CHARCOAL URNS QL MICRO: ABNORMAL
COLOR UR: YELLOW
COLOR, UA: YELLOW
CRYSTALS URNS QL MICRO: ABNORMAL
EPITHELIAL CELLS, UA: ABNORMAL /HPF
GLUCOSE UR QL STRIP.AUTO: NEGATIVE MG/DL
GLUCOSE URINE: NEGATIVE MG/DL
HGB UR QL STRIP.AUTO: NEGATIVE
KETONES UR QL STRIP.AUTO: NEGATIVE
KETONES, URINE: NEGATIVE
LEUKOCYTE CLUMPS, URINE: ABNORMAL
LEUKOCYTE ESTERASE UR QL STRIP.AUTO: ABNORMAL
NITRITE UR QL STRIP.AUTO: NEGATIVE
NITRITE, URINE: NEGATIVE
PH UR STRIP.AUTO: 6 [PH] (ref 5–9)
PH, URINE: 6 (ref 5–9)
PROT UR STRIP.AUTO-MCNC: NEGATIVE MG/DL
PROTEIN, URINE: NEGATIVE MG/DL
RBC #/AREA URNS HPF: ABNORMAL /HPF
RENAL EPI CELLS #/AREA URNS HPF: ABNORMAL /[HPF]
SPECIFIC GRAVITY UA: 1.01 (ref 1–1.03)
SPECIFIC GRAVITY UA: 1.01 (ref 1–1.03)
UROBILINOGEN, URINE: 0.2 EU/DL (ref 0–1)
UROBILINOGEN, URINE: 0.2 EU/DL (ref 0–1)
WBC #/AREA URNS HPF: ABNORMAL /HPF
YEAST LIKE FUNGI URNS QL MICRO: ABNORMAL

## 2025-01-30 PROCEDURE — 81003 URINALYSIS AUTO W/O SCOPE: CPT

## 2025-01-30 PROCEDURE — 99213 OFFICE O/P EST LOW 20 MIN: CPT

## 2025-01-30 NOTE — PATIENT INSTRUCTIONS
Get BMP (blood work)  KUB (X-ray)  L[facility]ink 24 hour urine collection to determine etiology of stone disease.   I will send your urine for additional studies and call with abnormal results.   Follow-up in 3 months with results  Call with questions, comments, or concerns. I recommend going to the ED for further evaluation if you develop fever, chills, nausea, vomiting, chest pain, SOB, or calf pain.    The medication list included in this document is our record of what you are currently taking, including any changes that were made at today's visit.  If you find any differences when compared to your medications at home, or have any questions that were not answered at your visit, please contact the office.

## 2025-01-30 NOTE — PROGRESS NOTES
[facility] [place] [facility]  [facility] UROLOGY  [address]  [address]  [facility] [address]  Dept: [phone]  Loc: [phone]    Visit Date: 1/30/2025        HPI:     HPI  Ms. Lema is a 59-year-old female that presents in follow-up.     Pt presents after being seen in the hospital for pyelonephritis, RAINE, and a L ureteral stone. CT imaging at an OSH appreciated a 1.1 cm at the renal pelvis. She therefore underwent a cystoscopy,  L ureteral stent placement with Dr. Zurdo Richardson on 11/21/2024. On 12/30/2024, she underwent definitive treatment via cystoscopy, L URS, HLL, basket retrieval of stones, and L stent exchange.  Reports that the ureteral stent was removed without difficulty.     Medical hx of PAF, HTN, type 2 DM, COPD, and tobacco abuse.     Current Outpatient Medications   Medication Sig Dispense Refill    sodium zirconium cyclosilicate (LOKELMA) 10 g PACK oral suspension Take 1 packet by mouth daily 30 packet 0    hydrOXYzine HCl (ATARAX) 10 MG tablet Take 1 tablet by mouth every 8 hours as needed for Itching      albuterol sulfate HFA (PROVENTIL;VENTOLIN;PROAIR) 108 (90 Base) MCG/ACT inhaler Inhale 2 puffs into the lungs as needed for Shortness of Breath 18 g 3    apixaban (ELIQUIS) 5 MG TABS tablet Take 1 tablet by mouth 2 times daily 60 tablet 0    budesonide (PULMICORT) 0.25 MG/2ML nebulizer suspension Take 2 mLs by nebulization in the morning and 2 mLs in the evening. 60 each 3    insulin glargine (LANTUS) 100 UNIT/ML injection vial Inject 25 Units into the skin daily 10 mL 3    insulin lispro (HUMALOG,ADMELOG) 100 UNIT/ML SOLN injection vial Inject 0-8 Units into the skin 4 times daily (before meals and nightly) 10 mL 0    lisinopril (PRINIVIL;ZESTRIL) 40 MG tablet Take 1 tablet by mouth daily 30 tablet 3    metoprolol succinate (TOPROL XL) 25 MG extended release tablet Take 1 tablet by mouth daily 30 tablet 3    oxyBUTYnin (DITROPAN XL) 5 MG extended release

## 2025-01-31 LAB
BUN / CREAT RATIO: 22 (ref 7–25)
BUN BLDV-MCNC: 26 MG/DL (ref 3–29)
CALCIUM SERPL-MCNC: 9.6 MG/DL (ref 8.5–10.5)
CHLORIDE BLD-SCNC: 100 MEQ/L (ref 96–110)
CO2: 21 MEQ/L (ref 19–32)
CREAT SERPL-MCNC: 1.2 MG/DL (ref 0.5–1.2)
ESTIMATED GLOMERULAR FILTRATION RATE CREATININE EQUATION: 52 MLS/MIN/1.73M2
FASTING STATUS: ABNORMAL
GLUCOSE BLD-MCNC: 99 MG/DL (ref 70–99)
POTASSIUM SERPL-SCNC: 5 MEQ/L (ref 3.4–5.3)
SODIUM BLD-SCNC: 138 MEQ/L (ref 135–148)

## 2025-02-01 LAB
BACTERIA UR CULT: ABNORMAL
ORGANISM: ABNORMAL

## 2025-02-03 ENCOUNTER — TELEPHONE (OUTPATIENT)
Dept: UROLOGY | Age: 60
End: 2025-02-03

## 2025-02-03 NOTE — TELEPHONE ENCOUNTER
BMP with stable findings. Follow-up with Nephrology as scheduled or sooner if needed    Urine negative for infection and microhematuria    The patient should go to the ED should they develop fever, chills, nausea, vomiting, chest pain, SOB, calf pain, feelings of incomplete emptying, or should they otherwise feel they need evaluated    Please have the patient follow-up as scheduled or sooner if needed

## 2025-02-14 LAB
BUN / CREAT RATIO: 20 (ref 7–25)
BUN BLDV-MCNC: 27 MG/DL (ref 3–29)
BUN BLDV-MCNC: 28 MG/DL (ref 3–29)
CALCIUM SERPL-MCNC: 9.7 MG/DL (ref 8.5–10.5)
CHLORIDE BLD-SCNC: 101 MEQ/L (ref 96–110)
CO2: 28 MEQ/L (ref 19–32)
CREAT SERPL-MCNC: 1.4 MG/DL (ref 0.5–1.2)
CREAT SERPL-MCNC: 1.4 MG/DL (ref 0.5–1.2)
ESTIMATED GLOMERULAR FILTRATION RATE CREATININE EQUATION: 43 MLS/MIN/1.73M2
FASTING STATUS: ABNORMAL
GLUCOSE BLD-MCNC: 97 MG/DL (ref 70–99)
POTASSIUM SERPL-SCNC: 4.8 MEQ/L (ref 3.4–5.3)
POTASSIUM SERPL-SCNC: 4.8 MEQ/L (ref 3.4–5.3)
SODIUM BLD-SCNC: 141 MEQ/L (ref 135–148)

## 2025-07-10 ENCOUNTER — TELEPHONE (OUTPATIENT)
Dept: NEPHROLOGY | Age: 60
End: 2025-07-10

## 2025-07-10 NOTE — TELEPHONE ENCOUNTER
This patient was last seen by Dr. Nunez for a hospital follow up in January 2025. She wanted to be seen in [place] so it was scheduled with Dr. Gilbert for tomorrow. Patient says she was not told she needed labs. She will go to [facility] today for labs. Faxed to [facility].

## 2025-07-11 ENCOUNTER — OFFICE VISIT (OUTPATIENT)
Dept: NEPHROLOGY | Age: 60
End: 2025-07-11
Payer: MEDICAID

## 2025-07-11 VITALS
BODY MASS INDEX: 37.09 KG/M2 | DIASTOLIC BLOOD PRESSURE: 86 MMHG | OXYGEN SATURATION: 95 % | SYSTOLIC BLOOD PRESSURE: 142 MMHG | WEIGHT: 184 LBS | HEIGHT: 59 IN | HEART RATE: 86 BPM

## 2025-07-11 DIAGNOSIS — E11.21 DIABETIC NEPHROPATHY ASSOCIATED WITH TYPE 2 DIABETES MELLITUS (HCC): ICD-10-CM

## 2025-07-11 DIAGNOSIS — E87.5 HYPERKALEMIA: ICD-10-CM

## 2025-07-11 DIAGNOSIS — N18.32 STAGE 3B CHRONIC KIDNEY DISEASE (HCC): Primary | ICD-10-CM

## 2025-07-11 DIAGNOSIS — R80.9 MICROALBUMINURIA: ICD-10-CM

## 2025-07-11 DIAGNOSIS — I10 PRIMARY HYPERTENSION: ICD-10-CM

## 2025-07-11 PROCEDURE — 99214 OFFICE O/P EST MOD 30 MIN: CPT | Performed by: INTERNAL MEDICINE

## 2025-07-11 PROCEDURE — 3077F SYST BP >= 140 MM HG: CPT | Performed by: INTERNAL MEDICINE

## 2025-07-11 PROCEDURE — 3079F DIAST BP 80-89 MM HG: CPT | Performed by: INTERNAL MEDICINE

## 2025-07-11 RX ORDER — TIOTROPIUM BROMIDE AND OLODATEROL 3.124; 2.736 UG/1; UG/1
SPRAY, METERED RESPIRATORY (INHALATION)
COMMUNITY

## 2025-07-11 RX ORDER — SULFAMETHOXAZOLE AND TRIMETHOPRIM 800; 160 MG/1; MG/1
TABLET ORAL
COMMUNITY
Start: 2024-12-27

## 2025-07-11 RX ORDER — INSULIN GLARGINE 100 [IU]/ML
INJECTION, SOLUTION SUBCUTANEOUS
COMMUNITY
Start: 2025-07-01

## 2025-07-11 RX ORDER — PHENAZOPYRIDINE HYDROCHLORIDE 100 MG/1
TABLET, FILM COATED ORAL
COMMUNITY
Start: 2024-12-30

## 2025-07-11 RX ORDER — PEN NEEDLE, DIABETIC 31 GX5/16"
NEEDLE, DISPOSABLE MISCELLANEOUS
COMMUNITY
Start: 2025-04-15

## 2025-07-11 NOTE — PROGRESS NOTES
CHIEF COMPLAINT:   Patient presents with:  URI: Cough/URI symptoms x2 weeks      HPI:   Yuly Cruz is a 38year old female who presents for upper respiratory symptoms for  2 weeks. Patient reports frequent, very dry cough.  It awakens her, she is [place] Priya was last seen by Dr. Nunez after a hospital follow up. Patient requests being seen in [place]. Denies any changes.   She is unsure of what medications she takes and did not bring a list.   • REMOVAL OF TONSILS,12+ Y/O           Social History    Tobacco Use      Smoking status: Current Every Day Smoker      Smokeless tobacco: Never Used    Alcohol use: No    Drug use: No        REVIEW OF SYSTEMS:   GENERAL: feels well otherwise, normal appet - Advised F/U visit with PCP if no improvement/worsening within 1 week; sooner if new fever or worsening breathing. To ER if ever dyspnea, chest pain, SOB.  - Pt verbalizes understanding and is agreeable w/ plan.     Meds & Refills for this Visit:  Request · You may use over-the-counter medicine to control fever or pain, unless another pain medicine was prescribed. If you have chronic liver or kidney disease or have ever had a stomach ulcer or gastrointestinal bleeding, talk with your healthcare provider bef © 8133-3543 [employer] [address]. [address] [facility], [facility] [place]. All rights reserved. This information is not intended as a substitute for professional medical care. Always follow your healthcare professional's instructions.

## 2025-07-11 NOTE — PROGRESS NOTES
Renal Progress Note    Assessment and Plan:      Diagnosis Orders   1. Stage 3b chronic kidney disease (HCC)  Basic Metabolic Panel      2. Diabetic nephropathy associated with type 2 diabetes mellitus (HCC)        3. Primary hypertension        4. Microalbuminuria        5. Hyperkalemia  Potassium                PLAN:  Serum creatinine remains stable  Diabetes mellitus is managed by another provider  3.  Hypertension is stable   4.  Microalbumin creatinine ratio is pending  5  .Serum potassium is high and will start sodium zirconium cyclosilicate 5 gm daily for 3 days with repeat level in 5 days   6.Return visit in 6 months with labs             Patient Active Problem List   Diagnosis    Pyelonephritis    Sepsis (HCC)    Acute on chronic respiratory failure with hypoxia and hypercapnia (HCC)    RAINE (acute kidney injury)    Metabolic acidosis    Type 2 diabetes mellitus with hyperglycemia (HCC)    COPD exacerbation (HCC)    Septic shock (HCC)    Acute HFrEF (heart failure with reduced ejection fraction) (HCC)    PAF (paroxysmal atrial fibrillation) (HCC)    Ureteral calculus, left    Gross hematuria    Diabetic nephropathy with proteinuria (HCC)    Essential hypertension    S/P ureteral stent placement    Hematuria    Acute respiratory failure (HCC)    Pneumonia due to infectious organism    History of COPD    Dilated cardiomyopathy (HCC)    Tobacco abuse    Anemia    Kidney stone           Subjective:   Chief complaint:  Chief Complaint   Patient presents with    Follow-up     6 month FU for CKD      HPI:This is a follow up visit for Ms Priya Lema here today for a follow up appointment .Sees her for chronic kidney disease and was last seen in January 2025 by Dr Rodriguez.Doing well with no complaint .Appetite is good and urinates well.Has chronic shortness of breath and on chronic oxygen    ROS:  Pertinent positives stated above in HPI. All other systems were reviewed and were negative.  Medications:     Current

## (undated) DEVICE — CYSTO: Brand: MEDLINE INDUSTRIES, INC.

## (undated) DEVICE — CATHETER ANGIO 5FR L100CM GRY S STL NYL JR4 3 SEG BRAID L

## (undated) DEVICE — SOLUTION IRRIG 3000ML 0.9% SOD CHL USP UROMATIC PLAS CONT

## (undated) DEVICE — GUIDEWIRE URO L150CM DIA .035IN STIFF NIT HYDRPHL STR TIP

## (undated) DEVICE — CATHETER,FOLEY,SILI-ELAST,LTX,18FR,10ML: Brand: MEDLINE

## (undated) DEVICE — CATHETER ANGIO 3DRC 0.045 INX5 FRX100 CM THRULUMEN INFINITI

## (undated) DEVICE — DRAPE KIT RAMAPR RADIATION SHIELD

## (undated) DEVICE — CATHETER 5FR JL3.5 CORDIS 100CM

## (undated) DEVICE — SINGLE ACTION PUMPING SYSTEM

## (undated) DEVICE — BAND COMPR L24CM REG CLR PLAS HEMSTAT EXT HK AND LOOP RETEN

## (undated) DEVICE — SOLUTION IRRIG 1000ML STRL H2O USP PLAS POUR BTL

## (undated) DEVICE — CONNECTOR,SIMS,STERILE: Brand: MEDLINE INDUSTRIES, INC.

## (undated) DEVICE — DUAL LUMEN URETERAL CATHETER

## (undated) DEVICE — SINGLE-USE DIGITAL FLEXIBLE URETEROSCOPE: Brand: LITHOVUE

## (undated) DEVICE — Device

## (undated) DEVICE — ADAPTER URO SCP UROLOK LL

## (undated) DEVICE — FIBER LASER HOLM DISP SU200RT] LEONI FIBER OPTICS INC]

## (undated) DEVICE — GLIDESHEATH SLENDER ACCESS KIT: Brand: GLIDESHEATH SLENDER

## (undated) DEVICE — OFF - ST. RITAS VASC: Brand: MEDLINE INDUSTRIES, INC.

## (undated) DEVICE — GUIDEWIRE VASC L260CM DIA0.035IN RAD 3MM J TIP L7CM PTFE